# Patient Record
Sex: FEMALE | Race: WHITE | NOT HISPANIC OR LATINO | Employment: UNEMPLOYED | URBAN - NONMETROPOLITAN AREA
[De-identification: names, ages, dates, MRNs, and addresses within clinical notes are randomized per-mention and may not be internally consistent; named-entity substitution may affect disease eponyms.]

---

## 2017-03-01 ENCOUNTER — COMMUNICATION - GICH (OUTPATIENT)
Dept: FAMILY MEDICINE | Facility: OTHER | Age: 72
End: 2017-03-01

## 2017-03-01 DIAGNOSIS — J44.1 CHRONIC OBSTRUCTIVE PULMONARY DISEASE WITH ACUTE EXACERBATION (H): ICD-10-CM

## 2017-03-28 ENCOUNTER — COMMUNICATION - GICH (OUTPATIENT)
Dept: FAMILY MEDICINE | Facility: OTHER | Age: 72
End: 2017-03-28

## 2017-03-28 DIAGNOSIS — J44.1 CHRONIC OBSTRUCTIVE PULMONARY DISEASE WITH ACUTE EXACERBATION (H): ICD-10-CM

## 2017-04-04 ENCOUNTER — COMMUNICATION - GICH (OUTPATIENT)
Dept: FAMILY MEDICINE | Facility: OTHER | Age: 72
End: 2017-04-04

## 2017-04-04 DIAGNOSIS — J43.8 OTHER EMPHYSEMA (H): ICD-10-CM

## 2017-04-24 ENCOUNTER — COMMUNICATION - GICH (OUTPATIENT)
Dept: FAMILY MEDICINE | Facility: OTHER | Age: 72
End: 2017-04-24

## 2017-04-24 DIAGNOSIS — J44.1 CHRONIC OBSTRUCTIVE PULMONARY DISEASE WITH ACUTE EXACERBATION (H): ICD-10-CM

## 2017-05-09 ENCOUNTER — AMBULATORY - GICH (OUTPATIENT)
Dept: SCHEDULING | Facility: OTHER | Age: 72
End: 2017-05-09

## 2017-05-09 ENCOUNTER — HISTORY (OUTPATIENT)
Dept: EMERGENCY MEDICINE | Facility: OTHER | Age: 72
End: 2017-05-09

## 2017-05-12 ENCOUNTER — AMBULATORY - GICH (OUTPATIENT)
Dept: SCHEDULING | Facility: OTHER | Age: 72
End: 2017-05-12

## 2017-05-15 ENCOUNTER — AMBULATORY - GICH (OUTPATIENT)
Dept: SCHEDULING | Facility: OTHER | Age: 72
End: 2017-05-15

## 2018-01-03 NOTE — TELEPHONE ENCOUNTER
Patient Information     Patient Name MRN Sex Jaida Contreras [id number] Female 1945      Telephone Encounter by Macarena Phillips RN at 3/1/2017 10:47 AM     Author:  Macarena Phillips RN Service:  (none) Author Type:  NURS- Registered Nurse     Filed:  3/1/2017 10:48 AM Encounter Date:  3/1/2017 Status:  Signed     :  Macarena Phillips RN (NURS- Registered Nurse)            Asthma/COPD    Office visit in the past 12 months.    Last visit with BISMARK CEDENO was on: 2016 in [facility]  Next visit with BISMARK CEDENO is on: No future appointment listed with this provider  Next visit with Family Practice is on: No future appointment listed in this department    Max refills 12 months from last office visit.    Patient is due for medication management appointment. Limited refill provided at this time and letter recently sent for reminder to patient. Prescription refilled per RN Medication Refill Policy.................... Macarena Phillips RN ....................  3/1/2017   10:48 AM

## 2018-01-03 NOTE — TELEPHONE ENCOUNTER
Patient Information     Patient Name MRN Sex Jaida Contreras [id number] Female 1945      Telephone Encounter by So Huang RN at 3/1/2017 10:21 AM     Author:  So Huang RN Service:  (none) Author Type:  (none)     Filed:  3/1/2017 10:22 AM Encounter Date:  3/1/2017 Status:  Signed     :  So Huang RN (NURS- Registered Nurse)            Asthma/COPD    Office visit in the past 12 months.    Last visit with BISMARK CEDENO was on: 2016 in [facility]  Next visit with BISMARK CEDENO is on: No future appointment listed with this provider  Next visit with Family Practice is on: No future appointment listed in this department    Max refills 12 months from last office visit.    Patient is due for medication management appointment. Limited refill provided at this time and letter sent for reminder to patient. Prescription refilled per RN Medication Refill Policy.................... So Huang ....................  3/1/2017   10:22 AM

## 2018-01-04 NOTE — TELEPHONE ENCOUNTER
Patient Information     Patient Name MRN Sex Jaida Contreras [id number] Female 1945      Telephone Encounter by So Huang RN at 3/28/2017  3:37 PM     Author:  So Huang RN Service:  (none) Author Type:  (none)     Filed:  3/28/2017  3:44 PM Encounter Date:  3/28/2017 Status:  Signed     :  So Huang RN (NURS- Registered Nurse)            Asthma/COPD    Office visit in the past 12 months.    Last visit with BISMARK CEDENO was on: 2016 in [facility]  Next visit with BISMARK CEDENO is on: No future appointment listed with this provider  Next visit with Family Practice is on: No future appointment listed in this department    Max refills 12 months from last office visit.    Patient is due for medication management appointment. Limited refill provided at this time and letter sent for reminder to patient. Prescription refilled per RN Medication Refill Policy.................... So Huang ....................  3/28/2017   3:42 PM

## 2018-01-04 NOTE — TELEPHONE ENCOUNTER
Patient Information     Patient Name MRN Jaida Raymond [id number] Female 1945      Telephone Encounter by So Huang RN at 2017  3:17 PM     Author:  So Huang RN Service:  (none) Author Type:  (none)     Filed:  2017  3:21 PM Encounter Date:  2017 Status:  Signed     :  So Huang RN (NURS- Registered Nurse)            Patient is looking for a refill of her symbicort. Pharmacy reports that patient was prescribed this medication while at [facility] by Dr Scott on 2016. This medication is not currently on patient's medication list.    Oral Steroid Inhalers    Office visit in the past 12 months.    Last visit with ROM ESCOTO was on: 2016 in [facility]  Next visit with ROM ESCOTO is on: No future appointment listed with this provider  Next visit with Family Practice is on: No future appointment listed in this department    Max refills 12 months from last office visit.    Patient is due to be seen by Rom Escoto MD and has been sent letters without a response. Attempted to reach patient by phone without a response.    Unable to complete prescription refill per RN Medication Refill Policy.................... So Huang ....................  2017   3:18 PM

## 2018-01-04 NOTE — TELEPHONE ENCOUNTER
Patient Information     Patient Name MRN Sex Jaida Contreras [id number] Female 1945      Telephone Encounter by So Huang RN at 2017  3:06 PM     Author:  So Huang RN Service:  (none) Author Type:  (none)     Filed:  2017  3:10 PM Encounter Date:  2017 Status:  Signed     :  So Huang RN (NURS- Registered Nurse)            Asthma/COPD    Office visit in the past 12 months.    Last visit with BISMARK CEDENO was on: 2016 in [facility]  Next visit with BISMARK CEDENO is on: No future appointment listed with this provider  Next visit with Family Practice is on: No future appointment listed in this department    Max refills 12 months from last office visit.    Patient has been sent 3 letters to be seen. Attempted to call patient, patient not home. Will call back later. No refills until seen.  So Huang RN............. 2017 3:08 PM

## 2018-01-04 NOTE — TELEPHONE ENCOUNTER
Patient Information     Patient Name MRN Jaida Raymond [id number] Female 1945      Telephone Encounter by So Huang RN at 2017  1:45 PM     Author:  So Huang RN Service:  (none) Author Type:  (none)     Filed:  2017  1:46 PM Encounter Date:  2017 Status:  Signed     :  So Huang RN (NURS- Registered Nurse)            Spoke with patient, states she no longer sees Dr Escoto and is a patient now of Dr Calderon. Pharmacy notified and chart updated.  So Huang RN............. 2017 1:45 PM

## 2018-01-18 RX ORDER — METOPROLOL SUCCINATE 25 MG/1
25 TABLET, EXTENDED RELEASE ORAL DAILY
COMMUNITY
Start: 2013-08-02

## 2018-01-18 RX ORDER — ATORVASTATIN CALCIUM 20 MG/1
20 TABLET, FILM COATED ORAL AT BEDTIME
COMMUNITY
Start: 2017-02-08

## 2018-01-18 RX ORDER — HYDROXYZINE HYDROCHLORIDE 25 MG/1
25 TABLET, FILM COATED ORAL EVERY 6 HOURS PRN
Status: ON HOLD | COMMUNITY
Start: 2016-01-11 | End: 2018-03-09

## 2018-01-18 RX ORDER — NABUMETONE 500 MG/1
500 TABLET, FILM COATED ORAL 2 TIMES DAILY
Status: ON HOLD | COMMUNITY
Start: 2016-01-08 | End: 2018-03-09

## 2018-01-18 RX ORDER — BUDESONIDE AND FORMOTEROL FUMARATE DIHYDRATE 160; 4.5 UG/1; UG/1
2 AEROSOL RESPIRATORY (INHALATION) DAILY
Status: ON HOLD | COMMUNITY
Start: 2017-04-04 | End: 2021-10-27

## 2018-01-18 RX ORDER — VENLAFAXINE HYDROCHLORIDE 150 MG/1
150 CAPSULE, EXTENDED RELEASE ORAL DAILY
COMMUNITY

## 2018-01-18 RX ORDER — ASPIRIN 81 MG/1
81 TABLET ORAL DAILY
COMMUNITY
Start: 2015-09-17

## 2018-01-18 RX ORDER — CLONAZEPAM 1 MG/1
1 TABLET ORAL 2 TIMES DAILY PRN
Status: ON HOLD | COMMUNITY
Start: 2017-04-10 | End: 2021-12-05

## 2018-01-18 RX ORDER — SIMVASTATIN 20 MG
10 TABLET ORAL AT BEDTIME
Status: ON HOLD | COMMUNITY
End: 2018-03-09

## 2018-01-18 RX ORDER — LISINOPRIL 5 MG/1
TABLET ORAL
Status: ON HOLD | COMMUNITY
End: 2021-10-27

## 2018-01-18 RX ORDER — ALBUTEROL SULFATE 0.83 MG/ML
1 SOLUTION RESPIRATORY (INHALATION) EVERY 4 HOURS PRN
COMMUNITY
Start: 2015-09-30

## 2018-01-18 RX ORDER — VENLAFAXINE HYDROCHLORIDE 75 MG/1
75 CAPSULE, EXTENDED RELEASE ORAL DAILY
COMMUNITY

## 2018-01-18 RX ORDER — ALBUTEROL SULFATE 90 UG/1
1-2 AEROSOL, METERED RESPIRATORY (INHALATION) EVERY 4 HOURS PRN
COMMUNITY
Start: 2017-02-23

## 2018-03-08 ENCOUNTER — APPOINTMENT (OUTPATIENT)
Dept: GENERAL RADIOLOGY | Facility: OTHER | Age: 73
DRG: 194 | End: 2018-03-08
Attending: INTERNAL MEDICINE
Payer: COMMERCIAL

## 2018-03-08 ENCOUNTER — HOSPITAL ENCOUNTER (INPATIENT)
Facility: OTHER | Age: 73
LOS: 4 days | Discharge: HOME | DRG: 194 | End: 2018-03-12
Attending: FAMILY MEDICINE | Admitting: INTERNAL MEDICINE
Payer: COMMERCIAL

## 2018-03-08 ENCOUNTER — APPOINTMENT (OUTPATIENT)
Dept: CT IMAGING | Facility: OTHER | Age: 73
DRG: 194 | End: 2018-03-08
Attending: FAMILY MEDICINE
Payer: COMMERCIAL

## 2018-03-08 DIAGNOSIS — N39.0 E. COLI UTI (URINARY TRACT INFECTION): Primary | ICD-10-CM

## 2018-03-08 DIAGNOSIS — F17.210 CIGARETTE SMOKER: ICD-10-CM

## 2018-03-08 DIAGNOSIS — J18.9 PNEUMONIA OF RIGHT LUNG DUE TO INFECTIOUS ORGANISM, UNSPECIFIED PART OF LUNG: ICD-10-CM

## 2018-03-08 DIAGNOSIS — Z79.82 ENCOUNTER FOR LONG-TERM (CURRENT) USE OF ASPIRIN: ICD-10-CM

## 2018-03-08 DIAGNOSIS — B96.20 E. COLI UTI (URINARY TRACT INFECTION): Primary | ICD-10-CM
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
## 2018-03-08 DIAGNOSIS — S32.16XA: ICD-10-CM
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
## 2018-03-08 DIAGNOSIS — S32.130A: ICD-10-CM
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
## 2018-03-08 DIAGNOSIS — I10 HYPERTENSION, UNSPECIFIED TYPE: ICD-10-CM

## 2018-03-08 DIAGNOSIS — N30.90 CYSTITIS: ICD-10-CM

## 2018-03-08 PROBLEM — S32.10XA CLOSED FRACTURE OF SACRUM (H): Status: ACTIVE | Noted: 2018-03-08

## 2018-03-08 LAB
ALBUMIN SERPL-MCNC: 3.6 G/DL (ref 3.5–5.7)
ALBUMIN UR-MCNC: >299 MG/DL
ALP SERPL-CCNC: 86 U/L (ref 34–104)
ALT SERPL W P-5'-P-CCNC: 10 U/L (ref 7–52)
ANION GAP SERPL CALCULATED.3IONS-SCNC: 8 MMOL/L (ref 3–14)
APPEARANCE UR: CLEAR
AST SERPL W P-5'-P-CCNC: 12 U/L (ref 13–39)
BACTERIA #/AREA URNS HPF: ABNORMAL /HPF
BASOPHILS # BLD AUTO: 0.1 10E9/L (ref 0–0.2)
BASOPHILS NFR BLD AUTO: 0.6 %
BILIRUB SERPL-MCNC: 0.5 MG/DL (ref 0.3–1)
BILIRUB UR QL STRIP: ABNORMAL
BUN SERPL-MCNC: 18 MG/DL (ref 7–25)
CALCIUM SERPL-MCNC: 9.7 MG/DL (ref 8.6–10.3)
CHLORIDE SERPL-SCNC: 100 MMOL/L (ref 98–107)
CO2 SERPL-SCNC: 30 MMOL/L (ref 21–31)
COLOR UR AUTO: ABNORMAL
CREAT SERPL-MCNC: 0.74 MG/DL (ref 0.6–1.2)
DIFFERENTIAL METHOD BLD: ABNORMAL
EOSINOPHIL # BLD AUTO: 0 10E9/L (ref 0–0.7)
EOSINOPHIL NFR BLD AUTO: 0.2 %
ERYTHROCYTE [DISTWIDTH] IN BLOOD BY AUTOMATED COUNT: 13.2 % (ref 10–15)
FLUAV+FLUBV RNA SPEC QL NAA+PROBE: NEGATIVE
FLUAV+FLUBV RNA SPEC QL NAA+PROBE: NEGATIVE
GFR SERPL CREATININE-BSD FRML MDRD: 78 ML/MIN/1.7M2
GLUCOSE SERPL-MCNC: 129 MG/DL (ref 70–105)
GLUCOSE UR STRIP-MCNC: NEGATIVE MG/DL
HCT VFR BLD AUTO: 35.2 % (ref 35–47)
HGB BLD-MCNC: 11.4 G/DL (ref 11.7–15.7)
HGB UR QL STRIP: ABNORMAL
IMM GRANULOCYTES # BLD: 0 10E9/L (ref 0–0.4)
IMM GRANULOCYTES NFR BLD: 0.3 %
KETONES UR STRIP-MCNC: NEGATIVE MG/DL
LEUKOCYTE ESTERASE UR QL STRIP: ABNORMAL
LIPASE SERPL-CCNC: 8 U/L (ref 11–82)
LYMPHOCYTES # BLD AUTO: 1.5 10E9/L (ref 0.8–5.3)
LYMPHOCYTES NFR BLD AUTO: 12.8 %
MCH RBC QN AUTO: 29 PG (ref 26.5–33)
MCHC RBC AUTO-ENTMCNC: 32.4 G/DL (ref 31.5–36.5)
MCV RBC AUTO: 90 FL (ref 78–100)
MONOCYTES # BLD AUTO: 1 10E9/L (ref 0–1.3)
MONOCYTES NFR BLD AUTO: 8.5 %
NEUTROPHILS # BLD AUTO: 9 10E9/L (ref 1.6–8.3)
NEUTROPHILS NFR BLD AUTO: 77.6 %
NITRATE UR QL: POSITIVE
NON-SQ EPI CELLS #/AREA URNS LPF: ABNORMAL /LPF
PH UR STRIP: 6.5 PH (ref 5–7)
PLATELET # BLD AUTO: 341 10E9/L (ref 150–450)
POTASSIUM SERPL-SCNC: 4.2 MMOL/L (ref 3.5–5.1)
PROT SERPL-MCNC: 7.2 G/DL (ref 6.4–8.9)
RBC # BLD AUTO: 3.93 10E12/L (ref 3.8–5.2)
RBC #/AREA URNS AUTO: ABNORMAL /HPF
RSV RNA SPEC NAA+PROBE: NEGATIVE
SODIUM SERPL-SCNC: 138 MMOL/L (ref 134–144)
SOURCE: ABNORMAL
SP GR UR STRIP: 1.02 (ref 1–1.03)
SPECIMEN SOURCE: NORMAL
UROBILINOGEN UR STRIP-ACNC: 1 EU/DL (ref 0.2–1)
WBC # BLD AUTO: 11.7 10E9/L (ref 4–11)
WBC #/AREA URNS AUTO: ABNORMAL /HPF

## 2018-03-08 PROCEDURE — 71046 X-RAY EXAM CHEST 2 VIEWS: CPT

## 2018-03-08 PROCEDURE — 25000125 ZZHC RX 250: Performed by: FAMILY MEDICINE

## 2018-03-08 PROCEDURE — 99223 1ST HOSP IP/OBS HIGH 75: CPT | Mod: AI | Performed by: INTERNAL MEDICINE

## 2018-03-08 PROCEDURE — 85025 COMPLETE CBC W/AUTO DIFF WBC: CPT | Performed by: FAMILY MEDICINE

## 2018-03-08 PROCEDURE — 87631 RESP VIRUS 3-5 TARGETS: CPT | Performed by: FAMILY MEDICINE

## 2018-03-08 PROCEDURE — 25000132 ZZH RX MED GY IP 250 OP 250 PS 637: Performed by: INTERNAL MEDICINE

## 2018-03-08 PROCEDURE — 36415 COLL VENOUS BLD VENIPUNCTURE: CPT | Performed by: FAMILY MEDICINE

## 2018-03-08 PROCEDURE — 96365 THER/PROPH/DIAG IV INF INIT: CPT | Performed by: FAMILY MEDICINE

## 2018-03-08 PROCEDURE — 12000000 ZZH R&B MED SURG/OB

## 2018-03-08 PROCEDURE — 87205 SMEAR GRAM STAIN: CPT | Performed by: INTERNAL MEDICINE

## 2018-03-08 PROCEDURE — 25000128 H RX IP 250 OP 636: Performed by: FAMILY MEDICINE

## 2018-03-08 PROCEDURE — 74177 CT ABD & PELVIS W/CONTRAST: CPT

## 2018-03-08 PROCEDURE — 99285 EMERGENCY DEPT VISIT HI MDM: CPT | Mod: 25 | Performed by: FAMILY MEDICINE

## 2018-03-08 PROCEDURE — 94640 AIRWAY INHALATION TREATMENT: CPT

## 2018-03-08 PROCEDURE — 96375 TX/PRO/DX INJ NEW DRUG ADDON: CPT | Performed by: FAMILY MEDICINE

## 2018-03-08 PROCEDURE — 40000275 ZZH STATISTIC RCP TIME EA 10 MIN

## 2018-03-08 PROCEDURE — 80053 COMPREHEN METABOLIC PANEL: CPT | Performed by: FAMILY MEDICINE

## 2018-03-08 PROCEDURE — 81001 URINALYSIS AUTO W/SCOPE: CPT | Performed by: FAMILY MEDICINE

## 2018-03-08 PROCEDURE — 87088 URINE BACTERIA CULTURE: CPT | Performed by: INTERNAL MEDICINE

## 2018-03-08 PROCEDURE — 87070 CULTURE OTHR SPECIMN AEROBIC: CPT | Performed by: INTERNAL MEDICINE

## 2018-03-08 PROCEDURE — 96376 TX/PRO/DX INJ SAME DRUG ADON: CPT | Performed by: FAMILY MEDICINE

## 2018-03-08 PROCEDURE — 25000128 H RX IP 250 OP 636: Performed by: INTERNAL MEDICINE

## 2018-03-08 PROCEDURE — 87086 URINE CULTURE/COLONY COUNT: CPT | Performed by: INTERNAL MEDICINE

## 2018-03-08 PROCEDURE — 83690 ASSAY OF LIPASE: CPT | Performed by: FAMILY MEDICINE

## 2018-03-08 PROCEDURE — 99285 EMERGENCY DEPT VISIT HI MDM: CPT | Mod: Z6 | Performed by: FAMILY MEDICINE

## 2018-03-08 RX ORDER — ALBUTEROL SULFATE 0.83 MG/ML
2.5 SOLUTION RESPIRATORY (INHALATION)
Status: DISCONTINUED | OUTPATIENT
Start: 2018-03-08 | End: 2018-03-08 | Stop reason: ALTCHOICE

## 2018-03-08 RX ORDER — ASPIRIN 81 MG/1
81 TABLET ORAL DAILY
Status: DISCONTINUED | OUTPATIENT
Start: 2018-03-09 | End: 2018-03-12 | Stop reason: HOSPADM

## 2018-03-08 RX ORDER — POTASSIUM CHLORIDE 1500 MG/1
20-40 TABLET, EXTENDED RELEASE ORAL
Status: DISCONTINUED | OUTPATIENT
Start: 2018-03-08 | End: 2018-03-10

## 2018-03-08 RX ORDER — AMOXICILLIN 250 MG
1 CAPSULE ORAL 2 TIMES DAILY PRN
Status: DISCONTINUED | OUTPATIENT
Start: 2018-03-08 | End: 2018-03-11

## 2018-03-08 RX ORDER — AMOXICILLIN 250 MG
2 CAPSULE ORAL 2 TIMES DAILY PRN
Status: DISCONTINUED | OUTPATIENT
Start: 2018-03-08 | End: 2018-03-11

## 2018-03-08 RX ORDER — OXYCODONE HYDROCHLORIDE 5 MG/1
5 TABLET ORAL EVERY 4 HOURS PRN
Status: DISCONTINUED | OUTPATIENT
Start: 2018-03-08 | End: 2018-03-12 | Stop reason: HOSPADM

## 2018-03-08 RX ORDER — MAGNESIUM SULFATE HEPTAHYDRATE 40 MG/ML
4 INJECTION, SOLUTION INTRAVENOUS EVERY 4 HOURS PRN
Status: DISCONTINUED | OUTPATIENT
Start: 2018-03-08 | End: 2018-03-11

## 2018-03-08 RX ORDER — METOPROLOL SUCCINATE 25 MG/1
25 TABLET, EXTENDED RELEASE ORAL DAILY
Status: DISCONTINUED | OUTPATIENT
Start: 2018-03-09 | End: 2018-03-12 | Stop reason: HOSPADM

## 2018-03-08 RX ORDER — BISACODYL 10 MG
10 SUPPOSITORY, RECTAL RECTAL DAILY PRN
Status: DISCONTINUED | OUTPATIENT
Start: 2018-03-08 | End: 2018-03-09

## 2018-03-08 RX ORDER — FENTANYL CITRATE 50 UG/ML
25 INJECTION, SOLUTION INTRAMUSCULAR; INTRAVENOUS
Status: DISCONTINUED | OUTPATIENT
Start: 2018-03-08 | End: 2018-03-08 | Stop reason: ALTCHOICE

## 2018-03-08 RX ORDER — VENLAFAXINE HYDROCHLORIDE 75 MG/1
150 CAPSULE, EXTENDED RELEASE ORAL
Status: DISCONTINUED | OUTPATIENT
Start: 2018-03-09 | End: 2018-03-12 | Stop reason: HOSPADM

## 2018-03-08 RX ORDER — PROCHLORPERAZINE 25 MG
12.5 SUPPOSITORY, RECTAL RECTAL EVERY 12 HOURS PRN
Status: DISCONTINUED | OUTPATIENT
Start: 2018-03-08 | End: 2018-03-09

## 2018-03-08 RX ORDER — PROCHLORPERAZINE MALEATE 5 MG
5 TABLET ORAL EVERY 6 HOURS PRN
Status: DISCONTINUED | OUTPATIENT
Start: 2018-03-08 | End: 2018-03-09

## 2018-03-08 RX ORDER — CEFTRIAXONE SODIUM 1 G/50ML
1 INJECTION, SOLUTION INTRAVENOUS ONCE
Status: COMPLETED | OUTPATIENT
Start: 2018-03-08 | End: 2018-03-08

## 2018-03-08 RX ORDER — NALOXONE HYDROCHLORIDE 0.4 MG/ML
.1-.4 INJECTION, SOLUTION INTRAMUSCULAR; INTRAVENOUS; SUBCUTANEOUS
Status: DISCONTINUED | OUTPATIENT
Start: 2018-03-08 | End: 2018-03-12 | Stop reason: HOSPADM

## 2018-03-08 RX ORDER — ACETAMINOPHEN 650 MG/1
650 SUPPOSITORY RECTAL EVERY 4 HOURS PRN
Status: DISCONTINUED | OUTPATIENT
Start: 2018-03-08 | End: 2018-03-09

## 2018-03-08 RX ORDER — SODIUM CHLORIDE 9 MG/ML
INJECTION, SOLUTION INTRAVENOUS CONTINUOUS
Status: DISCONTINUED | OUTPATIENT
Start: 2018-03-08 | End: 2018-03-09 | Stop reason: CLARIF

## 2018-03-08 RX ORDER — ONDANSETRON 2 MG/ML
4 INJECTION INTRAMUSCULAR; INTRAVENOUS EVERY 6 HOURS PRN
Status: DISCONTINUED | OUTPATIENT
Start: 2018-03-08 | End: 2018-03-09

## 2018-03-08 RX ORDER — PANTOPRAZOLE SODIUM 40 MG/1
40 TABLET, DELAYED RELEASE ORAL
Status: DISCONTINUED | OUTPATIENT
Start: 2018-03-09 | End: 2018-03-12 | Stop reason: HOSPADM

## 2018-03-08 RX ORDER — LEVOFLOXACIN 750 MG/1
750 TABLET, FILM COATED ORAL DAILY
Status: DISCONTINUED | OUTPATIENT
Start: 2018-03-08 | End: 2018-03-12 | Stop reason: HOSPADM

## 2018-03-08 RX ORDER — ALUMINA, MAGNESIA, AND SIMETHICONE 2400; 2400; 240 MG/30ML; MG/30ML; MG/30ML
30 SUSPENSION ORAL EVERY 4 HOURS PRN
Status: DISCONTINUED | OUTPATIENT
Start: 2018-03-08 | End: 2018-03-12 | Stop reason: HOSPADM

## 2018-03-08 RX ORDER — ACETAMINOPHEN 325 MG/1
650 TABLET ORAL EVERY 4 HOURS PRN
Status: DISCONTINUED | OUTPATIENT
Start: 2018-03-08 | End: 2018-03-09

## 2018-03-08 RX ORDER — KETOROLAC TROMETHAMINE 15 MG/ML
15 INJECTION, SOLUTION INTRAMUSCULAR; INTRAVENOUS EVERY 6 HOURS PRN
Status: DISCONTINUED | OUTPATIENT
Start: 2018-03-08 | End: 2018-03-12 | Stop reason: HOSPADM

## 2018-03-08 RX ORDER — NALOXONE HYDROCHLORIDE 0.4 MG/ML
.1-.4 INJECTION, SOLUTION INTRAMUSCULAR; INTRAVENOUS; SUBCUTANEOUS
Status: DISCONTINUED | OUTPATIENT
Start: 2018-03-08 | End: 2018-03-08

## 2018-03-08 RX ORDER — LIDOCAINE 40 MG/G
CREAM TOPICAL
Status: DISCONTINUED | OUTPATIENT
Start: 2018-03-08 | End: 2018-03-12 | Stop reason: HOSPADM

## 2018-03-08 RX ORDER — POTASSIUM CHLORIDE 7.45 MG/ML
10 INJECTION INTRAVENOUS
Status: DISCONTINUED | OUTPATIENT
Start: 2018-03-08 | End: 2018-03-10

## 2018-03-08 RX ORDER — ONDANSETRON 4 MG/1
4 TABLET, ORALLY DISINTEGRATING ORAL EVERY 6 HOURS PRN
Status: DISCONTINUED | OUTPATIENT
Start: 2018-03-08 | End: 2018-03-09

## 2018-03-08 RX ORDER — LISINOPRIL 2.5 MG/1
2.5 TABLET ORAL AT BEDTIME
Status: DISCONTINUED | OUTPATIENT
Start: 2018-03-08 | End: 2018-03-12 | Stop reason: HOSPADM

## 2018-03-08 RX ORDER — ATORVASTATIN CALCIUM 20 MG/1
20 TABLET, FILM COATED ORAL DAILY
Status: DISCONTINUED | OUTPATIENT
Start: 2018-03-09 | End: 2018-03-12 | Stop reason: HOSPADM

## 2018-03-08 RX ADMIN — IOHEXOL 100 ML: 350 INJECTION, SOLUTION INTRAVENOUS at 11:56

## 2018-03-08 RX ADMIN — FLUTICASONE PROPIONATE AND SALMETEROL 1 PUFF: 50; 250 POWDER RESPIRATORY (INHALATION) at 19:09

## 2018-03-08 RX ADMIN — FENTANYL CITRATE 25 MCG: 50 INJECTION, SOLUTION INTRAMUSCULAR; INTRAVENOUS at 11:20

## 2018-03-08 RX ADMIN — SODIUM CHLORIDE: 900 INJECTION, SOLUTION INTRAVENOUS at 16:51

## 2018-03-08 RX ADMIN — ACETAMINOPHEN 650 MG: 325 TABLET, FILM COATED ORAL at 22:09

## 2018-03-08 RX ADMIN — ACETAMINOPHEN 650 MG: 325 TABLET, FILM COATED ORAL at 18:09

## 2018-03-08 RX ADMIN — OXYCODONE HYDROCHLORIDE 5 MG: 5 TABLET ORAL at 19:47

## 2018-03-08 RX ADMIN — FENTANYL CITRATE 25 MCG: 50 INJECTION, SOLUTION INTRAMUSCULAR; INTRAVENOUS at 12:13

## 2018-03-08 RX ADMIN — LEVOFLOXACIN 750 MG: 750 TABLET, FILM COATED ORAL at 15:05

## 2018-03-08 RX ADMIN — ALBUTEROL SULFATE 2.5 MG: 2.5 SOLUTION RESPIRATORY (INHALATION) at 13:47

## 2018-03-08 RX ADMIN — CEFTRIAXONE SODIUM 1 G: 1 INJECTION, SOLUTION INTRAVENOUS at 13:25

## 2018-03-08 RX ADMIN — OXYCODONE HYDROCHLORIDE 5 MG: 5 TABLET ORAL at 15:05

## 2018-03-08 RX ADMIN — KETOROLAC TROMETHAMINE 15 MG: 15 INJECTION, SOLUTION INTRAMUSCULAR; INTRAVENOUS at 22:09

## 2018-03-08 ASSESSMENT — ACTIVITIES OF DAILY LIVING (ADL)
NUMBER_OF_TIMES_PATIENT_HAS_FALLEN_WITHIN_LAST_SIX_MONTHS: 1
CHANGE_IN_FUNCTIONAL_STATUS_SINCE_ONSET_OF_CURRENT_ILLNESS/INJURY: YES
FALL_HISTORY_WITHIN_LAST_SIX_MONTHS: YES
WHICH_OF_THE_ABOVE_FUNCTIONAL_RISKS_HAD_A_RECENT_ONSET_OR_CHANGE?: AMBULATION;TRANSFERRING;FALL HISTORY
BATHING: 1 - ASSISTIVE EQUIPMENT
RETIRED_COMMUNICATION: 0-->UNDERSTANDS/COMMUNICATES WITHOUT DIFFICULTY
TOILETING: 0-->INDEPENDENT
DRESS: 0-->INDEPENDENT
BATHING: 0-->INDEPENDENT
RETIRED_EATING: 0-->INDEPENDENT
COMMUNICATION: 0 - UNDERSTANDS/COMMUNICATES WITHOUT DIFFICULTY
SWALLOWING: 0-->SWALLOWS FOODS/LIQUIDS WITHOUT DIFFICULTY
SWALLOWING: 0 - SWALLOWS FOODS/LIQUIDS WITHOUT DIFFICULTY
TRANSFERRING: 0-->INDEPENDENT
AMBULATION: 0-->INDEPENDENT
COGNITION: 0 - NO COGNITION ISSUES REPORTED
EATING: 0 - INDEPENDENT
DRESS: 0 - INDEPENDENT

## 2018-03-08 ASSESSMENT — ENCOUNTER SYMPTOMS
DYSURIA: 1
ABDOMINAL DISTENTION: 0
FREQUENCY: 1
POLYPHAGIA: 0
AGITATION: 0
NERVOUS/ANXIOUS: 0
POLYDIPSIA: 0
PALPITATIONS: 0
LIGHT-HEADEDNESS: 1
PHOTOPHOBIA: 0
SHORTNESS OF BREATH: 1
FEVER: 0
CONFUSION: 0
ABDOMINAL PAIN: 0
ADENOPATHY: 0
BACK PAIN: 0
CHILLS: 1
COUGH: 1

## 2018-03-08 NOTE — PROGRESS NOTES
1.  Has the patient had a previous reaction to IV contrast? no    2.  Does the patient have kidney disease? no    3.  Is the patient on dialysis? no    If YES to any of these questions, exam will be reviewed with a Radiologist before administering contrast.

## 2018-03-08 NOTE — ED NOTES
"Pt here with PCA with c/o lower abdominal pain that started this AM, pt is nauseated, pt has a decreased intake, pt states that my \"stomach is all messed up from previous surgeries, pt into [facility] via [facility]c, settled onto cart, VSS  "

## 2018-03-08 NOTE — IP AVS SNAPSHOT
MRN:[id number]                      After Visit Summary   3/8/2018    Jaida Nieto    MRN: [id number]           Thank you!     Thank you for choosing [facility] for your care. Our goal is always to provide you with excellent care. Hearing back from our patients is one way we can continue to improve our services. Please take a few minutes to complete the written survey that you may receive in the mail after you visit with us. Thank you!        Patient Information     Date Of Birth          1945        Designated Caregiver       Most Recent Value    Caregiver    Will someone help with your care after discharge? yes    Name of designated caregiver Salome    Phone number of caregiver unknown    Caregiver address home care agency....doesn't remember which one      About your hospital stay     You were admitted on:  March 8, 2018 You last received care in the:  [facility]    You were discharged on:  March 12, 2018        Reason for your hospital stay       Pneumonia, sacral fracture, urinary tract infection                  Who to Call     For medical emergencies, please call 911.  For non-urgent questions about your medical care, please call your primary care provider or clinic, [phone]          Attending Provider     Provider Specialty    Fantasma Corrales MD Family Practice    Valdemar Perez MD Internal Medicine    Timbo Sandhu MD HOSPITALIST       Primary Care Provider Office Phone # Fax #    Osiel Calderon [phone]      After Care Instructions     Activity       Your activity upon discharge: activity as tolerated            Diet       Follow this diet upon discharge: Orders Placed This Encounter      Snacks/Supplements Adult: Ensure Clear; Between Meals      Combination Diet Regular Diet Adult                    Follow-up Appointments     Follow-up and recommended labs and tests       Follow up with Monalisa Smith on 3/14 at 10 AM.                 "  Pending Results     No orders found from 3/6/2018 to 3/9/2018.            Statement of Approval     Ordered          18 0854  I have reviewed and agree with all the recommendations and orders detailed in this document.  EFFECTIVE NOW     Approved and electronically signed by:  Valdemar Perez MD             Admission Information     Date & Time Provider Department Dept. Phone    3/8/2018 Timbo Sandhu MD [facility] [phone]      Your Vitals Were     Blood Pressure Pulse Temperature Respirations Height Weight    117/76 82 96.5  F (35.8  C) (Tympanic) 20 1.715 m (5' 7.5\") 51 kg (112 lb 7 oz)    Pulse Oximetry BMI (Body Mass Index)                98% 17.35 kg/m2          MyChart Information     [facility] lets you send messages to your doctor, view your test results, renew your prescriptions, schedule appointments and more. To sign up, go to [email or web address] . Click on \"Log in\" on the left side of the screen, which will take you to the Welcome page. Then click on \"Sign up Now\" on the right side of the page.     You will be asked to enter the access code listed below, as well as some personal information. Please follow the directions to create your username and password.     Your access code is: [email or web address]  Expires: 2018 10:34 AM     Your access code will  in 90 days. If you need help or a new code, please call your [place] clinic or [phone].        Care EveryWhere ID     This is your Care EveryWhere ID. This could be used by other organizations to access your [place] medical records  [id number]        Equal Access to Services     LEANDRO HUTCHINSON : Hadjerry Miranda, dhara alegria, kinjal pierre. So [facility] [phone].    ATENCIÓN: Si habla español, tiene a chong disposición servicios gratuitos de asistencia lingüística. Llame al [phone].    We comply with applicable federal civil rights laws " and [place] laws. We do not discriminate on the basis of race, color, national origin, age, disability, sex, sexual orientation, or gender identity.               Review of your medicines      START taking        Dose / Directions    acetaminophen 325 MG tablet   Commonly known as:  TYLENOL   Used for:  Closed nondisplaced zone III fracture of sacrum, initial encounter (H)        Dose:  650 mg   Take 2 tablets (650 mg) by mouth 4 times daily   Quantity:  100 tablet   Refills:  0       levofloxacin 750 MG tablet   Commonly known as:  LEVAQUIN   Indication:  Community Acquired Pneumonia   Used for:  Pneumonia of right lung due to infectious organism, unspecified part of lung        Dose:  750 mg   Take 1 tablet (750 mg) by mouth daily for 3 days   Quantity:  3 tablet   Refills:  0       oxyCODONE IR 5 MG tablet   Commonly known as:  ROXICODONE   Used for:  Closed nondisplaced zone III fracture of sacrum, initial encounter (H)        Dose:  5 mg   Take 1 tablet (5 mg) by mouth every 4 hours as needed for other or moderate to severe pain (pain control or improvement in physical function. Hold dose for analgesic side effects.)   Quantity:  30 tablet   Refills:  0         CONTINUE these medicines which may have CHANGED, or have new prescriptions. If we are uncertain of the size of tablets/capsules you have at home, strength may be listed as something that might have changed.        Dose / Directions    * order for DME   This may have changed:  Another medication with the same name was added. Make sure you understand how and when to take each.        Hospital bed for recent abdominal surgery   Refills:  0       * order for DME   This may have changed:  You were already taking a medication with the same name, and this prescription was added. Make sure you understand how and when to take each.   Used for:  Closed nondisplaced zone III fracture of sacrum, initial encounter (H)        Equipment being ordered: Walker 4 wheels  Treatment Diagnosis: sacral fracture PETE: 6 months   Quantity:  1 Units   Refills:  0       * Notice:  This list has 2 medication(s) that are the same as other medications prescribed for you. Read the directions carefully, and ask your doctor or other care provider to review them with you.      CONTINUE these medicines which have NOT CHANGED        Dose / Directions    * albuterol (2.5 MG/3ML) 0.083% neb solution        Dose:  1 vial   Inhale 1 vial into the lungs every 4 hours as needed   Refills:  0       * VENTOLIN  (90 BASE) MCG/ACT Inhaler   Generic drug:  albuterol        Dose:  1-2 puff   Inhale 1-2 puffs into the lungs every 4 hours as needed   Refills:  0       aspirin EC 81 MG EC tablet        Dose:  81 mg   Take 81 mg by mouth daily   Refills:  0       atorvastatin 20 MG tablet   Commonly known as:  LIPITOR        Dose:  20 mg   Take 20 mg by mouth At Bedtime   Refills:  0       clonazePAM 0.5 MG tablet   Commonly known as:  klonoPIN        Dose:  0.5 mg   Take 0.5 mg by mouth daily . May taken an additional tablet at bedtime as needed   Refills:  0       D 2000 2000 UNITS tablet   Generic drug:  cholecalciferol        Dose:  1 tablet   Take 1 tablet by mouth daily   Refills:  0       lisinopril 5 MG tablet   Commonly known as:  PRINIVIL/ZESTRIL        Take 2.5 mg by mouth daily   Refills:  0       metoprolol succinate 25 MG 24 hr tablet   Commonly known as:  TOPROL-XL        Dose:  25 mg   Take 25 mg by mouth daily   Refills:  0       * Nebulizer Compressor Kit        Nebulizer, neb kit, neb cup and mask.  Medication: Albuterol  For home use. Length of need  for Medicare patients: 99   Refills:  0       * Nebulizer Compressor Kit        Nebulizer machine and mask/tubing   Refills:  0       omeprazole 20 MG CR capsule   Commonly known as:  priLOSEC        Dose:  20 mg   Take 20 mg by mouth daily   Refills:  0       SYMBICORT 160-4.5 MCG/ACT Inhaler   Generic drug:  budesonide-formoterol         Dose:  2 puff   Inhale 2 puffs into the lungs daily   Refills:  0       * venlafaxine 150 MG 24 hr capsule   Commonly known as:  EFFEXOR-XR        Dose:  150 mg   Take 150 mg by mouth daily   Refills:  0       * venlafaxine 75 MG 24 hr capsule   Commonly known as:  EFFEXOR-XR        Dose:  75 mg   Take 75 mg by mouth At Bedtime   Refills:  0       * Notice:  This list has 6 medication(s) that are the same as other medications prescribed for you. Read the directions carefully, and ask your doctor or other care provider to review them with you.         Where to get your medicines      These medications were sent to [id number] - [place], [place] - [address], [facility] [address]     Phone:  [phone]     levofloxacin 750 MG tablet         Some of these will need a paper prescription and others can be bought over the counter. Ask your nurse if you have questions.     Bring a paper prescription for each of these medications     order for DME    oxyCODONE IR 5 MG tablet       You don't need a prescription for these medications     acetaminophen 325 MG tablet                Protect others around you: Learn how to safely use, store and throw away your medicines at www.disposemymeds.org.        ANTIBIOTIC INSTRUCTION     You've Been Prescribed an Antibiotic - Now What?  Your healthcare team thinks that you or your loved one might have an infection. Some infections can be treated with antibiotics, which are powerful, life-saving drugs. Like all medications, antibiotics have side effects and should only be used when necessary. There are some important things you should know about your antibiotic treatment.      Your healthcare team may run tests before you start taking an antibiotic.    Your team may take samples (e.g., from your blood, urine or other areas) to run tests to look for bacteria. These test can be important to determine if you need an antibiotic  at all and, if you do, which antibiotic will work best.      Within a few days, your healthcare team might change or even stop your antibiotic.    Your team may start you on an antibiotic while they are working to find out what is making you sick.    Your team might change your antibiotic because test results show that a different antibiotic would be better to treat your infection.    In some cases, once your team has more information, they learn that you do not need an antibiotic at all. They may find out that you don't have an infection, or that the antibiotic you're taking won't work against your infection. For example, an infection caused by a virus can't be treated with antibiotics. Staying on an antibiotic when you don't need it is more likely to be harmful than helpful.      You may experience side effects from your antibiotic.    Like all medications, antibiotics have side effects. Some of these can be serious.    Let you healthcare team know if you have any known allergies when you are admitted to the hospital.    One significant side effect of nearly all antibiotics is the risk of severe and sometimes deadly diarrhea caused by Clostridium difficile (C. Difficile). This occurs when a person takes antibiotics because some good germs are destroyed. Antibiotic use allows C. diificile to take over, putting patients at high risk for this serious infection.    As a patient or caregiver, it is important to understand your or your loved one's antibiotic treatment. It is especially important for caregivers to speak up when patients can't speak for themselves. Here are some important questions to ask your healthcare team.    What infection is this antibiotic treating and how do you know I have that infection?    What side effects might occur from this antibiotic?    How long will I need to take this antibiotic?    Is it safe to take this antibiotic with other medications or supplements (e.g., vitamins) that I am  taking?     Are there any special directions I need to know about taking this antibiotic? For example, should I take it with food?    How will I be monitored to know whether my infection is responding to the antibiotic?    What tests may help to make sure the right antibiotic is prescribed for me?      Information provided by:  www.cdc.gov/getsmart  U.S. Department of Health and Human Services  Centers for disease Control and Prevention  National Center for Emerging and Zoonotic Infectious Diseases  Division of Healthcare Quality Promotion        Information about OPIOIDS     PRESCRIPTION OPIOIDS: WHAT YOU NEED TO KNOW    Prescription opioids can be used to help relieve moderate to severe pain and are often prescribed following a surgery or injury, or for certain health conditions. These medications can be an important part of treatment but also come with serious risks. It is important to work with your health care provider to make sure you are getting the safest, most effective care.    WHAT ARE THE RISKS AND SIDE EFFECTS OF OPIOID USE?  Prescription opioids carry serious risks of addiction and overdose, especially with prolonged use. An opioid overdose, often marked by slowed breathing can cause sudden death. The use of prescription opioids can have a number of side effects as well, even when taken as directed:      Tolerance - meaning you might need to take more of a medication for the same pain relief    Physical dependence - meaning you have symptoms of withdrawal when a medication is stopped    Increased sensitivity to pain    Constipation    Nausea, vomiting, and dry mouth    Sleepiness and dizziness    Confusion    Depression    Low levels of testosterone that can result in lower sex drive, energy, and strength    Itching and sweating    RISKS ARE GREATER WITH:    History of drug misuse, substance use disorder, or overdose    Mental health conditions (such as depression or anxiety)    Sleep apnea    Older  age (65 years or older)    Pregnancy    Avoid alcohol while taking prescription opioids.   Also, unless specifically advised by your health care provider, medications to avoid include:    Benzodiazepines (such as Xanax or Valium)    Muscle relaxants (such as Soma or Flexeril)    Hypnotics (such as Ambien or Lunesta)    Other prescription opioids    KNOW YOUR OPTIONS:  Talk to your health care provider about ways to manage your pain that do not involve prescription opioids. Some of these options may actually work better and have fewer risks and side effects:    Pain relievers such as acetaminophen, ibuprofen, and naproxen    Some medications that are also used for depression or seizures    Physical therapy and exercise    Cognitive behavioral therapy, a psychological, goal-directed approach, in which patients learn how to modify physical, behavioral, and emotional triggers of pain and stress    IF YOU ARE PRESCRIBED OPIOIDS FOR PAIN:    Never take opioids in greater amounts or more often than prescribed    Follow up with your primary health care provider and work together to create a plan on how to manage your pain.    Talk about ways to help manage your pain that do not involve prescription opioids    Talk about all concerns and side effects    Help prevent misuse and abuse    Never sell or share prescription opioids    Never use another person's prescription opioids    Store prescription opioids in a secure place and out of reach of others (this may include visitors, children, friends, and family)    Visit www.cdc.gov/drugoverdose to learn about risks of opioid abuse and overdose    If you believe you may be struggling with addiction, tell your health care provider and ask for guidance or call [facility]'s National Helpline at 2-068-249-HELP    LEARN MORE / www.cdc.gov/drugoverdose/prescribing/guideline.html    Safely dispose of unused prescription opioids: Find your local drug take-back programs and more information  about the importance of safe disposal at www.doseofreality.[place].gov             Medication List: This is a list of all your medications and when to take them. Check marks below indicate your daily home schedule. Keep this list as a reference.      Medications           Morning Afternoon Evening Bedtime As Needed    acetaminophen 325 MG tablet   Commonly known as:  TYLENOL   Take 2 tablets (650 mg) by mouth 4 times daily   Last time this was given:  650 mg on 3/12/2018  7:52 AM                                * albuterol (2.5 MG/3ML) 0.083% neb solution   Inhale 1 vial into the lungs every 4 hours as needed   Last time this was given:  2.5 mg on 3/12/2018  7:21 AM                                * VENTOLIN  (90 BASE) MCG/ACT Inhaler   Inhale 1-2 puffs into the lungs every 4 hours as needed   Generic drug:  albuterol                                aspirin EC 81 MG EC tablet   Take 81 mg by mouth daily   Last time this was given:  81 mg on 3/12/2018  9:31 AM                                atorvastatin 20 MG tablet   Commonly known as:  LIPITOR   Take 20 mg by mouth At Bedtime   Last time this was given:  20 mg on 3/12/2018  9:32 AM                                clonazePAM 0.5 MG tablet   Commonly known as:  klonoPIN   Take 0.5 mg by mouth daily . May taken an additional tablet at bedtime as needed   Last time this was given:  0.5 mg on 3/11/2018  1:53 PM                                D 2000 2000 UNITS tablet   Take 1 tablet by mouth daily   Generic drug:  cholecalciferol                                levofloxacin 750 MG tablet   Commonly known as:  LEVAQUIN   Take 1 tablet (750 mg) by mouth daily for 3 days   Last time this was given:  750 mg on 3/12/2018  9:32 AM                                lisinopril 5 MG tablet   Commonly known as:  PRINIVIL/ZESTRIL   Take 2.5 mg by mouth daily   Last time this was given:  2.5 mg on 3/10/2018  9:40 PM                                metoprolol succinate 25 MG 24 hr tablet    Commonly known as:  TOPROL-XL   Take 25 mg by mouth daily   Last time this was given:  25 mg on 3/12/2018  9:31 AM                                * Nebulizer Compressor Kit   Nebulizer, neb kit, neb cup and mask.  Medication: Albuterol  For home use. Length of need  for Medicare patients: 99                                * Nebulizer Compressor Kit   Nebulizer machine and mask/tubing                                omeprazole 20 MG CR capsule   Commonly known as:  priLOSEC   Take 20 mg by mouth daily                                * order for DME   Hospital bed for recent abdominal surgery                                * order for DME   Equipment being ordered: Walker 4 wheels Treatment Diagnosis: sacral fracture PETE: 6 months                                oxyCODONE IR 5 MG tablet   Commonly known as:  ROXICODONE   Take 1 tablet (5 mg) by mouth every 4 hours as needed for other or moderate to severe pain (pain control or improvement in physical function. Hold dose for analgesic side effects.)   Last time this was given:  5 mg on 3/12/2018  5:03 AM                                SYMBICORT 160-4.5 MCG/ACT Inhaler   Inhale 2 puffs into the lungs daily   Generic drug:  budesonide-formoterol                                * venlafaxine 150 MG 24 hr capsule   Commonly known as:  EFFEXOR-XR   Take 150 mg by mouth daily   Last time this was given:  150 mg on 3/12/2018  7:51 AM                                * venlafaxine 75 MG 24 hr capsule   Commonly known as:  EFFEXOR-XR   Take 75 mg by mouth At Bedtime   Last time this was given:  150 mg on 3/12/2018  7:51 AM                                * Notice:  This list has 8 medication(s) that are the same as other medications prescribed for you. Read the directions carefully, and ask your doctor or other care provider to review them with you.

## 2018-03-08 NOTE — ED NOTES
Patient in room with PCA. Reporting pain 5/10 in abdomen. Patient requesting another warm blanket. Riggins given.

## 2018-03-08 NOTE — H&P
[facility]    History and Physical  Hospitalist       Date of Admission:  3/8/2018    Assessment & Plan   Jaida Nieto is a 72 year old female who presents with dyspnea, pelvic pain and weakness.       Community acquired pneumonia of right lung    Assessment: present on admission. Leukocytosis, dyspnea, and abnormal imaging on CT.     Plan: Admit. IV levofloxacin. Chest xray. Monitor      Urinary tract infection    Assessment: present on admission, symptomatic    Plan: IV levofloxacin, monitor culture       Closed fracture of sacrum (H)    Assessment: status post fall from standing on a bed.     Plan: physical and occupational therapy, pain control    Active Problems:    COPD (chronic obstructive pulmonary disease) (H)    Assessment: chronic, severe. Not in an acute exacerbation at this time    Plan: continue advair, nebs      Coronary atherosclerosis    Assessment: chronic stable       Depression    Assessment: chronic    Plan: venlafaxine      SUSAN (generalized anxiety disorder)    Assessment: chronic    Plan: venlafaxine      Hypertension    Assessment: chronic    Plan: continue antihypertensives      Squamous cell carcinoma of skin of left upper extremity, including shoulder      # Pain Assessment:   Current Pain Score 3/8/2018 3/8/2018 3/8/2018   Patient currently in pain? yes - -   Pain score (0-10) - 10 10   Pain location Groin - -   Pain descriptors Sharp;Aching - -   - Jaida is experiencing pain due to sacral fracture. Pain management was discussed and the plan was created in a collaborative fashion.  Jaida's response to the current recommendations: engaged  - Opioid regimen: oxycodone  - Response to opioid medications: Reduction of symptoms   - Bowel regimen: not needed        DVT Prophylaxis: Pneumatic Compression Devices  Code Status: Full    Valdemar Perez    Primary Care Physician   Physician No Ref-Primary    Chief Complaint   Pelvic pain, dyspnea, dysuria    History is  obtained from the patient and chart review.    History of Present Illness   Jaida Nieto is a 72 year old female who presents with various symptoms that have worsened over the past week.  She was standing on her bed hanging curtains when she fell off the bed landing on her buttocks.  She had pain afterwards but is been ambulatory despite this pain.  This happened 2 weeks ago, but over the past week she has noticed dysuria and polyuria.  She has no urinary incontinence.  She also has been more short of breath a productive cough.  She denies fevers or chills.    In the emergency department a CT of the abdomen and pelvis shows a sacral fracture.  There was also pneumonia seen in the right lower lung.  Urinalysis shows bacturia and 25-50 white blood cells.  She was admitted with community acquired pneumonia, urinary tract infection, and a new sacral fracture.    Past Medical History    I have reviewed this patient's medical history and updated it with pertinent information if needed.   Past Medical History:   Diagnosis Date     Asthma with acute exacerbation     No Comments Provided     Atherosclerotic heart disease of native coronary artery without angina pectoris     1/8/2007,mild CAD 9-06 Stress CM, initial EF 15%.     Cardiomyopathy in disease classified elsewhere (H)     9/15/2006,Stress CM/[place] initial EF15%. 9-06 F/U EF 55%.     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  Past Surgical History:   Procedure Laterality Date     ARTHROPLASTY HIP      left     ARTHROSCOPY KNEE      left knee surgical repair     CHOLECYSTECTOMY      9/3/13,open     COLONOSCOPY      8/26/13,F/U 2018     ELBOW SURGERY      left elbow repair surgical     LAPAROTOMY EXPLORATORY      9/3/13,lysis of adhesions     OTHER SURGICAL HISTORY      ,HERNIA REPAIR,x 2 with mesh     OTHER SURGICAL HISTORY      6/4/13,,COLOSTOMY,Sigmoid Colostomy/Brian's, removal mesh     OTHER SURGICAL HISTORY      8/27/13,,COLOSTOMY,Colostomy closure, appy 29 mm EEA     OTHER SURGICAL HISTORY      3/10/14,,HERNIA REPAIR, with mesh and bilateral component separation       Prior to Admission Medications   Prior to Admission Medications   Prescriptions Last Dose Informant Patient Reported? Taking?   Respiratory Therapy Supplies (NEBULIZER COMPRESSOR) KIT   Yes No   Sig: Nebulizer machine and mask/tubing   Respiratory Therapy Supplies (NEBULIZER COMPRESSOR) KIT   Yes No   Sig: Nebulizer, neb kit, neb cup and mask.  Medication: Albuterol  For home use. Length of need  for Medicare patients: 99   albuterol (2.5 MG/3ML) 0.083% neb solution   Yes No   albuterol (VENTOLIN HFA) 108 (90 BASE) MCG/ACT Inhaler   Yes No   aspirin EC 81 MG EC tablet   Yes No   atorvastatin (LIPITOR) 20 MG tablet   Yes No   Sig: Take 20 mg by mouth daily   budesonide-formoterol (SYMBICORT) 160-4.5 MCG/ACT Inhaler   Yes No   cholecalciferol (D 2000) 2000 UNITS tablet   Yes No   clonazePAM (KLONOPIN) 0.5 MG tablet   Yes No   fluticasone-salmeterol (ADVAIR DISKUS) 250-50 MCG/DOSE diskus inhaler   Yes No   hydrOXYzine (ATARAX) 25 MG tablet   Yes No   lisinopril (PRINIVIL/ZESTRIL) 5 MG tablet   Yes No   Sig: Take 2.5 mg by mouth at bedtime   metoprolol succinate (TOPROL-XL) 25 MG 24 hr tablet   Yes No   Sig: Take 25 mg  by mouth daily   nabumetone (RELAFEN) 500 MG tablet   Yes No   omeprazole (PRILOSEC) 20 MG CR capsule   Yes No   order for DME   Yes No   Sig: Hospital bed for recent abdominal surgery   simvastatin (ZOCOR) 20 MG tablet   Yes No   venlafaxine (EFFEXOR-XR) 150 MG 24 hr capsule   Yes No   venlafaxine (EFFEXOR-XR) 75 MG 24 hr capsule   Yes No      Facility-Administered Medications: None     Allergies   Allergies   Allergen Reactions     Codeine Nausea and Vomiting     Gabapentin Unknown     Felt like being plugged into light socket after taking 1 pill     Nortriptyline      Other reaction(s): Emotional Disturbance  Made her feel funny     Sucralfate Nausea       Social History   I have reviewed this patient's social history and updated it with pertinent information if needed. Jaida Nieto  reports that she has been smoking Cigarettes.  She has a 42.00 pack-year smoking history. She has never used smokeless tobacco. She reports that she does not drink alcohol.    Family History   I have reviewed this patient's family history and updated it with pertinent information if needed.   Family History   Problem Relation Age of Onset     HEART DISEASE Mother      Heart Disease,Valvular Heart Surgery     Colon Cancer Father      Cancer-colon     Colon Cancer Sister      Cancer-colon       Review of Systems   The 10 point Review of Systems is negative other than noted in the HPI or here.     Physical Exam   Temp: 99.5  F (37.5  C) Temp src: Tympanic BP: 129/65 Pulse: 93   Resp: 24 SpO2: 95 % O2 Device: None (Room air)    Vital Signs with Ranges  Temp:  [98.7  F (37.1  C)-99.5  F (37.5  C)] 99.5  F (37.5  C)  Pulse:  [86-93] 93  Resp:  [16-24] 24  BP: (116-132)/(60-77) 129/65  SpO2:  [77 %-96 %] 95 %  107 lbs 2.3 oz    Constitutional: In no apparent distress  Eyes: pupils reactive, extraocular movements intact. Anicteric sclera.   HEENT: TM's normal, oropharynx nonerythematous. Neck supple, no JVD.  Respiratory: right lung  crackles, expiratory wheezes  Cardiovascular: regular, no murmur. No lower extremity edema.  GI: soft, non-tender, bowel sounds present.  Lymph/Hematologic: no cervical or supraclavicular LAD.  Genitourinary: deferred  Skin: no rashes, or sores  Musculoskeletal: no joint erythema or swelling  Neurologic: cranial nerves symmetric. Neuro exam nonfocal  Psychiatric: alert and oriented x3. Interactive.       Data   Data reviewed today:  I personally reviewed the abdominal CT image(s) showing RLL pneumonia and a sacral fx.    Recent Labs  Lab 03/08/18  1055 03/08/18  1053   WBC 11.7*  --    HGB 11.4*  --    MCV 90  --      --      --    POTASSIUM 4.2  --    CHLORIDE 100  --    CO2 30  --    BUN 18  --    CR 0.74  --    ANIONGAP 8  --    DARRIUS 9.7  --    *  --    ALBUMIN 3.6  --    PROTTOTAL 7.2  --    BILITOTAL 0.5  --    ALKPHOS 86  --    ALT 10  --    AST 12*  --    LIPASE  --  8*       Recent Results (from the past 24 hour(s))   CT Abdomen Pelvis w Contrast    Narrative    PROCEDURE:  CT ABDOMEN PELVIS W CONTRAST    HISTORY: Abd pain,  recent fall, groin pain, vomiting no oral  contrast;     TECHNIQUE:  Helical CT of the abdomen and pelvis was performed  following injection of intravenous contrast.  Ingested oral contrast  partially opacifies the bowel.      COMPARISON:  5/9/2017    MEDS/CONTRAST: omnipaque 350 100 ml    FINDINGS:      Limited images through the lung bases demonstrate new nodular and  tree-in-bud consolidation at the right lung base. No pericardial or  pleural effusions are seen.    Postoperative changes of prior cholecystectomy are again seen.  Pneumobilia is present. There is persistent panbiliary dilatation.  Focal low-density lesions within the liver remain stable, likely  cysts.    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 There is chronic left  hydronephrosis. The spleen is unremarkable. Multifocal bowel surgical  changes are redemonstrated. The bowel caliber is unchanged. The  aorta  demonstrates preserved caliber despite advanced atherosclerotic  calcifications. High-grade iliac narrowing is suggested.    No free fluid, free air or adenopathy is present. The bones are  osteoporotic. There is new subchondral sclerosis within the left  sacral isael, suggesting a subacute to chronic sacral insufficiency  fracture.      Impression    IMPRESSION:      Right lower lobe nodular consolidation suggests acute pneumonia or  other pneumonitis. Recommend immediate follow-up radiographs to set a  baseline for further follow-up.    Age-indeterminate, acute to subacute left sacral ala osteoporotic  pathologic fracture.    SOUMYA EDGAR MD

## 2018-03-08 NOTE — ED NOTES
Patient reporting decrease in pain with administration of 25mcg Fentanyl IV. Patient reporting pain comes back after urination. Voided 100ml orange urine. Patient resting comfortably. MD in room.

## 2018-03-08 NOTE — PROGRESS NOTES
" [facility] ADMISSION NOTE    Patient admitted to room 314 at approximately 1400 via cart from emergency room. Patient was accompanied by other:staff.     Verbal SBAR report received from HALLIE Arenas prior to patient arrival.     Patient trasferred to bed via self. Patient alert and oriented X 3. Pain is not well controlled.  Medication(s) being used: narcotic analgesics including fentenyl. 0-10 Pain Scale: 10. Admission vital signs: Blood pressure 129/65, pulse 93, temperature 99.5  F (37.5  C), temperature source Tympanic, resp. rate 24, height 1.715 m (5' 7.5\"), weight 48.6 kg (107 lb 2.3 oz), SpO2 95 %. Patient was oriented to plan of care, telephone, bathroom and visiting hours.     The following safety risks were identified during admission: fall. Yellow risk band applied: YES.     Gabby Pratt    "

## 2018-03-08 NOTE — IP AVS SNAPSHOT
[facility]    [address]    [place] [address]    Phone:  [phone]    Fax:  [phone]                                       After Visit Summary   3/8/2018    Jaida Nieto    MRN: [id number]           After Visit Summary Signature Page     I have received my discharge instructions, and my questions have been answered. I have discussed any challenges I see with this plan with the nurse or doctor.    ..........................................................................................................................................  Patient/Patient Representative Signature      ..........................................................................................................................................  Patient Representative Print Name and Relationship to Patient    ..................................................               ................................................  Date                                            Time    ..........................................................................................................................................  Reviewed by Signature/Title    ...................................................              ..............................................  Date                                                            Time

## 2018-03-08 NOTE — ED PROVIDER NOTES
"  History     Chief Complaint   Patient presents with     Abdominal Pain   ED Notes:   Pt here with PCA with c/o lower abdominal pain that started this AM, pt is nauseated, pt has a decreased intake, pt states that my \"stomach is all messed up from previous surgeries, pt into [facility] via w.c, settled onto cart, VSS    Reviewed RN notes below, same history relayed to me    HPI  Jaida Nieto is a 72 year old female who since the emergency department with abdominal and groin pain as well as shortness of breath cough congestion frequent urination.  She even had an episode of incontinence of urine today.  She fell a few days ago while standing on a bed adjusting a curtain and has had increased pain since then.  She does have chronic back and hip pain.    Problem List:    Patient Active Problem List    Diagnosis Date Noted     Controlled substance agreement terminated 12/03/2015     Priority: Medium     Overview:   Urine drug screen with multiple non prescribed narcotics present       Abdominal pain, chronic, right upper quadrant 11/30/2015     Priority: Medium     Controlled substance agreement signed 11/30/2015     Priority: Medium     Squamous cell carcinoma of skin of left upper extremity, including shoulder 11/12/2015     Priority: Medium     Acute biliary pancreatitis 10/05/2015     Priority: Medium     Acute cholangitis 08/05/2015     Priority: Medium     COPD (chronic obstructive pulmonary disease) (H) 08/05/2015     Priority: Medium     SBO (small bowel obstruction) 07/05/2014     Priority: Medium     S/P repair of ventral hernia 03/21/2014     Priority: Medium     Postoperative state 03/15/2014     Priority: Medium     S/P repair of recurrent ventral hernia 03/11/2014     Priority: Medium     Recurrent ventral hernia 02/14/2014     Priority: Medium     SUSAN (generalized anxiety disorder) 01/08/2014     Priority: Medium     COPD with acute exacerbation (H) 11/18/2013     Priority: Medium     Vitamin D deficiency " 10/14/2013     Priority: Medium     Hypertension 08/30/2013     Priority: Medium     H/O adenomatous polyp of colon 08/26/2013     Priority: Medium     Depression 06/10/2013     Priority: Medium     Diverticulosis of sigmoid colon 06/07/2013     Priority: Medium     Other specified congenital anomaly of kidney 06/28/2011     Priority: Medium     Coronary atherosclerosis 01/08/2007     Priority: Medium     Overview:   mild CAD 9-06  Stress CM, initial EF 15%.       Cardiomyopathy in diseases classified elsewhere (H) 09/15/2006     Priority: Medium     Overview:   Stress CM/T[place]o  initial EF15%. 9-06  F/U EF 55%.       Tobacco use disorder 09/15/2006     Priority: Medium     Overview:   1PPD       Migraine headache 09/13/2006     Priority: Medium     Pure hypercholesterolemia 09/13/2006     Priority: Medium        Past Medical History:    Past Medical History:   Diagnosis Date     Asthma with acute exacerbation      Atherosclerotic heart disease of native coronary artery without angina pectoris      Cardiomyopathy in disease classified elsewhere (H)      Essential (primary) hypertension      Hyperlipidemia      Major depressive disorder, single episode      Migraine without status migrainosus, not intractable      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      Other specified congenital malformations of kidney (CODE)      Squamous cell carcinoma of skin of left upper limb, including shoulder      Type 2 diabetes mellitus without complications (H)        Past Surgical History:    Past Surgical History:   Procedure Laterality Date     ARTHROPLASTY HIP      left     ARTHROSCOPY KNEE      left knee surgical repair     CHOLECYSTECTOMY      9/3/13,open     COLONOSCOPY      8/26/13,F/U 2018     ELBOW SURGERY      left elbow repair surgical     LAPAROTOMY EXPLORATORY      9/3/13,lysis of adhesions     OTHER SURGICAL HISTORY      ,HERNIA REPAIR,x 2 with mesh     OTHER SURGICAL HISTORY       6/4/13,,COLOSTOMY,Sigmoid Colostomy/Brian's, removal mesh     OTHER SURGICAL HISTORY      8/27/13,,COLOSTOMY,Colostomy closure, appy 29 mm EEA     OTHER SURGICAL HISTORY      3/10/14,,HERNIA REPAIR, with mesh and bilateral component separation       Family History:    Family History   Problem Relation Age of Onset     HEART DISEASE Mother      Heart Disease,Valvular Heart Surgery     Colon Cancer Father      Cancer-colon     Colon Cancer Sister      Cancer-colon       Social History:  Marital Status:   [5]  Social History   Substance Use Topics     Smoking status: Current Every Day Smoker     Packs/day: 1.00     Years: 42.00     Types: Cigarettes     Smokeless tobacco: Never Used     Alcohol use No        Medications:      fluticasone-salmeterol (ADVAIR DISKUS) 250-50 MCG/DOSE diskus inhaler   albuterol (2.5 MG/3ML) 0.083% neb solution   aspirin EC 81 MG EC tablet   atorvastatin (LIPITOR) 20 MG tablet   budesonide-formoterol (SYMBICORT) 160-4.5 MCG/ACT Inhaler   cholecalciferol (D 2000) 2000 UNITS tablet   clonazePAM (KLONOPIN) 0.5 MG tablet   Respiratory Therapy Supplies (NEBULIZER COMPRESSOR) KIT   order for DME   hydrOXYzine (ATARAX) 25 MG tablet   lisinopril (PRINIVIL/ZESTRIL) 5 MG tablet   metoprolol succinate (TOPROL-XL) 25 MG 24 hr tablet   nabumetone (RELAFEN) 500 MG tablet   Respiratory Therapy Supplies (NEBULIZER COMPRESSOR) KIT   omeprazole (PRILOSEC) 20 MG CR capsule   simvastatin (ZOCOR) 20 MG tablet   venlafaxine (EFFEXOR-XR) 150 MG 24 hr capsule   venlafaxine (EFFEXOR-XR) 75 MG 24 hr capsule   albuterol (VENTOLIN HFA) 108 (90 BASE) MCG/ACT Inhaler         Review of Systems   Constitutional: Positive for chills. Negative for fever.   HENT: Positive for congestion.    Eyes: Negative for photophobia.   Respiratory: Positive for cough and shortness of breath.    Cardiovascular: Negative for chest pain, palpitations and leg swelling.   Gastrointestinal: Negative for abdominal  "distention and abdominal pain.   Endocrine: Negative for polydipsia, polyphagia and polyuria.   Genitourinary: Positive for dysuria and frequency.   Musculoskeletal: Negative for back pain and gait problem.   Neurological: Positive for light-headedness.   Hematological: Negative for adenopathy.   Psychiatric/Behavioral: Negative for agitation and confusion. The patient is not nervous/anxious.        Physical Exam   BP: 125/76  Pulse: 86  Temp: 98.7  F (37.1  C)  Resp: 16  Height: 170.2 cm (5' 7\")  Weight: 52.2 kg (115 lb)  SpO2: 94 %      Physical Exam   Constitutional: She is oriented to person, place, and time. She appears well-developed and well-nourished.   HENT:   Mouth/Throat: No oropharyngeal exudate.   Eyes: Conjunctivae and EOM are normal. Pupils are equal, round, and reactive to light.   Neck: Normal range of motion. Neck supple.   Cardiovascular: Normal rate.    No murmur heard.  Pulmonary/Chest: Effort normal. She has rales.   Abdominal: Soft. Bowel sounds are normal.   Musculoskeletal: She exhibits no edema.   Neurological: She is alert and oriented to person, place, and time. She has normal reflexes.   Skin: Skin is warm and dry.   Psychiatric: She has a normal mood and affect.   Nursing note and vitals reviewed.      ED Course     ED Course     Procedures       Results for orders placed or performed during the hospital encounter of 03/08/18 (from the past 24 hour(s))   Lipase   Result Value Ref Range    Lipase 8 (L) 11 - 82 U/L   Comprehensive metabolic panel   Result Value Ref Range    Sodium 138 134 - 144 mmol/L    Potassium 4.2 3.5 - 5.1 mmol/L    Chloride 100 98 - 107 mmol/L    Carbon Dioxide 30 21 - 31 mmol/L    Anion Gap 8 3 - 14 mmol/L    Glucose 129 (H) 70 - 105 mg/dL    Urea Nitrogen 18 7 - 25 mg/dL    Creatinine 0.74 0.60 - 1.20 mg/dL    GFR Estimate 78 >60 mL/min/1.7m2    GFR Estimate If Black >90 >60 mL/min/1.7m2    Calcium 9.7 8.6 - 10.3 mg/dL    Bilirubin Total 0.5 0.3 - 1.0 mg/dL    " Albumin 3.6 3.5 - 5.7 g/dL    Protein Total 7.2 6.4 - 8.9 g/dL    Alkaline Phosphatase 86 34 - 104 U/L    ALT 10 7 - 52 U/L    AST 12 (L) 13 - 39 U/L   CBC with platelets differential   Result Value Ref Range    WBC 11.7 (H) 4.0 - 11.0 10e9/L    RBC Count 3.93 3.8 - 5.2 10e12/L    Hemoglobin 11.4 (L) 11.7 - 15.7 g/dL    Hematocrit 35.2 35.0 - 47.0 %    MCV 90 78 - 100 fl    MCH 29.0 26.5 - 33.0 pg    MCHC 32.4 31.5 - 36.5 g/dL    RDW 13.2 10.0 - 15.0 %    Platelet Count 341 150 - 450 10e9/L    Diff Method Automated Method     % Neutrophils 77.6 %    % Lymphocytes 12.8 %    % Monocytes 8.5 %    % Eosinophils 0.2 %    % Basophils 0.6 %    % Immature Granulocytes 0.3 %    Absolute Neutrophil 9.0 (H) 1.6 - 8.3 10e9/L    Absolute Lymphocytes 1.5 0.8 - 5.3 10e9/L    Absolute Monocytes 1.0 0.0 - 1.3 10e9/L    Absolute Eosinophils 0.0 0.0 - 0.7 10e9/L    Absolute Basophils 0.1 0.0 - 0.2 10e9/L    Abs Immature Granulocytes 0.0 0 - 0.4 10e9/L   *UA reflex to Microscopic   Result Value Ref Range    Color Urine Brown     Appearance Urine Clear     Glucose Urine Negative NEG^Negative mg/dL    Bilirubin Urine Small (A) NEG^Negative    Ketones Urine Negative NEG^Negative mg/dL    Specific Gravity Urine 1.020 1.003 - 1.035    Blood Urine Large (A) NEG^Negative    pH Urine 6.5 5.0 - 7.0 pH    Protein Albumin Urine >299 (A) NEG^Negative mg/dL    Urobilinogen Urine 1.0 0.2 - 1.0 EU/dL    Nitrite Urine Positive (A) NEG^Negative    Leukocyte Esterase Urine Moderate (A) NEG^Negative    Source Midstream Urine    Urine Microscopic   Result Value Ref Range    WBC Urine 25-50 (A) OTO5^0 - 5 /HPF    RBC Urine  (A) OTO2^O - 2 /HPF    Squamous Epithelial /LPF Urine Few FEW^Few /LPF    Bacteria Urine Moderate (A) NEG^Negative /HPF   CT Abdomen Pelvis w Contrast    Narrative    PROCEDURE:  CT ABDOMEN PELVIS W CONTRAST    HISTORY: Abd pain,  recent fall, groin pain, vomiting no oral  contrast;     TECHNIQUE:  Helical CT of the abdomen and  pelvis was performed  following injection of intravenous contrast.  Ingested oral contrast  partially opacifies the bowel.      COMPARISON:  5/9/2017    MEDS/CONTRAST: omnipaque 350 100 ml    FINDINGS:      Limited images through the lung bases demonstrate new nodular and  tree-in-bud consolidation at the right lung base. No pericardial or  pleural effusions are seen.    Postoperative changes of prior cholecystectomy are again seen.  Pneumobilia is present. There is persistent panbiliary dilatation.  Focal low-density lesions within the liver remain stable, likely  cysts.    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 There is chronic left  hydronephrosis. The spleen is unremarkable. Multifocal bowel surgical  changes are redemonstrated. The bowel caliber is unchanged. The aorta  demonstrates preserved caliber despite advanced atherosclerotic  calcifications. High-grade iliac narrowing is suggested.    No free fluid, free air or adenopathy is present. The bones are  osteoporotic. There is new subchondral sclerosis within the left  sacral isael, suggesting a subacute to chronic sacral insufficiency  fracture.      Impression    IMPRESSION:      Right lower lobe nodular consolidation suggests acute pneumonia or  other pneumonitis. Recommend immediate follow-up radiographs to set a  baseline for further follow-up.    Age-indeterminate, acute to subacute left sacral ala osteoporotic  pathologic fracture.    SOUMYA EDGAR MD       Assessments & Plan (with Medical Decision Making)       Final diagnoses:   Pneumonia of right lung due to infectious organism, unspecified part of lung   Cystitis   Closed nondisplaced zone III fracture of sacrum, initial encounter (H)     Discussed with Dr. Perez regarding admission for the above diagnoses, patient was accepted for admission.  She received pain control and antibiotics as well as serial nebulizers here in the emergency department.  3/8/2018   [facility]      Fantasma Corrales MD  03/08/18 0743

## 2018-03-09 ENCOUNTER — APPOINTMENT (OUTPATIENT)
Dept: PHYSICAL THERAPY | Facility: OTHER | Age: 73
DRG: 194 | End: 2018-03-09
Attending: INTERNAL MEDICINE
Payer: COMMERCIAL

## 2018-03-09 ENCOUNTER — APPOINTMENT (OUTPATIENT)
Dept: OCCUPATIONAL THERAPY | Facility: OTHER | Age: 73
DRG: 194 | End: 2018-03-09
Attending: INTERNAL MEDICINE
Payer: COMMERCIAL
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
## 2018-03-09 PROBLEM — E83.42 HYPOMAGNESEMIA: Status: ACTIVE | Noted: 2018-03-09

## 2018-03-09 PROBLEM — J18.9 CAP (COMMUNITY ACQUIRED PNEUMONIA): Status: RESOLVED | Noted: 2018-03-08 | Resolved: 2018-03-09

## 2018-03-09 PROBLEM — D64.9 ANEMIA: Status: ACTIVE | Noted: 2018-03-09

## 2018-03-09 PROBLEM — R82.81 PYURIA: Status: ACTIVE | Noted: 2018-03-08
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
## 2018-03-09 LAB
ANION GAP SERPL CALCULATED.3IONS-SCNC: 8 MMOL/L (ref 3–14)
BUN SERPL-MCNC: 16 MG/DL (ref 7–25)
CALCIUM SERPL-MCNC: 9.2 MG/DL (ref 8.6–10.3)
CHLORIDE SERPL-SCNC: 102 MMOL/L (ref 98–107)
CO2 SERPL-SCNC: 29 MMOL/L (ref 21–31)
CREAT SERPL-MCNC: 0.78 MG/DL (ref 0.6–1.2)
ERYTHROCYTE [DISTWIDTH] IN BLOOD BY AUTOMATED COUNT: 13.3 % (ref 10–15)
GFR SERPL CREATININE-BSD FRML MDRD: 73 ML/MIN/1.7M2
GLUCOSE SERPL-MCNC: 107 MG/DL (ref 70–105)
HCT VFR BLD AUTO: 31.7 % (ref 35–47)
HGB BLD-MCNC: 9.9 G/DL (ref 11.7–15.7)
MAGNESIUM SERPL-MCNC: 1.6 MG/DL (ref 1.9–2.7)
MCH RBC QN AUTO: 28.5 PG (ref 26.5–33)
MCHC RBC AUTO-ENTMCNC: 31.2 G/DL (ref 31.5–36.5)
MCV RBC AUTO: 91 FL (ref 78–100)
PLATELET # BLD AUTO: 293 10E9/L (ref 150–450)
POTASSIUM SERPL-SCNC: 3.9 MMOL/L (ref 3.5–5.1)
RBC # BLD AUTO: 3.47 10E12/L (ref 3.8–5.2)
SODIUM SERPL-SCNC: 139 MMOL/L (ref 134–144)
WBC # BLD AUTO: 10.1 10E9/L (ref 4–11)

## 2018-03-09 PROCEDURE — 94640 AIRWAY INHALATION TREATMENT: CPT | Mod: 76

## 2018-03-09 PROCEDURE — 97165 OT EVAL LOW COMPLEX 30 MIN: CPT | Mod: GO | Performed by: OCCUPATIONAL THERAPIST

## 2018-03-09 PROCEDURE — 40000133 ZZH STATISTIC OT WARD VISIT: Performed by: OCCUPATIONAL THERAPIST

## 2018-03-09 PROCEDURE — 97530 THERAPEUTIC ACTIVITIES: CPT | Mod: GP

## 2018-03-09 PROCEDURE — 99232 SBSQ HOSP IP/OBS MODERATE 35: CPT | Performed by: INTERNAL MEDICINE

## 2018-03-09 PROCEDURE — 40000193 ZZH STATISTIC PT WARD VISIT

## 2018-03-09 PROCEDURE — 12000000 ZZH R&B MED SURG/OB

## 2018-03-09 PROCEDURE — 25000125 ZZHC RX 250: Performed by: INTERNAL MEDICINE

## 2018-03-09 PROCEDURE — 85027 COMPLETE CBC AUTOMATED: CPT | Performed by: INTERNAL MEDICINE

## 2018-03-09 PROCEDURE — 97116 GAIT TRAINING THERAPY: CPT | Mod: GP

## 2018-03-09 PROCEDURE — 36415 COLL VENOUS BLD VENIPUNCTURE: CPT | Performed by: INTERNAL MEDICINE

## 2018-03-09 PROCEDURE — 40000275 ZZH STATISTIC RCP TIME EA 10 MIN

## 2018-03-09 PROCEDURE — 94640 AIRWAY INHALATION TREATMENT: CPT

## 2018-03-09 PROCEDURE — 25000132 ZZH RX MED GY IP 250 OP 250 PS 637: Performed by: INTERNAL MEDICINE

## 2018-03-09 PROCEDURE — 97161 PT EVAL LOW COMPLEX 20 MIN: CPT | Mod: GP

## 2018-03-09 PROCEDURE — 25000128 H RX IP 250 OP 636: Performed by: INTERNAL MEDICINE

## 2018-03-09 PROCEDURE — 83735 ASSAY OF MAGNESIUM: CPT | Performed by: INTERNAL MEDICINE

## 2018-03-09 PROCEDURE — 97535 SELF CARE MNGMENT TRAINING: CPT | Mod: GO | Performed by: OCCUPATIONAL THERAPIST

## 2018-03-09 PROCEDURE — 80048 BASIC METABOLIC PNL TOTAL CA: CPT | Performed by: INTERNAL MEDICINE

## 2018-03-09 RX ORDER — VENLAFAXINE HYDROCHLORIDE 75 MG/1
75 CAPSULE, EXTENDED RELEASE ORAL AT BEDTIME
Status: DISCONTINUED | OUTPATIENT
Start: 2018-03-09 | End: 2018-03-12 | Stop reason: HOSPADM

## 2018-03-09 RX ORDER — ACETAMINOPHEN 325 MG/1
650 TABLET ORAL 4 TIMES DAILY
Status: DISCONTINUED | OUTPATIENT
Start: 2018-03-09 | End: 2018-03-12 | Stop reason: HOSPADM

## 2018-03-09 RX ORDER — ALBUTEROL SULFATE 0.83 MG/ML
2.5 SOLUTION RESPIRATORY (INHALATION) 4 TIMES DAILY
Status: DISCONTINUED | OUTPATIENT
Start: 2018-03-09 | End: 2018-03-12 | Stop reason: HOSPADM

## 2018-03-09 RX ORDER — ALBUTEROL SULFATE 0.83 MG/ML
1 SOLUTION RESPIRATORY (INHALATION)
Status: DISCONTINUED | OUTPATIENT
Start: 2018-03-09 | End: 2018-03-12 | Stop reason: HOSPADM

## 2018-03-09 RX ADMIN — KETOROLAC TROMETHAMINE 15 MG: 15 INJECTION, SOLUTION INTRAMUSCULAR; INTRAVENOUS at 05:50

## 2018-03-09 RX ADMIN — FLUTICASONE PROPIONATE AND SALMETEROL 1 PUFF: 50; 250 POWDER RESPIRATORY (INHALATION) at 07:15

## 2018-03-09 RX ADMIN — ACETAMINOPHEN 650 MG: 325 TABLET, FILM COATED ORAL at 11:59

## 2018-03-09 RX ADMIN — METOPROLOL SUCCINATE 25 MG: 25 TABLET, EXTENDED RELEASE ORAL at 09:33

## 2018-03-09 RX ADMIN — OXYCODONE HYDROCHLORIDE 5 MG: 5 TABLET ORAL at 23:00

## 2018-03-09 RX ADMIN — LISINOPRIL 2.5 MG: 2.5 TABLET ORAL at 22:23

## 2018-03-09 RX ADMIN — ACETAMINOPHEN 650 MG: 325 TABLET, FILM COATED ORAL at 22:23

## 2018-03-09 RX ADMIN — OXYCODONE HYDROCHLORIDE 5 MG: 5 TABLET ORAL at 01:16

## 2018-03-09 RX ADMIN — ACETAMINOPHEN 650 MG: 325 TABLET, FILM COATED ORAL at 16:52

## 2018-03-09 RX ADMIN — ACETAMINOPHEN 650 MG: 325 TABLET, FILM COATED ORAL at 05:49

## 2018-03-09 RX ADMIN — SODIUM CHLORIDE: 900 INJECTION, SOLUTION INTRAVENOUS at 05:51

## 2018-03-09 RX ADMIN — ASPIRIN 81 MG: 81 TABLET, COATED ORAL at 09:33

## 2018-03-09 RX ADMIN — PANTOPRAZOLE SODIUM 40 MG: 40 TABLET, DELAYED RELEASE ORAL at 07:51

## 2018-03-09 RX ADMIN — LEVOFLOXACIN 750 MG: 750 TABLET, FILM COATED ORAL at 09:33

## 2018-03-09 RX ADMIN — OXYCODONE HYDROCHLORIDE 5 MG: 5 TABLET ORAL at 16:53

## 2018-03-09 RX ADMIN — FLUTICASONE PROPIONATE AND SALMETEROL 1 PUFF: 50; 250 POWDER RESPIRATORY (INHALATION) at 18:49

## 2018-03-09 RX ADMIN — ALBUTEROL SULFATE 2.5 MG: 2.5 SOLUTION RESPIRATORY (INHALATION) at 14:55

## 2018-03-09 RX ADMIN — VENLAFAXINE HYDROCHLORIDE 150 MG: 75 CAPSULE, EXTENDED RELEASE ORAL at 07:51

## 2018-03-09 RX ADMIN — ATORVASTATIN CALCIUM 20 MG: 20 TABLET, FILM COATED ORAL at 09:33

## 2018-03-09 RX ADMIN — KETOROLAC TROMETHAMINE 15 MG: 15 INJECTION, SOLUTION INTRAMUSCULAR; INTRAVENOUS at 23:00

## 2018-03-09 RX ADMIN — VENLAFAXINE HYDROCHLORIDE 75 MG: 75 CAPSULE, EXTENDED RELEASE ORAL at 22:59

## 2018-03-09 RX ADMIN — ALBUTEROL SULFATE 2.5 MG: 2.5 SOLUTION RESPIRATORY (INHALATION) at 18:49

## 2018-03-09 RX ADMIN — OXYCODONE HYDROCHLORIDE 5 MG: 5 TABLET ORAL at 07:51

## 2018-03-09 NOTE — PROGRESS NOTES
[facility]    Hospitalist Progress Note    ED notes, H&P, CXR report, CT Abd/pelv report and lab results rev'd    Assessment & Plan   Jaida Nieto is a 72 year old female who was admitted on 3/8/2018.    Principal Problem:  Community acquired pneumonia of right lung, acute, POA    Assessment: no hypoxia, T max 24 hour 99.5.  Initial WBC count 11.7, now normal.  3/8/2018 influenza PCR negative.  3/8/2018 sputum cx negative so far.  CXR showed: nodular opacities at the right lung base are faintly appreciated but much less well seen when compared to the recent CT. No effusion or pneumothorax is seen.  CT Abd/pelv showed: Limited images through the lung bases demonstrate new nodular and tree-in-bud consolidation at the right lung base. No pericardial or pleural effusions are seen.    Plan: cont levaquin 750 mg po daily; cont advair bid; will start alb neb qid and q 2 hours prn; RCAT today    Active Problems:  COPD exacerbation, acute, POA    Assessment: possible; appears mild      Pelvic pain, acute, POA    Assessment: not controlled; pt has used oxycodone 2 times in the past 24 hours    Plan: start tylenol 650 mg po qid; cont toradol 15 mg q 6 hours prn and oxycodone 5 mg po q 4 hours prn pain; cont PT and OT assessments    Closed fracture of sacrum, acute, POA    Assessment: 3/8/2018 CT Abd/pelv showed: new subchondral sclerosis within the left sacral isael, suggesting a subacute to chronic sacral insufficiency fracture.    Plan: see above    Fall at home, acute, POA    Assessment: per pt occurred at home a week ago    Plan: cont PT and OT    Anemia, acute, POA    Assessment: hgb was 12.3 on 5/9/2017; no known blood loss per pt.  3/8/2018 CT ABDOMEN PELVIS W CONTRAST: COMPARISON:  5/9/2017  FINDINGS:  Limited images through the lung bases demonstrate new nodular and tree-in-bud consolidation at the right lung base. No pericardial or pleural effusions are seen.  Postoperative changes of prior  cholecystectomy are again seen.  Pneumobilia is present. There is persistent panbiliary dilatation.  Focal low-density lesions within the liver remain stable, likely cysts.  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 There is chronic left hydronephrosis. The spleen is unremarkable. Multifocal bowel surgical changes are redemonstrated. The bowel caliber is unchanged. The aorta demonstrates preserved caliber despite advanced atherosclerotic calcifications. High-grade iliac narrowing is suggested.  No free fluid, free air or adenopathy is present. The bones are osteoporotic. There is new subchondral sclerosis within the left sacral isael, suggesting a subacute to chronic sacral insufficiency fracture.  IMPRESSION: Right lower lobe nodular consolidation suggests acute pneumonia or other pneumonitis. Recommend immediate follow-up radiographs to set a baseline for further follow-up.  Age-indeterminate, acute to subacute left sacral ala osteoporotic pathologic fracture.     Plan: repeat cbc in the morning    Pyuria, acute, POA    Assessments: asymptomatic per d/w pt; 3/8/2018 UA: 25-50 WBC's,  RBC's, moderate bacteria; 3/8/2018 Urine cx: negative to date    Plan: monitor urine cx    Hypomagnesemia, acute, POA    Assessment: mild    Plan: mag [facility] protocol    COPD (chronic obstructive pulmonary disease), chronic    Assessment: not oxygen or steroid dependent    Hypertension, chronic    Assessment: acutely controlled;     Plan: cont usual lisinopril 2.5 mg po daily and toprol 25 mg po daily; routine vitals            # Pain Assessment:   Current Pain Score 3/9/2018 3/9/2018 3/9/2018   Patient currently in pain? - yes yes   Pain score (0-10) 8 8 8   Pain location Groin Groin Groin   Pain descriptors Sharp Sharp Sharp   - Jaida is experiencing pain due to pelvic fx. Pain management was discussed and the plan was created in a collaborative fashion.  Jaida's response to the current recommendations: engaged  - see above for  plan        DVT Prophylaxis: Pneumatic Compression Devices    Code Status: Full Code    Timbo Sandhu MD    Interval History   Feels a little better than yesterday.  Pain on the left side is her biggest problem.  She fell at home about a week ago.  The pain continued to get worse.  Currently 8/10.  Open to any idea.  Has been able to get up to the bathroom with the nurse and a 2 wheeled walker.  She has a 2 wheeled walker at home.  Breathing is about the same.  Coughing a lot but nothing is coming up.  No sob but she has not been doing much yet.  Denies sob with being in bed    ROS:  Gen: no chills or sweats or shakes  HEENT: no sore throat  Pulm: no wheezing  Card: no palp; no chest pain or pressure or tightness  GI: no heartburn or nausea or stomach pain   M/S: only pain is the pain at the left side of her pelvic bone; denies leg pain and low back pain    -Data reviewed today: I reviewed all new labs and imaging results over the last 24 hours.       Physical Exam   Temp: 96.7  F (35.9  C) Temp src: Tympanic BP: 122/70 Pulse: 85   Resp: 20 SpO2: 95 % O2 Device: None (Room air)    Vitals:    03/08/18 1014 03/08/18 1404 03/09/18 0645   Weight: 52.2 kg (115 lb) 48.6 kg (107 lb 2.3 oz) 49 kg (108 lb 0.4 oz)     Vital Signs with Ranges  Temp:  [95.8  F (35.4  C)-99.5  F (37.5  C)] 96.7  F (35.9  C)  Pulse:  [79-94] 85  Resp:  [16-24] 20  BP: ()/(52-77) 122/70  SpO2:  [77 %-96 %] 95 %  I/O last 3 completed shifts:  In: 1535 [P.O.:625; I.V.:910]  Out: 675 [Urine:675]    Constitutional: uncomfortable  Respiratory: mild exp wheezing lower 1/2 of left lung; o/w CTA; no rhonchi or crackles  Cardiovascular: reg rate and rhythm; normal S1 and S2, no S3 or S4, no murmur or rub; no peripheral edema in L/E's or U/E's  GI: flat; normal bs, no bruits; soft and no tenderness with palp in all quadrants  Skin/Integumen: no bruising over the buttocks and lower abd and thighs    Medications     sodium chloride 75 mL/hr at 03/09/18  0551       aspirin EC  81 mg Oral Daily     atorvastatin  20 mg Oral Daily     fluticasone-salmeterol  1 puff Inhalation Q12H     lisinopril  2.5 mg Oral At Bedtime     metoprolol succinate  25 mg Oral Daily     venlafaxine  150 mg Oral Daily with breakfast     levofloxacin  750 mg Oral Daily     pantoprazole  40 mg Oral QAM AC     sodium chloride (PF)  5 mL Intravenous Q12H       Data     Recent Labs  Lab 03/09/18  0428 03/08/18  1055 03/08/18  1053   WBC 10.1 11.7*  --    HGB 9.9* 11.4*  --    MCV 91 90  --     341  --     138  --    POTASSIUM 3.9 4.2  --    CHLORIDE 102 100  --    CO2 29 30  --    BUN 16 18  --    CR 0.78 0.74  --    ANIONGAP 8 8  --    DARRIUS 9.2 9.7  --    * 129*  --    ALBUMIN  --  3.6  --    PROTTOTAL  --  7.2  --    BILITOTAL  --  0.5  --    ALKPHOS  --  86  --    ALT  --  10  --    AST  --  12*  --    LIPASE  --   --  8*       Recent Results (from the past 24 hour(s))   CT Abdomen Pelvis w Contrast    Narrative    PROCEDURE:  CT ABDOMEN PELVIS W CONTRAST    HISTORY: Abd pain,  recent fall, groin pain, vomiting no oral  contrast;     TECHNIQUE:  Helical CT of the abdomen and pelvis was performed  following injection of intravenous contrast.  Ingested oral contrast  partially opacifies the bowel.      COMPARISON:  5/9/2017    MEDS/CONTRAST: omnipaque 350 100 ml    FINDINGS:      Limited images through the lung bases demonstrate new nodular and  tree-in-bud consolidation at the right lung base. No pericardial or  pleural effusions are seen.    Postoperative changes of prior cholecystectomy are again seen.  Pneumobilia is present. There is persistent panbiliary dilatation.  Focal low-density lesions within the liver remain stable, likely  cysts.    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 There is chronic left  hydronephrosis. The spleen is unremarkable. Multifocal bowel surgical  changes are redemonstrated. The bowel caliber is unchanged. The aorta  demonstrates preserved caliber  despite advanced atherosclerotic  calcifications. High-grade iliac narrowing is suggested.    No free fluid, free air or adenopathy is present. The bones are  osteoporotic. There is new subchondral sclerosis within the left  sacral isael, suggesting a subacute to chronic sacral insufficiency  fracture.      Impression    IMPRESSION:      Right lower lobe nodular consolidation suggests acute pneumonia or  other pneumonitis. Recommend immediate follow-up radiographs to set a  baseline for further follow-up.    Age-indeterminate, acute to subacute left sacral ala osteoporotic  pathologic fracture.    SOUMYA EDGAR MD   X-ray Chest 2 vws*    Narrative    PROCEDURE:  XR CHEST 2 VW    HISTORY: pneumonia seen on CT abd; , .    COMPARISON:  CT 3/8/18    FINDINGS:  The cardiomediastinal contours are stable.  The trachea is midline.   There is calcific aortic atherosclerosis.  The known nodular opacities at the right lung base are faintly  appreciated but much less well seen when compared to the recent CT. No  effusion or pneumothorax is seen.  No suspicious osseous lesion or subdiaphragmatic free air.      Impression    IMPRESSION:      Fairly poor visualization of the known micronodular infiltrate at the  right lung base. Given the presence of a dominant nodule within that  area, a follow-up CT chest in 6 weeks after appropriate clinical  therapy should be considered.      SOUMYA EDGAR MD

## 2018-03-09 NOTE — PLAN OF CARE
Problem: PT General Care Plan  Goal: Gait (PT)  PT Gait  Discharge Planner PT   Patient plan for discharge: return home   Current status: requiring CGA for safety with mobility; pelvic pain and decreased activity tolerance  Barriers to return to prior living situation: pain and fatigue  Recommendations for discharge: possible continued PT   Rationale for recommendations: to promote strength, stability and mobility       Entered by: Justin Rolle 03/09/2018 2:15 PM

## 2018-03-09 NOTE — PHARMACY-ADMISSION MEDICATION HISTORY
Pharmacy -- Admission Medication Reconciliation    Prior to admission (PTA) medications were reviewed and the patient's PTA medication list was updated.    Sources Consulted: Kevin STONE, Patient interview    The reliability of this Medication Reconciliation is: Reliability: Reliable    The following significant changes were made:  -Albuterol directions added  -Lipitor updated to QHS  -Symbicort updated to 2 puffs once daily   -Vitamin D directions added  -Clonazepam directions added  -Advair removed  -Hydroxyzine removed  -Lisinopril updated to QAM  -Relafen removed  -Omeprazole directions added  -Simvastatin removed  -Effexor  mg directions added to QAM  -Effexor XR 75 mg directions added to QHS    In addition, the patient's allergies were reviewed with the patient and updated as follows:   Allergies: Codeine; Gabapentin; Nortriptyline; and Sucralfate    The pharmacist has reviewed with the patient that all personal medications should be removed from the building or locked in the belongings safe.  Patient shall only take medications ordered by the physician and administered by the nursing staff.       Medication barriers identified: Patient wishes to obtain certification for the medical cannabis program, but has been unable to find a provider to do this.   Medication adherence concerns: Not taking Symbicort as prescribed; patient was unaware it is to be taken twice daily   Understanding of emergency medications: Has albuterol inhaler and nebs at home    Donta Shi [facility], 3/9/2018,  9:16 AM

## 2018-03-09 NOTE — PROGRESS NOTES
03/09/18 1000   Living Environment   Lives With alone   Living Arrangements house   Home Accessibility no concerns;bed and bath on same level   Number of Stairs to Enter Home 0   Number of Stairs Within Home 0   Stair Railings at Home none   Transportation Available (pt does not drive )   Functional Level Prior   Ambulation 1-->assistive equipment   Transferring 1-->assistive equipment   Toileting 1-->assistive equipment   Bathing 1-->assistive equipment   Dressing 0-->independent   Eating 0-->independent   Communication 0-->understands/communicates without difficulty   Swallowing 0-->swallows foods/liquids without difficulty   Cognition 0 - no cognition issues reported   Cognitive Status Examination   Orientation orientation to person, place and time   Level of Consciousness alert   Visual Perception   Visual Perception No deficits were identified   Pain Assessment   Patient Currently in Pain Yes, see Vital Sign flowsheet   Range of Motion (ROM)   ROM Quick Adds No deficits were identified   Strength   Manual Muscle Testing Quick Adds (overall decreased stength )   Mobility   Bed Mobility Bed mobility skill: Supine to sit   Bed Mobility Skill: Supine to Sit   Level of [place]: Supine/Sit stand-by assist   Physical Assist/Nonphysical Assist: Supine/Sit supervision   Transfer Skills   Transfer Transfer Safety Analysis Sit/Stand   Transfer Skill: Bed to Chair/Chair to Bed   Level of [place]: Bed to Chair contact guard   Transfer Skill: Sit to Stand   Level of [place]: Sit/Stand contact guard   Tub/Shower Transfer   Tub/Shower Transfer Transfer Skill: Tub/Shower Transfers   Transfer Skill: Tub/Shower Transfer   Level of [place]: Tub/Shower contact guard   Physical Assist/Nonphysical Assist: Tub/Shower 1 person assist   Bathing   Level of [place] contact guard   Physical Assist/Nonphysical Assist 1 person assist   Upper Body Dressing   Level of [place]: Dress Upper Body stand-by assist    Physical Assist/Nonphysical Assist: Dress Upper Body supervision   Lower Body Dressing   Level of [place]: Dress Lower Body minimum assist (75% patients effort)   Physical Assist/Nonphysical Assist: Dress Lower Body 1 person assist   Eating/Self Feeding   Level of [place]: Eating stand-by assist   Physical Assist/Nonphysical Assist: Eating set-up required   Clinical Impression   Criteria for Skilled Therapeutic Interventions Met yes, treatment indicated   OT Diagnosis pneumonia, UTI, saccral fx    Influenced by the following impairments weakness and pain    Assessment of Occupational Performance 1-3 Performance Deficits   Identified Performance Deficits mobility and self care    Clinical Decision Making (Complexity) Low complexity   Therapy Frequency daily   Predicted Duration of Therapy Intervention (days/wks) 2-3 days    Anticipated Equipment Needs at Discharge (none )   Anticipated Discharge Disposition Home with Assist   Risks and Benefits of Treatment have been explained. Yes   Patient, Family & other staff in agreement with plan of care Yes   Total Evaluation Time   Total Evaluation Time (Minutes) 15

## 2018-03-09 NOTE — PLAN OF CARE
Problem: Patient Care Overview  Goal: Plan of Care/Patient Progress Review  Outcome: No Change  No change since initial assessment at  0754. Rose Braun RN 3/9/2018 4:36 PM

## 2018-03-09 NOTE — PLAN OF CARE
Problem: Patient Care Overview  Goal: Plan of Care/Patient Progress Review  Discharge Planner OT   Patient plan for discharge: to return home as previous   Current status: pt ambulated in room with CGA using FWW, showered with supervision and CGA when on feet. Fatigued easily.   Barriers to return to prior living situation: weakness, pt has PCA assist 8-10 hours per week   Recommendations for discharge: home with assist as previous, possible home care as needed, will continue to assess   Rationale for recommendations: weakness        Entered by: Mishel Aguirre 03/09/2018 1:22 PM

## 2018-03-09 NOTE — PROGRESS NOTES
RCAT completed with pt acuity of 5. No further respiratory recommendations at this time. Ellen Davidson RT

## 2018-03-09 NOTE — PROGRESS NOTES
03/09/18 1400   Quick Adds   Type of Visit Initial PT Evaluation   Living Environment   Lives With alone   Living Arrangements house   Home Accessibility no concerns;bed and bath on same level   Number of Stairs to Enter Home 0   Number of Stairs Within Home 0   Stair Railings at Home none   Functional Level Prior   Ambulation 1-->assistive equipment   Transferring 1-->assistive equipment   Toileting 1-->assistive equipment   Cognition 0 - no cognition issues reported   Fall history within last six months yes   Number of times patient has fallen within last six months 1   Which of the above functional risks had a recent onset or change? ambulation   General Information   Referring Physician Edson   Patient/Family Goals Statement return home   Precautions/Limitations fall precautions   Weight-Bearing Status - LLE full weight-bearing   Weight-Bearing Status - RLE full weight-bearing   Cognitive Status Examination   Orientation orientation to person, place and time   Level of Consciousness alert   Follows Commands and Answers Questions 100% of the time   Personal Safety and Judgment intact   Memory intact   Pain Assessment   Patient Currently in Pain Yes, see Vital Sign flowsheet   Range of Motion (ROM)   ROM Quick Adds No deficits were identified   Strength   Manual Muscle Testing Quick Adds No deficits were identified   Gait   Gait Gait Skill   Gait Skills   Level of [place]: Gait contact guard   Physical Assist/Nonphysical Assist: Gait 1 person assist   Weight-Bearing Restrictions: Gait full weight-bearing   Assistive Device for Transfer: Gait rolling walker   Gait Distance 100 feet   Balance   Balance (fair with Fww for stability)   Sensory Examination   Sensory Perception no deficits were identified   Coordination   Coordination no deficits were identified   General Therapy Interventions   Planned Therapy Interventions gait training;bed mobility training;transfer training   Clinical Impression   Criteria  for Skilled Therapeutic Intervention yes, treatment indicated   PT Diagnosis impaired mobility   Functional limitations due to impairments gait   Clinical Presentation Stable/Uncomplicated   Clinical Decision Making (Complexity) Low complexity   Therapy Frequency` daily   Predicted Duration of Therapy Intervention (days/wks) 2-3 days   Anticipated Equipment Needs at Discharge front wheeled walker   Anticipated Discharge Disposition Home with Home Therapy;Home with Assist   Risk & Benefits of therapy have been explained Yes   Patient, Family & other staff in agreement with plan of care Yes   Total Evaluation Time   Total Evaluation Time (Minutes) 15

## 2018-03-09 NOTE — PLAN OF CARE
Problem: Patient Care Overview  Goal: Plan of Care/Patient Progress Review  Outcome: No Change  Pt is here with pneumonia, UTI and sacral fracture. Pt has a weak cough, sputum culture sent. LS coarse on intial assessment then clear. Oxygen sats mid 90's on RA. Pt has burning on urination. Afebrile. BP low at HS: 93/56. Held lisinopril, per MD. C/o moderate/severe groin pain. Alternating tyl, oxycodone and toradol for pain control. Able to sleep in between pain meds without complaints. Will continue to assess.

## 2018-03-09 NOTE — PROGRESS NOTES
"Clinical Nutrition / Initial Assessment     Reason for Assessment:  low BMI, reported wt loss    Assessment:   Client History:  Pt stated her wt fluctuates but has been fairly stable around current. Wt was 110# on 5/9/17 and is 108# today. Primarily drinks Boost as she has had many stomach surgeries and cannot tolerate large amounts of food. She typically drinks 6-7 per day. If takes Originial Boost, this will provide ~9099-0121 kcal, 60-70 g protein. Not willing to try the Ensure here but will take a peach boost breeze. Was tolerating her soup and prefers liquids. She stated she is doing well and has no questions or concerns at this time  Diet Order:  Regular  Oral Intake:  Eating well of what she orders  Weight:  Vitals:    03/08/18 1014 03/08/18 1404 03/09/18 0645   Weight: 52.2 kg (115 lb) 48.6 kg (107 lb 2.3 oz) 49 kg (108 lb 0.4 oz)     Wt Readings from Last 4 Encounters:   03/09/18 49 kg (108 lb 0.4 oz)   01/06/16 54.3 kg (119 lb 9.6 oz)   12/22/15 54 kg (119 lb)   11/30/15 54.1 kg (119 lb 3.2 oz)     Height: 5' 7.5\"  BMI: Body mass index is 16.67 kg/(m^2).  Estimated nutrition needs using current body wt of 49 kg (108#)  1715 kcal (35 kcal/kg)  49-59 g protein (1-1.2 g/kg)    Malnutrition Criteria:  (Need to have 2 indicators to qualify recommendation)  Energy Intake:  Adequate   Interpretation of Weight Loss:  No significant weight loss  Physical Findings:  Chronic Body Fat Loss:  mild and Chronic Muscle Mass Loss:  mild  Reduced  Strength:  Not Measured  Recommended Diagnosis:  Recommended Nutrition Diagnosis:   Not enough Malnutrition indicators found for medical diagnosis      Nutrition Education: Nutrition education will be provided as appropriate.    Nutrition Diagnosis: Weight:   Underweight related to hx of inadequate oral intakes, inability to consume solid foods d/t stomach surgeries as evidenced by BMI under 20, however wt has been fairly stable.    Intervention:  Nutrition Prescription: " regular diet, supplements as desired    Nutrition Intervention(s):Recommended general, healthful diet  1. Meals and Snacks: regular  2. Medical Food Supplement: boost breeze daily    Nutrition Goal(s):  1. Pt will not have unplanned wt loss during hospitalization  2. Pt will consume 50% or more of what she orders at meals  3. Pt will tolerate diet    Monitoring and Evaluation:   Food Intake   Weight  Diet tolerance  Labs    Discharge Recommendation:   Nutrition Discharge Planning  cont with Boost as previous    RD will reassess in within 1-5 days or sooner.    Monse Sun RD on 3/9/2018 at 12:29 PM

## 2018-03-10 ENCOUNTER — APPOINTMENT (OUTPATIENT)
Dept: PHYSICAL THERAPY | Facility: OTHER | Age: 73
DRG: 194 | End: 2018-03-10
Payer: COMMERCIAL

## 2018-03-10 ENCOUNTER — APPOINTMENT (OUTPATIENT)
Dept: OCCUPATIONAL THERAPY | Facility: OTHER | Age: 73
DRG: 194 | End: 2018-03-10
Payer: COMMERCIAL

## 2018-03-10 LAB
BACTERIA SPEC CULT: ABNORMAL
BACTERIA SPEC CULT: NORMAL
GRAM STN SPEC: ABNORMAL
MAGNESIUM SERPL-MCNC: 1.5 MG/DL (ref 1.9–2.7)
MAGNESIUM SERPL-MCNC: 2.5 MG/DL (ref 1.9–2.7)
POTASSIUM SERPL-SCNC: 4.1 MMOL/L (ref 3.5–5.1)
SPECIMEN SOURCE: ABNORMAL
SPECIMEN SOURCE: ABNORMAL
SPECIMEN SOURCE: NORMAL

## 2018-03-10 PROCEDURE — 97116 GAIT TRAINING THERAPY: CPT | Mod: GP

## 2018-03-10 PROCEDURE — 83735 ASSAY OF MAGNESIUM: CPT | Performed by: INTERNAL MEDICINE

## 2018-03-10 PROCEDURE — 97530 THERAPEUTIC ACTIVITIES: CPT | Mod: GP

## 2018-03-10 PROCEDURE — 25000132 ZZH RX MED GY IP 250 OP 250 PS 637: Performed by: INTERNAL MEDICINE

## 2018-03-10 PROCEDURE — 25000128 H RX IP 250 OP 636: Performed by: INTERNAL MEDICINE

## 2018-03-10 PROCEDURE — 84132 ASSAY OF SERUM POTASSIUM: CPT | Performed by: INTERNAL MEDICINE

## 2018-03-10 PROCEDURE — 99232 SBSQ HOSP IP/OBS MODERATE 35: CPT | Performed by: INTERNAL MEDICINE

## 2018-03-10 PROCEDURE — 36415 COLL VENOUS BLD VENIPUNCTURE: CPT | Performed by: INTERNAL MEDICINE

## 2018-03-10 PROCEDURE — 97535 SELF CARE MNGMENT TRAINING: CPT | Mod: GO | Performed by: OCCUPATIONAL THERAPIST

## 2018-03-10 PROCEDURE — 40000275 ZZH STATISTIC RCP TIME EA 10 MIN

## 2018-03-10 PROCEDURE — 12000000 ZZH R&B MED SURG/OB

## 2018-03-10 PROCEDURE — 94640 AIRWAY INHALATION TREATMENT: CPT

## 2018-03-10 PROCEDURE — 25000125 ZZHC RX 250: Performed by: INTERNAL MEDICINE

## 2018-03-10 PROCEDURE — 40000133 ZZH STATISTIC OT WARD VISIT: Performed by: OCCUPATIONAL THERAPIST

## 2018-03-10 PROCEDURE — 40000193 ZZH STATISTIC PT WARD VISIT

## 2018-03-10 PROCEDURE — 94640 AIRWAY INHALATION TREATMENT: CPT | Mod: 76

## 2018-03-10 RX ORDER — BISACODYL 10 MG
10 SUPPOSITORY, RECTAL RECTAL DAILY PRN
Status: DISCONTINUED | OUTPATIENT
Start: 2018-03-10 | End: 2018-03-11

## 2018-03-10 RX ORDER — CLONAZEPAM 0.5 MG/1
0.5 TABLET ORAL 2 TIMES DAILY PRN
Status: DISCONTINUED | OUTPATIENT
Start: 2018-03-10 | End: 2018-03-12 | Stop reason: HOSPADM

## 2018-03-10 RX ADMIN — DOCUSATE SODIUM AND SENNOSIDES 1 TABLET: 8.6; 5 TABLET, FILM COATED ORAL at 09:48

## 2018-03-10 RX ADMIN — ASPIRIN 81 MG: 81 TABLET, COATED ORAL at 09:44

## 2018-03-10 RX ADMIN — ACETAMINOPHEN 650 MG: 325 TABLET, FILM COATED ORAL at 08:48

## 2018-03-10 RX ADMIN — LISINOPRIL 2.5 MG: 2.5 TABLET ORAL at 21:40

## 2018-03-10 RX ADMIN — PANTOPRAZOLE SODIUM 40 MG: 40 TABLET, DELAYED RELEASE ORAL at 08:48

## 2018-03-10 RX ADMIN — OXYCODONE HYDROCHLORIDE 5 MG: 5 TABLET ORAL at 06:53

## 2018-03-10 RX ADMIN — LEVOFLOXACIN 750 MG: 750 TABLET, FILM COATED ORAL at 09:44

## 2018-03-10 RX ADMIN — OXYCODONE HYDROCHLORIDE 5 MG: 5 TABLET ORAL at 21:41

## 2018-03-10 RX ADMIN — CLONAZEPAM 0.5 MG: 0.5 TABLET ORAL at 12:18

## 2018-03-10 RX ADMIN — ALBUTEROL SULFATE 2.5 MG: 2.5 SOLUTION RESPIRATORY (INHALATION) at 11:03

## 2018-03-10 RX ADMIN — METOPROLOL SUCCINATE 25 MG: 25 TABLET, EXTENDED RELEASE ORAL at 09:44

## 2018-03-10 RX ADMIN — DOCUSATE SODIUM AND SENNOSIDES 2 TABLET: 8.6; 5 TABLET, FILM COATED ORAL at 21:40

## 2018-03-10 RX ADMIN — FLUTICASONE PROPIONATE AND SALMETEROL 1 PUFF: 50; 250 POWDER RESPIRATORY (INHALATION) at 07:18

## 2018-03-10 RX ADMIN — VENLAFAXINE HYDROCHLORIDE 75 MG: 75 CAPSULE, EXTENDED RELEASE ORAL at 21:40

## 2018-03-10 RX ADMIN — ALBUTEROL SULFATE 2.5 MG: 2.5 SOLUTION RESPIRATORY (INHALATION) at 18:37

## 2018-03-10 RX ADMIN — OXYCODONE HYDROCHLORIDE 5 MG: 5 TABLET ORAL at 16:44

## 2018-03-10 RX ADMIN — MAGNESIUM SULFATE IN WATER 4 G: 40 INJECTION, SOLUTION INTRAVENOUS at 08:48

## 2018-03-10 RX ADMIN — KETOROLAC TROMETHAMINE 15 MG: 15 INJECTION, SOLUTION INTRAMUSCULAR; INTRAVENOUS at 12:18

## 2018-03-10 RX ADMIN — ACETAMINOPHEN 650 MG: 325 TABLET, FILM COATED ORAL at 21:40

## 2018-03-10 RX ADMIN — ALBUTEROL SULFATE 2.5 MG: 2.5 SOLUTION RESPIRATORY (INHALATION) at 14:46

## 2018-03-10 RX ADMIN — FLUTICASONE PROPIONATE AND SALMETEROL 1 PUFF: 50; 250 POWDER RESPIRATORY (INHALATION) at 18:37

## 2018-03-10 RX ADMIN — ATORVASTATIN CALCIUM 20 MG: 20 TABLET, FILM COATED ORAL at 09:44

## 2018-03-10 RX ADMIN — ACETAMINOPHEN 650 MG: 325 TABLET, FILM COATED ORAL at 16:44

## 2018-03-10 RX ADMIN — ACETAMINOPHEN 650 MG: 325 TABLET, FILM COATED ORAL at 12:17

## 2018-03-10 RX ADMIN — VENLAFAXINE HYDROCHLORIDE 150 MG: 75 CAPSULE, EXTENDED RELEASE ORAL at 08:48

## 2018-03-10 RX ADMIN — ALBUTEROL SULFATE 2.5 MG: 2.5 SOLUTION RESPIRATORY (INHALATION) at 07:12

## 2018-03-10 RX ADMIN — ALUMINUM HYDROXIDE, MAGNESIUM HYDROXIDE, AND DIMETHICONE 30 ML: 400; 400; 40 SUSPENSION ORAL at 21:41

## 2018-03-10 NOTE — PLAN OF CARE
Problem: Patient Care Overview  Goal: Plan of Care/Patient Progress Review  Outcome: No Change  No change since initial assessment at 0946. Rose Braun RN 3/10/2018 3:43 PM

## 2018-03-10 NOTE — PLAN OF CARE
"Problem: Patient Care Overview  Goal: Plan of Care/Patient Progress Review  Outcome: Improving    Last vitals: /70  Pulse 78  Temp 97.7  F (36.5  C) (Tympanic)  Resp 19  Ht 1.715 m (5' 7.5\")  Wt 50.3 kg (110 lb 14.3 oz)  SpO2 94% room air     Pain controlled with prn pain meds. Patient slept. RRL coarse with occasional wheeze otherwise lungs are clear.    Leisa Valverde RN .................................. March 10, 2018 6:40 AM          "

## 2018-03-10 NOTE — PLAN OF CARE
Problem: Patient Care Overview  Goal: Plan of Care/Patient Progress Review  Patient ambulating halls with SBA using walker. VSS, klonopin 0.5 mg given at 1218 for anxiety. Patient missing her dog and eager to go home. Rose Braun RN 3/10/2018 5:32 PM

## 2018-03-10 NOTE — PLAN OF CARE
Problem: Patient Care Overview  Goal: Plan of Care/Patient Progress Review  Patient continues to have burning with urination, VSS, Pain controlled with roxicodone and scheduled tylenol. Patient up in chair for meals, Up with 1 assist. Rose Braun RN 3/9/2018 7:41 PM

## 2018-03-10 NOTE — PROGRESS NOTES
[facility]    Hospitalist Progress Note    Interim notes rev'd    Assessment & Plan   Jaida Nieto is a 72 year old female who was admitted on 3/8/2018.    Principal Problem:  Community acquired pneumonia of right lung, acute, POA    Assessment: no hypoxia, T max 48 hour 99.5.  Initial WBC count 11.7, then 10.1.  3/8/2018 influenza PCR negative.  3/8/2018 CXR showed: nodular opacities at the right lung base are faintly appreciated but much less well seen when compared to the recent CT. No effusion or pneumothorax is seen.  CT Abd/pelv showed: Limited images through the lung bases demonstrate new nodular and tree-in-bud consolidation at the right lung base. No pericardial or pleural effusions are seen. 3/8/2018 sputum cx negative so far.     Plan: cont levaquin 750 mg po daily, day 3; cont advair bid; cont alb neb qid and q 2 hours prn; RCAT today    Active Problems:  COPD exacerbation, acute, POA    Assessment: possible; appears mild      Pelvic pain, acute, POA    Assessment: not controlled; pt has used oxycodone 2 times in the past 24 hours; has used toradol twice in the past 24 hours and oxycodone 3 times in the past 24 hours    Plan: cont tylenol 650 mg po qid; cont toradol 15 mg q 6 hours prn and oxycodone 5 mg po q 4 hours prn pain; cont PT and OT assessments; ambulate in gil more today    Closed fracture of sacrum, acute, POA    Assessment: 3/8/2018 CT Abd/pelv showed: new subchondral sclerosis within the left sacral isael, suggesting a subacute to chronic sacral insufficiency fracture.    Plan: see above    Fall at home, acute, POA    Assessment: per pt occurred at home a week ago    Plan: cont PT and OT    Anemia, acute, POA    Assessment: hgb was 12.3 on 5/9/2017; no known blood loss per pt.  3/8/2018 CT ABDOMEN PELVIS W CONTRAST: COMPARISON:  5/9/2017  FINDINGS:  Limited images through the lung bases demonstrate new nodular and tree-in-bud consolidation at the right lung base. No  pericardial or pleural effusions are seen.  Postoperative changes of prior cholecystectomy are again seen.  Pneumobilia is present. There is persistent panbiliary dilatation.  Focal low-density lesions within the liver remain stable, likely cysts.  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 There is chronic left hydronephrosis. The spleen is unremarkable. Multifocal bowel surgical changes are redemonstrated. The bowel caliber is unchanged. The aorta demonstrates preserved caliber despite advanced atherosclerotic calcifications. High-grade iliac narrowing is suggested.  No free fluid, free air or adenopathy is present. The bones are osteoporotic. There is new subchondral sclerosis within the left sacral isael, suggesting a subacute to chronic sacral insufficiency fracture.  IMPRESSION: Right lower lobe nodular consolidation suggests acute pneumonia or other pneumonitis. Recommend immediate follow-up radiographs to set a baseline for further follow-up.  Age-indeterminate, acute to subacute left sacral ala osteoporotic pathologic fracture.     Plan: repeat cbc in the morning    Pyuria, acute, POA    Assessments: asymptomatic per d/w pt; 3/8/2018 UA: 25-50 WBC's,  RBC's, moderate bacteria; 3/8/2018 Urine cx: negative to date    Plan: monitor urine cx    Hypomagnesemia, acute, POA    Assessment: mild    Plan: cont mag repl protocol    COPD (chronic obstructive pulmonary disease), chronic    Assessment: not oxygen or steroid dependent    Hypertension, chronic    Assessment: acutely controlled;     Plan: cont usual lisinopril 2.5 mg po daily and toprol 25 mg po daily; routine vitals            # Pain Assessment:   Current Pain Score 3/10/2018 3/10/2018 3/10/2018   Patient currently in pain? - - -   Pain score (0-10) 6 6 7   Pain location - - -   Pain descriptors - - -   - Jaida is experiencing pain due to pelvic fx. Pain management was discussed and the plan was created in a collaborative fashion.  Jaida's response to the  current recommendations: engaged  - see above for plan        DVT Prophylaxis: Pneumatic Compression Devices    Code Status: Full Code    Timbo Sandhu MD    Interval History    Feeling better today.  Thinks the neb treatments are helping and helps her cough up the phlegm.  [place] colored.  Went for a walk with therapy today which increase her pain on the left of her pelvis.  Would like to go home tomorrow so she would like to walk more with the nurses today.  Pain meds get the pain down to 4 to 5 /10 for a few hours.  Has not had a bm yet but took something for it a little while ago.  Does not feel constipated.    ROS:  Gen: no chills or sweats or shakes  Pulm: no sob with walking; no wheezing  Card: no palp or pounding; no chest pain or pressure or tightness  GI: no heartburn or nausea or stomach pain; passing flatus  M/S: no new pain anywhere    -Data reviewed today: I reviewed all new lab results over the last 24 hours.       Physical Exam   Temp: 96.2  F (35.7  C) Temp src: Tympanic BP: 118/53 Pulse: 85   Resp: 18 SpO2: 94 % O2 Device: None (Room air)    Vitals:    03/08/18 1404 03/09/18 0645 03/10/18 0440   Weight: 48.6 kg (107 lb 2.3 oz) 49 kg (108 lb 0.4 oz) 50.3 kg (110 lb 14.3 oz)     Vital Signs with Ranges  Temp:  [96.2  F (35.7  C)-97.7  F (36.5  C)] 96.2  F (35.7  C)  Pulse:  [78-85] 85  Resp:  [18-20] 18  BP: (118-124)/(53-76) 118/53  SpO2:  [94 %-95 %] 94 %  I/O last 3 completed shifts:  In: 1530 [P.O.:885; I.V.:645]  Out: 425 [Urine:425]    Constitutional: Comfortable, pleasant  Respiratory: CTA bilaterally; no rhonchi or crackles or wheezes  Cardiovascular: reg rate and rhythm; normal S1 and S2, no S3 or S4, no murmur; no peripheral edema in L/E's or U/E's  GI: flat; normal bs, no bruits; soft and no tenderness with palp in all quadrants  Skin/Integumen: no bruising over the buttocks and lower abd and thighs    Medications       acetaminophen  650 mg Oral 4x Daily     albuterol  2.5 mg Nebulization 4x  Daily     venlafaxine  75 mg Oral At Bedtime     aspirin EC  81 mg Oral Daily     atorvastatin  20 mg Oral Daily     fluticasone-salmeterol  1 puff Inhalation Q12H     lisinopril  2.5 mg Oral At Bedtime     metoprolol succinate  25 mg Oral Daily     venlafaxine  150 mg Oral Daily with breakfast     levofloxacin  750 mg Oral Daily     pantoprazole  40 mg Oral QAM AC     sodium chloride (PF)  5 mL Intravenous Q12H       Data   Results for orders placed or performed during the hospital encounter of 03/08/18   CT Abdomen Pelvis w Contrast    Narrative    PROCEDURE:  CT ABDOMEN PELVIS W CONTRAST    HISTORY: Abd pain,  recent fall, groin pain, vomiting no oral  contrast;     TECHNIQUE:  Helical CT of the abdomen and pelvis was performed  following injection of intravenous contrast.  Ingested oral contrast  partially opacifies the bowel.      COMPARISON:  5/9/2017    MEDS/CONTRAST: omnipaque 350 100 ml    FINDINGS:      Limited images through the lung bases demonstrate new nodular and  tree-in-bud consolidation at the right lung base. No pericardial or  pleural effusions are seen.    Postoperative changes of prior cholecystectomy are again seen.  Pneumobilia is present. There is persistent panbiliary dilatation.  Focal low-density lesions within the liver remain stable, likely  cysts.    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 There is chronic left  hydronephrosis. The spleen is unremarkable. Multifocal bowel surgical  changes are redemonstrated. The bowel caliber is unchanged. The aorta  demonstrates preserved caliber despite advanced atherosclerotic  calcifications. High-grade iliac narrowing is suggested.    No free fluid, free air or adenopathy is present. The bones are  osteoporotic. There is new subchondral sclerosis within the left  sacral isael, suggesting a subacute to chronic sacral insufficiency  fracture.      Impression    IMPRESSION:      Right lower lobe nodular consolidation suggests acute pneumonia or  other  pneumonitis. Recommend immediate follow-up radiographs to set a  baseline for further follow-up.    Age-indeterminate, acute to subacute left sacral ala osteoporotic  pathologic fracture.    SOUMYA EDGAR MD   X-ray Chest 2 vws*    Narrative    PROCEDURE:  XR CHEST 2 VW    HISTORY: pneumonia seen on CT abd; , .    COMPARISON:  CT 3/8/18    FINDINGS:  The cardiomediastinal contours are stable.  The trachea is midline.   There is calcific aortic atherosclerosis.  The known nodular opacities at the right lung base are faintly  appreciated but much less well seen when compared to the recent CT. No  effusion or pneumothorax is seen.  No suspicious osseous lesion or subdiaphragmatic free air.      Impression    IMPRESSION:      Fairly poor visualization of the known micronodular infiltrate at the  right lung base. Given the presence of a dominant nodule within that  area, a follow-up CT chest in 6 weeks after appropriate clinical  therapy should be considered.      SOUMYA EDGAR MD   Lipase   Result Value Ref Range    Lipase 8 (L) 11 - 82 U/L   *UA reflex to Microscopic   Result Value Ref Range    Color Urine Brown     Appearance Urine Clear     Glucose Urine Negative NEG^Negative mg/dL    Bilirubin Urine Small (A) NEG^Negative    Ketones Urine Negative NEG^Negative mg/dL    Specific Gravity Urine 1.020 1.003 - 1.035    Blood Urine Large (A) NEG^Negative    pH Urine 6.5 5.0 - 7.0 pH    Protein Albumin Urine >299 (A) NEG^Negative mg/dL    Urobilinogen Urine 1.0 0.2 - 1.0 EU/dL    Nitrite Urine Positive (A) NEG^Negative    Leukocyte Esterase Urine Moderate (A) NEG^Negative    Source Midstream Urine    Comprehensive metabolic panel   Result Value Ref Range    Sodium 138 134 - 144 mmol/L    Potassium 4.2 3.5 - 5.1 mmol/L    Chloride 100 98 - 107 mmol/L    Carbon Dioxide 30 21 - 31 mmol/L    Anion Gap 8 3 - 14 mmol/L    Glucose 129 (H) 70 - 105 mg/dL    Urea Nitrogen 18 7 - 25 mg/dL    Creatinine 0.74 0.60 - 1.20  mg/dL    GFR Estimate 78 >60 mL/min/1.7m2    GFR Estimate If Black >90 >60 mL/min/1.7m2    Calcium 9.7 8.6 - 10.3 mg/dL    Bilirubin Total 0.5 0.3 - 1.0 mg/dL    Albumin 3.6 3.5 - 5.7 g/dL    Protein Total 7.2 6.4 - 8.9 g/dL    Alkaline Phosphatase 86 34 - 104 U/L    ALT 10 7 - 52 U/L    AST 12 (L) 13 - 39 U/L   CBC with platelets differential   Result Value Ref Range    WBC 11.7 (H) 4.0 - 11.0 10e9/L    RBC Count 3.93 3.8 - 5.2 10e12/L    Hemoglobin 11.4 (L) 11.7 - 15.7 g/dL    Hematocrit 35.2 35.0 - 47.0 %    MCV 90 78 - 100 fl    MCH 29.0 26.5 - 33.0 pg    MCHC 32.4 31.5 - 36.5 g/dL    RDW 13.2 10.0 - 15.0 %    Platelet Count 341 150 - 450 10e9/L    Diff Method Automated Method     % Neutrophils 77.6 %    % Lymphocytes 12.8 %    % Monocytes 8.5 %    % Eosinophils 0.2 %    % Basophils 0.6 %    % Immature Granulocytes 0.3 %    Absolute Neutrophil 9.0 (H) 1.6 - 8.3 10e9/L    Absolute Lymphocytes 1.5 0.8 - 5.3 10e9/L    Absolute Monocytes 1.0 0.0 - 1.3 10e9/L    Absolute Eosinophils 0.0 0.0 - 0.7 10e9/L    Absolute Basophils 0.1 0.0 - 0.2 10e9/L    Abs Immature Granulocytes 0.0 0 - 0.4 10e9/L   Urine Microscopic   Result Value Ref Range    WBC Urine 25-50 (A) OTO5^0 - 5 /HPF    RBC Urine  (A) OTO2^O - 2 /HPF    Squamous Epithelial /LPF Urine Few FEW^Few /LPF    Bacteria Urine Moderate (A) NEG^Negative /HPF   Basic metabolic panel   Result Value Ref Range    Sodium 139 134 - 144 mmol/L    Potassium 3.9 3.5 - 5.1 mmol/L    Chloride 102 98 - 107 mmol/L    Carbon Dioxide 29 21 - 31 mmol/L    Anion Gap 8 3 - 14 mmol/L    Glucose 107 (H) 70 - 105 mg/dL    Urea Nitrogen 16 7 - 25 mg/dL    Creatinine 0.78 0.60 - 1.20 mg/dL    GFR Estimate 73 >60 mL/min/1.7m2    GFR Estimate If Black 88 >60 mL/min/1.7m2    Calcium 9.2 8.6 - 10.3 mg/dL   CBC with platelets   Result Value Ref Range    WBC 10.1 4.0 - 11.0 10e9/L    RBC Count 3.47 (L) 3.8 - 5.2 10e12/L    Hemoglobin 9.9 (L) 11.7 - 15.7 g/dL    Hematocrit 31.7 (L) 35.0 -  47.0 %    MCV 91 78 - 100 fl    MCH 28.5 26.5 - 33.0 pg    MCHC 31.2 (L) 31.5 - 36.5 g/dL    RDW 13.3 10.0 - 15.0 %    Platelet Count 293 150 - 450 10e9/L   Magnesium   Result Value Ref Range    Magnesium 1.6 (L) 1.9 - 2.7 mg/dL   Potassium   Result Value Ref Range    Potassium 4.1 3.5 - 5.1 mmol/L   Magnesium   Result Value Ref Range    Magnesium 1.5 (L) 1.9 - 2.7 mg/dL   Magnesium   Result Value Ref Range    Magnesium 2.5 1.9 - 2.7 mg/dL   Influenza A and B and RSV PCR   Result Value Ref Range    Specimen Description Nasopharyngeal     Influenza A PCR Negative NEG^Negative    Influenza B PCR Negative NEG^Negative    Resp Syncytial Virus Negative NEG^Negative   Gram stain   Result Value Ref Range    Specimen Description Sputum     Gram Stain <10 Squamous epithelial cells/low power field     Gram Stain Many  WBC'S seen       Gram Stain (A)      Few  Gram positive cocci  Rare  Gram positive rods

## 2018-03-10 NOTE — PLAN OF CARE
Problem: Patient Care Overview  Goal: Plan of Care/Patient Progress Review  Discharge Planner OT   Patient plan for discharge: return home with PCA services  Current status: requires set up and SBA  Barriers to return to prior living situation:  none  Recommendations for discharge: home with PCA services  Rationale for recommendations: pt nearing baseline level of ability       Entered by: Kristen Gross 03/10/2018 11:26 AM

## 2018-03-10 NOTE — PLAN OF CARE
Problem: Patient Care Overview  Goal: Plan of Care/Patient Progress Review  Discharge Planner PT   Patient plan for discharge: Pt to return home with assist from PCA  Current status: Pt SBA for bed mobility and CGA for gait x125 feet with FWW  Barriers to return to prior living situation: safe mobility with appropriate AD  Recommendations for discharge: possible home care  Rationale for recommendations: decreased mobility and impaired gait       Entered by: Ramya Boyce 03/10/2018 11:07 AM

## 2018-03-11 ENCOUNTER — APPOINTMENT (OUTPATIENT)
Dept: PHYSICAL THERAPY | Facility: OTHER | Age: 73
DRG: 194 | End: 2018-03-11
Payer: COMMERCIAL

## 2018-03-11 ENCOUNTER — APPOINTMENT (OUTPATIENT)
Dept: OCCUPATIONAL THERAPY | Facility: OTHER | Age: 73
DRG: 194 | End: 2018-03-11
Payer: COMMERCIAL

## 2018-03-11 PROBLEM — B96.20 E. COLI UTI (URINARY TRACT INFECTION): Status: ACTIVE | Noted: 2018-03-08

## 2018-03-11 LAB
ERYTHROCYTE [DISTWIDTH] IN BLOOD BY AUTOMATED COUNT: 13.2 % (ref 10–15)
HCT VFR BLD AUTO: 31.1 % (ref 35–47)
HGB BLD-MCNC: 9.9 G/DL (ref 11.7–15.7)
MAGNESIUM SERPL-MCNC: 2.1 MG/DL (ref 1.9–2.7)
MCH RBC QN AUTO: 28.5 PG (ref 26.5–33)
MCHC RBC AUTO-ENTMCNC: 31.8 G/DL (ref 31.5–36.5)
MCV RBC AUTO: 90 FL (ref 78–100)
PLATELET # BLD AUTO: 276 10E9/L (ref 150–450)
RBC # BLD AUTO: 3.47 10E12/L (ref 3.8–5.2)
WBC # BLD AUTO: 8.2 10E9/L (ref 4–11)

## 2018-03-11 PROCEDURE — 94640 AIRWAY INHALATION TREATMENT: CPT | Mod: 76

## 2018-03-11 PROCEDURE — 25000132 ZZH RX MED GY IP 250 OP 250 PS 637: Performed by: INTERNAL MEDICINE

## 2018-03-11 PROCEDURE — 40000275 ZZH STATISTIC RCP TIME EA 10 MIN

## 2018-03-11 PROCEDURE — 36415 COLL VENOUS BLD VENIPUNCTURE: CPT | Performed by: INTERNAL MEDICINE

## 2018-03-11 PROCEDURE — 25000125 ZZHC RX 250: Performed by: INTERNAL MEDICINE

## 2018-03-11 PROCEDURE — 99232 SBSQ HOSP IP/OBS MODERATE 35: CPT | Performed by: INTERNAL MEDICINE

## 2018-03-11 PROCEDURE — 40000133 ZZH STATISTIC OT WARD VISIT: Performed by: OCCUPATIONAL THERAPIST

## 2018-03-11 PROCEDURE — 85027 COMPLETE CBC AUTOMATED: CPT | Performed by: INTERNAL MEDICINE

## 2018-03-11 PROCEDURE — 97116 GAIT TRAINING THERAPY: CPT | Mod: GP

## 2018-03-11 PROCEDURE — 12000000 ZZH R&B MED SURG/OB

## 2018-03-11 PROCEDURE — 97535 SELF CARE MNGMENT TRAINING: CPT | Mod: GO | Performed by: OCCUPATIONAL THERAPIST

## 2018-03-11 PROCEDURE — 83735 ASSAY OF MAGNESIUM: CPT | Performed by: INTERNAL MEDICINE

## 2018-03-11 PROCEDURE — 97530 THERAPEUTIC ACTIVITIES: CPT | Mod: GP

## 2018-03-11 PROCEDURE — 40000193 ZZH STATISTIC PT WARD VISIT

## 2018-03-11 RX ORDER — POLYETHYLENE GLYCOL 3350 17 G/17G
17 POWDER, FOR SOLUTION ORAL DAILY
Status: DISCONTINUED | OUTPATIENT
Start: 2018-03-11 | End: 2018-03-12 | Stop reason: HOSPADM

## 2018-03-11 RX ORDER — BISACODYL 10 MG
10 SUPPOSITORY, RECTAL RECTAL DAILY
Status: DISCONTINUED | OUTPATIENT
Start: 2018-03-11 | End: 2018-03-12 | Stop reason: HOSPADM

## 2018-03-11 RX ORDER — AMOXICILLIN 250 MG
2 CAPSULE ORAL 2 TIMES DAILY
Status: DISCONTINUED | OUTPATIENT
Start: 2018-03-11 | End: 2018-03-12 | Stop reason: HOSPADM

## 2018-03-11 RX ADMIN — VENLAFAXINE HYDROCHLORIDE 150 MG: 75 CAPSULE, EXTENDED RELEASE ORAL at 08:05

## 2018-03-11 RX ADMIN — ACETAMINOPHEN 650 MG: 325 TABLET, FILM COATED ORAL at 17:51

## 2018-03-11 RX ADMIN — ACETAMINOPHEN 650 MG: 325 TABLET, FILM COATED ORAL at 12:46

## 2018-03-11 RX ADMIN — ALBUTEROL SULFATE 2.5 MG: 2.5 SOLUTION RESPIRATORY (INHALATION) at 07:10

## 2018-03-11 RX ADMIN — FLUTICASONE PROPIONATE AND SALMETEROL 1 PUFF: 50; 250 POWDER RESPIRATORY (INHALATION) at 07:10

## 2018-03-11 RX ADMIN — OXYCODONE HYDROCHLORIDE 5 MG: 5 TABLET ORAL at 06:26

## 2018-03-11 RX ADMIN — DOCUSATE SODIUM AND SENNOSIDES 2 TABLET: 8.6; 5 TABLET, FILM COATED ORAL at 06:26

## 2018-03-11 RX ADMIN — ACETAMINOPHEN 650 MG: 325 TABLET, FILM COATED ORAL at 21:11

## 2018-03-11 RX ADMIN — ALBUTEROL SULFATE 2.5 MG: 2.5 SOLUTION RESPIRATORY (INHALATION) at 18:27

## 2018-03-11 RX ADMIN — ALBUTEROL SULFATE 2.5 MG: 2.5 SOLUTION RESPIRATORY (INHALATION) at 11:01

## 2018-03-11 RX ADMIN — CLONAZEPAM 0.5 MG: 0.5 TABLET ORAL at 13:53

## 2018-03-11 RX ADMIN — METOPROLOL SUCCINATE 25 MG: 25 TABLET, EXTENDED RELEASE ORAL at 09:44

## 2018-03-11 RX ADMIN — ACETAMINOPHEN 650 MG: 325 TABLET, FILM COATED ORAL at 08:05

## 2018-03-11 RX ADMIN — OXYCODONE HYDROCHLORIDE 5 MG: 5 TABLET ORAL at 12:47

## 2018-03-11 RX ADMIN — LEVOFLOXACIN 750 MG: 750 TABLET, FILM COATED ORAL at 09:43

## 2018-03-11 RX ADMIN — ATORVASTATIN CALCIUM 20 MG: 20 TABLET, FILM COATED ORAL at 09:43

## 2018-03-11 RX ADMIN — DOCUSATE SODIUM AND SENNOSIDES 2 TABLET: 8.6; 5 TABLET, FILM COATED ORAL at 21:11

## 2018-03-11 RX ADMIN — ALBUTEROL SULFATE 2.5 MG: 2.5 SOLUTION RESPIRATORY (INHALATION) at 14:35

## 2018-03-11 RX ADMIN — BISACODYL 10 MG: 10 SUPPOSITORY RECTAL at 09:44

## 2018-03-11 RX ADMIN — PANTOPRAZOLE SODIUM 40 MG: 40 TABLET, DELAYED RELEASE ORAL at 08:05

## 2018-03-11 RX ADMIN — POLYETHYLENE GLYCOL 3350 17 G: 17 POWDER, FOR SOLUTION ORAL at 09:43

## 2018-03-11 RX ADMIN — FLUTICASONE PROPIONATE AND SALMETEROL 1 PUFF: 50; 250 POWDER RESPIRATORY (INHALATION) at 18:35

## 2018-03-11 RX ADMIN — ASPIRIN 81 MG: 81 TABLET, COATED ORAL at 09:44

## 2018-03-11 RX ADMIN — OXYCODONE HYDROCHLORIDE 5 MG: 5 TABLET ORAL at 21:11

## 2018-03-11 RX ADMIN — MAGNESIUM HYDROXIDE 30 ML: 400 SUSPENSION ORAL at 16:00

## 2018-03-11 RX ADMIN — VENLAFAXINE HYDROCHLORIDE 75 MG: 75 CAPSULE, EXTENDED RELEASE ORAL at 21:11

## 2018-03-11 NOTE — PROGRESS NOTES
[facility]    Hospitalist Progress Note    Interim notes rev'd    Assessment & Plan   Jaida Nieto is a 72 year old female who was admitted on 3/8/2018.    Principal Problem:  Community acquired pneumonia of right lung, acute, POA    Assessment: no hypoxia, T max 48 hour 97.7.  Initial WBC count 11.7, then 10.1.  3/8/2018 influenza PCR negative.  3/8/2018 CXR showed: nodular opacities at the right lung base are faintly appreciated but much less well seen when compared to the recent CT. No effusion or pneumothorax is seen.  CT Abd/pelv showed: Limited images through the lung bases demonstrate new nodular and tree-in-bud consolidation at the right lung base. No pericardial or pleural effusions are seen. 3/8/2018 sputum cx negative so far.  Improving    Plan: cont levaquin 750 mg po daily, day 4; cont advair bid; cont alb neb qid and q 2 hours prn; RCAT today    COPD exacerbation, acute, POA    Assessment: possible; appears mild; stable    E. coli UTI (urinary tract infection), acute, POA    Assessments: asymptomatic per d/w pt; 3/8/2018 UA: 25-50 WBC's,  RBC's, moderate bacteria; 3/8/2018 Urine cx: negative to date    Plan: monitor urine cx    Constipation, acute, not POA    Assessment: no recent bm; pt has a hx of constipation and signif bowel issues (H&P)    Plan: start senna s 2 tabs po bid; start miralax 17 gm po daily; dulcolax supp; MOM prn; will consider an AXR in the morning if no results or sx's worsen    Pelvic pain, acute, POA    Assessment: not controlled; pt has used oxycodone 2 times in the past 24 hours; has used toradol twice in the past 24 hours and oxycodone 3 times in the past 24 hours    Plan: cont tylenol 650 mg po qid; cont toradol 15 mg q 6 hours prn and oxycodone 5 mg po q 4 hours prn pain; cont PT and OT assessments; ambulate in gil today    Closed fracture of sacrum, acute, POA    Assessment: 3/8/2018 CT Abd/pelv showed: new subchondral sclerosis within the left  sacral isael, suggesting a subacute to chronic sacral insufficiency fracture.    Plan: see above    Fall at home, acute, POA    Assessment: per pt occurred at home a week ago    Plan: cont PT and OT    Anemia, acute, POA    Assessment: hgb was 12.3 on 5/9/2017; no known blood loss per pt.  3/8/2018 CT ABDOMEN PELVIS W CONTRAST: COMPARISON:  5/9/2017  FINDINGS:  Limited images through the lung bases demonstrate new nodular and tree-in-bud consolidation at the right lung base. No pericardial or pleural effusions are seen.  Postoperative changes of prior cholecystectomy are again seen.  Pneumobilia is present. There is persistent panbiliary dilatation.  Focal low-density lesions within the liver remain stable, likely cysts.  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 There is chronic left hydronephrosis. The spleen is unremarkable. Multifocal bowel surgical changes are redemonstrated. The bowel caliber is unchanged. The aorta demonstrates preserved caliber despite advanced atherosclerotic calcifications. High-grade iliac narrowing is suggested.  No free fluid, free air or adenopathy is present. The bones are osteoporotic. There is new subchondral sclerosis within the left sacral isael, suggesting a subacute to chronic sacral insufficiency fracture.  IMPRESSION: Right lower lobe nodular consolidation suggests acute pneumonia or other pneumonitis. Recommend immediate follow-up radiographs to set a baseline for further follow-up.  Age-indeterminate, acute to subacute left sacral ala osteoporotic pathologic fracture.     Plan: repeat cbc in the morning    Hypomagnesemia, acute, POA    Assessment: mild; resolved    COPD (chronic obstructive pulmonary disease), chronic    Assessment: not oxygen or steroid dependent    Hypertension, chronic    Assessment: acutely controlled;     Plan: cont usual lisinopril 2.5 mg po daily and toprol 25 mg po daily; routine vitals            # Pain Assessment:   Current Pain Score 3/11/2018 3/11/2018  3/10/2018   Patient currently in pain? - - yes   Pain score (0-10) 5 8 10   Pain location - - Abdomen   Pain descriptors - - Sharp   - Jaida is experiencing pain due to pelvic fx. Pain management was discussed and the plan was created in a collaborative fashion.  Jaida's response to the current recommendations: engaged  - see above for pain plan and bowel plan        DVT Prophylaxis: Pneumatic Compression Devices    Code Status: Full Code    Timbo Sandhu MD    Interval History    Has still not had a bm but has been passing flatus.  No her stomach feels hard when she touches it.  New pain on the left side of her stomach.  She does not want to go through constipation so she is will to try anything.  She has had bowel obstruction in the past.  Has been able to drink coffee which typically helps.  Still is able to drink fluids and eat.      ROS:  Gen: no chills or sweats or shakes  Pulm: no sob with walking; no wheezing or coughing  GI: no heartburn or nausea or stomach pain; passing flatus  : no difficulty urinating; no pain or burning or blood with urination  M/S: no new pain anywhere in her legs or butt    -Data reviewed today: I reviewed all new lab results over the last 24 hours.       Physical Exam   Temp: 96.6  F (35.9  C) Temp src: Oral BP: 104/57 Pulse: 73   Resp: 18 SpO2: 96 % O2 Device: None (Room air)    Vitals:    03/09/18 0645 03/10/18 0440 03/11/18 0419   Weight: 49 kg (108 lb 0.4 oz) 50.3 kg (110 lb 14.3 oz) 51 kg (112 lb 7 oz)     Vital Signs with Ranges  Temp:  [96.2  F (35.7  C)-96.6  F (35.9  C)] 96.6  F (35.9  C)  Pulse:  [73-81] 73  Resp:  [18] 18  BP: ()/(52-61) 104/57  SpO2:  [94 %-96 %] 96 %  I/O last 3 completed shifts:  In: 675 [P.O.:675]  Out: 400 [Urine:400]    Constitutional: Comfortable, sitting in chair  Respiratory: CTA bilaterally; no rhonchi or crackles or wheezes  Cardiovascular: reg rate and rhythm; normal S1 and S2, no S3 or S4, no murmur; no peripheral edema in L/E's or  U/E's  GI: flat; normal bs, no bruits; firm and mildly tender with palp in all quadrants but L>R    Medications       bisacodyl  10 mg Rectal Daily     senna-docusate  2 tablet Oral BID     polyethylene glycol  17 g Oral Daily     acetaminophen  650 mg Oral 4x Daily     albuterol  2.5 mg Nebulization 4x Daily     venlafaxine  75 mg Oral At Bedtime     aspirin EC  81 mg Oral Daily     atorvastatin  20 mg Oral Daily     fluticasone-salmeterol  1 puff Inhalation Q12H     lisinopril  2.5 mg Oral At Bedtime     metoprolol succinate  25 mg Oral Daily     venlafaxine  150 mg Oral Daily with breakfast     levofloxacin  750 mg Oral Daily     pantoprazole  40 mg Oral QAM AC     sodium chloride (PF)  5 mL Intravenous Q12H       Data   Results for orders placed or performed during the hospital encounter of 03/08/18   CT Abdomen Pelvis w Contrast    Narrative    PROCEDURE:  CT ABDOMEN PELVIS W CONTRAST    HISTORY: Abd pain,  recent fall, groin pain, vomiting no oral  contrast;     TECHNIQUE:  Helical CT of the abdomen and pelvis was performed  following injection of intravenous contrast.  Ingested oral contrast  partially opacifies the bowel.      COMPARISON:  5/9/2017    MEDS/CONTRAST: omnipaque 350 100 ml    FINDINGS:      Limited images through the lung bases demonstrate new nodular and  tree-in-bud consolidation at the right lung base. No pericardial or  pleural effusions are seen.    Postoperative changes of prior cholecystectomy are again seen.  Pneumobilia is present. There is persistent panbiliary dilatation.  Focal low-density lesions within the liver remain stable, likely  cysts.    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 There is chronic left  hydronephrosis. The spleen is unremarkable. Multifocal bowel surgical  changes are redemonstrated. The bowel caliber is unchanged. The aorta  demonstrates preserved caliber despite advanced atherosclerotic  calcifications. High-grade iliac narrowing is suggested.    No free fluid,  free air or adenopathy is present. The bones are  osteoporotic. There is new subchondral sclerosis within the left  sacral isael, suggesting a subacute to chronic sacral insufficiency  fracture.      Impression    IMPRESSION:      Right lower lobe nodular consolidation suggests acute pneumonia or  other pneumonitis. Recommend immediate follow-up radiographs to set a  baseline for further follow-up.    Age-indeterminate, acute to subacute left sacral ala osteoporotic  pathologic fracture.    SOUMYA EDGAR MD   X-ray Chest 2 vws*    Narrative    PROCEDURE:  XR CHEST 2 VW    HISTORY: pneumonia seen on CT abd; , .    COMPARISON:  CT 3/8/18    FINDINGS:  The cardiomediastinal contours are stable.  The trachea is midline.   There is calcific aortic atherosclerosis.  The known nodular opacities at the right lung base are faintly  appreciated but much less well seen when compared to the recent CT. No  effusion or pneumothorax is seen.  No suspicious osseous lesion or subdiaphragmatic free air.      Impression    IMPRESSION:      Fairly poor visualization of the known micronodular infiltrate at the  right lung base. Given the presence of a dominant nodule within that  area, a follow-up CT chest in 6 weeks after appropriate clinical  therapy should be considered.      SOUMYA EDGAR MD   Lipase   Result Value Ref Range    Lipase 8 (L) 11 - 82 U/L   *UA reflex to Microscopic   Result Value Ref Range    Color Urine Brown     Appearance Urine Clear     Glucose Urine Negative NEG^Negative mg/dL    Bilirubin Urine Small (A) NEG^Negative    Ketones Urine Negative NEG^Negative mg/dL    Specific Gravity Urine 1.020 1.003 - 1.035    Blood Urine Large (A) NEG^Negative    pH Urine 6.5 5.0 - 7.0 pH    Protein Albumin Urine >299 (A) NEG^Negative mg/dL    Urobilinogen Urine 1.0 0.2 - 1.0 EU/dL    Nitrite Urine Positive (A) NEG^Negative    Leukocyte Esterase Urine Moderate (A) NEG^Negative    Source Midstream Urine     Comprehensive metabolic panel   Result Value Ref Range    Sodium 138 134 - 144 mmol/L    Potassium 4.2 3.5 - 5.1 mmol/L    Chloride 100 98 - 107 mmol/L    Carbon Dioxide 30 21 - 31 mmol/L    Anion Gap 8 3 - 14 mmol/L    Glucose 129 (H) 70 - 105 mg/dL    Urea Nitrogen 18 7 - 25 mg/dL    Creatinine 0.74 0.60 - 1.20 mg/dL    GFR Estimate 78 >60 mL/min/1.7m2    GFR Estimate If Black >90 >60 mL/min/1.7m2    Calcium 9.7 8.6 - 10.3 mg/dL    Bilirubin Total 0.5 0.3 - 1.0 mg/dL    Albumin 3.6 3.5 - 5.7 g/dL    Protein Total 7.2 6.4 - 8.9 g/dL    Alkaline Phosphatase 86 34 - 104 U/L    ALT 10 7 - 52 U/L    AST 12 (L) 13 - 39 U/L   CBC with platelets differential   Result Value Ref Range    WBC 11.7 (H) 4.0 - 11.0 10e9/L    RBC Count 3.93 3.8 - 5.2 10e12/L    Hemoglobin 11.4 (L) 11.7 - 15.7 g/dL    Hematocrit 35.2 35.0 - 47.0 %    MCV 90 78 - 100 fl    MCH 29.0 26.5 - 33.0 pg    MCHC 32.4 31.5 - 36.5 g/dL    RDW 13.2 10.0 - 15.0 %    Platelet Count 341 150 - 450 10e9/L    Diff Method Automated Method     % Neutrophils 77.6 %    % Lymphocytes 12.8 %    % Monocytes 8.5 %    % Eosinophils 0.2 %    % Basophils 0.6 %    % Immature Granulocytes 0.3 %    Absolute Neutrophil 9.0 (H) 1.6 - 8.3 10e9/L    Absolute Lymphocytes 1.5 0.8 - 5.3 10e9/L    Absolute Monocytes 1.0 0.0 - 1.3 10e9/L    Absolute Eosinophils 0.0 0.0 - 0.7 10e9/L    Absolute Basophils 0.1 0.0 - 0.2 10e9/L    Abs Immature Granulocytes 0.0 0 - 0.4 10e9/L   Urine Microscopic   Result Value Ref Range    WBC Urine 25-50 (A) OTO5^0 - 5 /HPF    RBC Urine  (A) OTO2^O - 2 /HPF    Squamous Epithelial /LPF Urine Few FEW^Few /LPF    Bacteria Urine Moderate (A) NEG^Negative /HPF   Basic metabolic panel   Result Value Ref Range    Sodium 139 134 - 144 mmol/L    Potassium 3.9 3.5 - 5.1 mmol/L    Chloride 102 98 - 107 mmol/L    Carbon Dioxide 29 21 - 31 mmol/L    Anion Gap 8 3 - 14 mmol/L    Glucose 107 (H) 70 - 105 mg/dL    Urea Nitrogen 16 7 - 25 mg/dL    Creatinine 0.78  0.60 - 1.20 mg/dL    GFR Estimate 73 >60 mL/min/1.7m2    GFR Estimate If Black 88 >60 mL/min/1.7m2    Calcium 9.2 8.6 - 10.3 mg/dL   CBC with platelets   Result Value Ref Range    WBC 10.1 4.0 - 11.0 10e9/L    RBC Count 3.47 (L) 3.8 - 5.2 10e12/L    Hemoglobin 9.9 (L) 11.7 - 15.7 g/dL    Hematocrit 31.7 (L) 35.0 - 47.0 %    MCV 91 78 - 100 fl    MCH 28.5 26.5 - 33.0 pg    MCHC 31.2 (L) 31.5 - 36.5 g/dL    RDW 13.3 10.0 - 15.0 %    Platelet Count 293 150 - 450 10e9/L   Magnesium   Result Value Ref Range    Magnesium 1.6 (L) 1.9 - 2.7 mg/dL   Potassium   Result Value Ref Range    Potassium 4.1 3.5 - 5.1 mmol/L   Magnesium   Result Value Ref Range    Magnesium 1.5 (L) 1.9 - 2.7 mg/dL   Magnesium   Result Value Ref Range    Magnesium 2.5 1.9 - 2.7 mg/dL   CBC with platelets   Result Value Ref Range    WBC 8.2 4.0 - 11.0 10e9/L    RBC Count 3.47 (L) 3.8 - 5.2 10e12/L    Hemoglobin 9.9 (L) 11.7 - 15.7 g/dL    Hematocrit 31.1 (L) 35.0 - 47.0 %    MCV 90 78 - 100 fl    MCH 28.5 26.5 - 33.0 pg    MCHC 31.8 31.5 - 36.5 g/dL    RDW 13.2 10.0 - 15.0 %    Platelet Count 276 150 - 450 10e9/L   Magnesium   Result Value Ref Range    Magnesium 2.1 1.9 - 2.7 mg/dL   Influenza A and B and RSV PCR   Result Value Ref Range    Specimen Description Nasopharyngeal     Influenza A PCR Negative NEG^Negative    Influenza B PCR Negative NEG^Negative    Resp Syncytial Virus Negative NEG^Negative   Sputum Culture Aerobic Bacterial   Result Value Ref Range    Specimen Description Sputum     Culture Micro       Normal respiratory nanci  No further identification or sensitivity done     Gram stain   Result Value Ref Range    Specimen Description Sputum     Gram Stain <10 Squamous epithelial cells/low power field     Gram Stain Many  WBC'S seen       Gram Stain (A)      Few  Gram positive cocci  Rare  Gram positive rods     Urine Culture Aerobic Bacterial   Result Value Ref Range    Specimen Description Unspecified Urine     Culture Micro 50,000 to  100,000 colonies/mL  Escherichia coli   (A)        Susceptibility    Escherichia coli - PUSHPA     AMPICILLIN <=8 Sensitive ug/mL     AZTREONAM <=8 Sensitive ug/mL     CEFEPIME <=8 Sensitive ug/mL     CEFTRIAXONE <=8 Sensitive ug/mL     CEFTAZIDIME <=1 Sensitive ug/mL     CEFOTAXIME <=2 Sensitive ug/mL     CIPROFLOXACIN <=1 Sensitive ug/mL     CEFUROXIME <=4 Sensitive ug/mL     NITROFURANTOIN <=32 Sensitive ug/mL     GENTAMICIN <=4 Sensitive ug/mL     IMIPENEM <=1 Sensitive ug/mL     LEVOFLOXACIN <=2 Sensitive ug/mL     Piperacillin/Tazo <=16 Sensitive ug/mL     Trimethoprim/Sulfa <=2/38 Sensitive ug/mL     TETRACYCLINE <=4 Sensitive ug/mL

## 2018-03-11 NOTE — PLAN OF CARE
Problem: Patient Care Overview  Goal: Plan of Care/Patient Progress Review  Patient C/O constipation and abdomen distend, firm, bowel sound active in all quadrants. Scheduled Murelax, dulcolax suppository, and senna given this AM. Patient had very tiny BM and is passing gas. At 1600 M.O.M given with prune juice. Abdomen is not as distended and softer. No BM yet but patient is more comfortable. Patient is ambulating halls with SBA and uses a walker. Will continue to monitor. Rose Braun RN 3/11/2018 5:18 PM

## 2018-03-11 NOTE — PROGRESS NOTES
Patient awoke with sudden LLQ abdominal pain that is sharp and rates 10/10. She reports she hasn't had this pain any time recently. She does have an extensive history of abdominal issues including colostomy x2, cholecystectomy, appendectomy, laparotomy exploratory with lysis of adhesions, and other abdominal surgeries with mesh placement. Patient reports last BM was Wednesday and that she hasn't been going very much since she mainly drinks boost for meals. She is not passing flatus but bowel sounds are active in all four quadrants but a little hypoactive in LLQ and LUQ. Abdomen is slightly distended and tender in LLQ. Abdomen is tympanic to percussion in all quadrants. Patient denies any other changes such as nausea. Administered scheduled tylenol, prn oxycodone, prn senna x2, and prn Maalox. Warm pack to abdomen. Call placed to on call MD, Dr. Lomax, and update on patient status given, new order for dulcolax supp. Patient refusing suppository as she states the pain is better and would like to wait until AM and see if it is needed. Asked the patient again later on and she again said she does not want the suppository. Patient is sleeping at the moment.    Leisa Valverde RN .................................. March 11, 2018 12:36 AM

## 2018-03-11 NOTE — PROGRESS NOTES
"Patient reports LLQ abdominal pain is a little better this AM but still present. Bowel sounds active, abdomen distended. Denies flatus. Administered prn oxycodone and warm pack. Administered 2 prn senna as patient is still refusing to try suppository. Agrees to this AM if no results. Offered prune juice. She stated \"I'll have coffee maybe that will help and I will try the juice later.\"    Leisa Valverde RN .................................. March 11, 2018 6:56 AM      "

## 2018-03-11 NOTE — PLAN OF CARE
Problem: Patient Care Overview  Goal: Plan of Care/Patient Progress Review  Outcome: No Change  No change since initial assessment at  0940. Rose Braun RN 3/11/2018 2:03 PM

## 2018-03-11 NOTE — PLAN OF CARE
Problem: Patient Care Overview  Goal: Plan of Care/Patient Progress Review  Discharge Planner OT   Patient plan for discharge: Return home with previous PCA services for shopping, housekeeping, laundry  Current status: supervision only for showering and mobility  Barriers to return to prior living situation: none  Recommendations for discharge: home with PCA services  Rationale for recommendations: pt near or at baseline level of function       Entered by: Kirsten Gross 03/11/2018 9:05 AM

## 2018-03-11 NOTE — PLAN OF CARE
Problem: Patient Care Overview  Goal: Plan of Care/Patient Progress Review  Discharge Planner PT   Patient plan for discharge: return home with assist from PCA  Current status: Pt SBA for transfers and CGA for gait with cues for walking pattern.  Barriers to return to prior living situation: impaired gait and transfers  Recommendations for discharge: continue with FWW for all mobility  Rationale for recommendations: weakness, pain, impaired gait       Entered by: Ramya Boyce 03/11/2018 8:50 AM

## 2018-03-12 ENCOUNTER — APPOINTMENT (OUTPATIENT)
Dept: OCCUPATIONAL THERAPY | Facility: OTHER | Age: 73
DRG: 194 | End: 2018-03-12
Payer: COMMERCIAL

## 2018-03-12 ENCOUNTER — APPOINTMENT (OUTPATIENT)
Dept: PHYSICAL THERAPY | Facility: OTHER | Age: 73
DRG: 194 | End: 2018-03-12
Payer: COMMERCIAL

## 2018-03-12 VITALS
RESPIRATION RATE: 20 BRPM | WEIGHT: 112.43 LBS | SYSTOLIC BLOOD PRESSURE: 117 MMHG | TEMPERATURE: 96.5 F | OXYGEN SATURATION: 98 % | BODY MASS INDEX: 17.04 KG/M2 | HEIGHT: 68 IN | DIASTOLIC BLOOD PRESSURE: 76 MMHG | HEART RATE: 82 BPM

## 2018-03-12 PROCEDURE — 25000132 ZZH RX MED GY IP 250 OP 250 PS 637: Performed by: INTERNAL MEDICINE

## 2018-03-12 PROCEDURE — 94640 AIRWAY INHALATION TREATMENT: CPT

## 2018-03-12 PROCEDURE — 25000125 ZZHC RX 250: Performed by: INTERNAL MEDICINE

## 2018-03-12 PROCEDURE — 40000193 ZZH STATISTIC PT WARD VISIT: Performed by: PHYSICAL THERAPIST

## 2018-03-12 PROCEDURE — 99239 HOSP IP/OBS DSCHRG MGMT >30: CPT | Performed by: INTERNAL MEDICINE

## 2018-03-12 PROCEDURE — 40000275 ZZH STATISTIC RCP TIME EA 10 MIN

## 2018-03-12 PROCEDURE — 97116 GAIT TRAINING THERAPY: CPT | Mod: GP | Performed by: PHYSICAL THERAPIST

## 2018-03-12 RX ORDER — OXYCODONE HYDROCHLORIDE 5 MG/1
5 TABLET ORAL EVERY 4 HOURS PRN
Qty: 30 TABLET | Refills: 0 | Status: SHIPPED | OUTPATIENT
Start: 2018-03-12 | End: 2018-10-11

## 2018-03-12 RX ORDER — ACETAMINOPHEN 325 MG/1
650 TABLET ORAL 4 TIMES DAILY
Qty: 100 TABLET | Status: ON HOLD | COMMUNITY
Start: 2018-03-12 | End: 2021-10-27

## 2018-03-12 RX ORDER — LEVOFLOXACIN 750 MG/1
750 TABLET, FILM COATED ORAL DAILY
Qty: 3 TABLET | Refills: 0 | Status: SHIPPED | OUTPATIENT
Start: 2018-03-12 | End: 2018-03-15

## 2018-03-12 RX ADMIN — PANTOPRAZOLE SODIUM 40 MG: 40 TABLET, DELAYED RELEASE ORAL at 07:51

## 2018-03-12 RX ADMIN — POLYETHYLENE GLYCOL 3350 17 G: 17 POWDER, FOR SOLUTION ORAL at 09:32

## 2018-03-12 RX ADMIN — ACETAMINOPHEN 650 MG: 325 TABLET, FILM COATED ORAL at 07:52

## 2018-03-12 RX ADMIN — METOPROLOL SUCCINATE 25 MG: 25 TABLET, EXTENDED RELEASE ORAL at 09:31

## 2018-03-12 RX ADMIN — VENLAFAXINE HYDROCHLORIDE 150 MG: 75 CAPSULE, EXTENDED RELEASE ORAL at 07:51

## 2018-03-12 RX ADMIN — ATORVASTATIN CALCIUM 20 MG: 20 TABLET, FILM COATED ORAL at 09:32

## 2018-03-12 RX ADMIN — DOCUSATE SODIUM AND SENNOSIDES 2 TABLET: 8.6; 5 TABLET, FILM COATED ORAL at 09:32

## 2018-03-12 RX ADMIN — ALBUTEROL SULFATE 2.5 MG: 2.5 SOLUTION RESPIRATORY (INHALATION) at 07:21

## 2018-03-12 RX ADMIN — ASPIRIN 81 MG: 81 TABLET, COATED ORAL at 09:31

## 2018-03-12 RX ADMIN — FLUTICASONE PROPIONATE AND SALMETEROL 1 PUFF: 50; 250 POWDER RESPIRATORY (INHALATION) at 07:21

## 2018-03-12 RX ADMIN — LEVOFLOXACIN 750 MG: 750 TABLET, FILM COATED ORAL at 09:32

## 2018-03-12 RX ADMIN — OXYCODONE HYDROCHLORIDE 5 MG: 5 TABLET ORAL at 05:03

## 2018-03-12 NOTE — DISCHARGE SUMMARY
"[facility]    Discharge Summary  Hospitalist    Date of Admission:  3/8/2018  Date of Discharge:  3/12/2018  Discharging Provider: Valdemar Perez  Date of Service (when I saw the patient): 03/12/18    Discharge Diagnoses   Principal Problem:    Community acquired pneumonia of right lung (3/8/2018)  Active Problems:    COPD (chronic obstructive pulmonary disease) (H) (8/5/2015)    Hypertension (8/30/2013)    E. coli UTI (urinary tract infection) (3/8/2018)    Closed fracture of sacrum (H) (3/8/2018)    Fall at home (3/8/2018)    Anemia (3/9/2018)    Hypomagnesemia (3/9/2018)      History of Present Illness   Jaida Nieto is an 72 year old female who presented with shortness of breath, weakness, and pelvic pain.  Per the H&P:  \"Jaida Nieto is a 72 year old female who presents with various symptoms that have worsened over the past week.  She was standing on her bed hanging curtains when she fell off the bed landing on her buttocks.  She had pain afterwards but is been ambulatory despite this pain.  This happened 2 weeks ago, but over the past week she has noticed dysuria and polyuria.  She has no urinary incontinence.  She also has been more short of breath a productive cough.  She denies fevers or chills.     In the emergency department a CT of the abdomen and pelvis shows a sacral fracture.  There was also pneumonia seen in the right lower lung.  Urinalysis shows bacturia and 25-50 white blood cells.  She was admitted with community acquired pneumonia, urinary tract infection, and a new sacral fracture.\"    Hospital Course   Jaida Nieto was admitted on 3/8/2018.  The following problems were addressed during her hospitalization:    Committee acquired pneumonia  She was started on levofloxacin empirically on admission.  She tolerated this and improved during her hospital stay.  She will finish 3 additional days of levofloxacin on discharge.    E. coli UTI  Her urine culture grew out a " pansensitive E. coli.  She will finish out a course of levofloxacin which the E. coli is susceptible to.    Sacral fracture.  Nondisplaced.  Her pain control is good.  She is ambulatory.  She was prescribed a 4 wheel walker for discharge.    COPD  Patient has a history of COPD.  She had mild symptoms during her stay.    # Discharge Pain Plan:   - During her hospitalization, Jaida experienced pain due to sacral fracture.  The pain plan for discharge was discussed with Jaida and the plan was created in a collaborative fashion.    - Opioids prescribed on discharge: oxycodone  - Duration of opioids after discharge: 30 tablets  - Bowel regimen: senna, miralax and millk of magnesia    Valdemar Perez MD      Code Status   Full Code       Primary Care Physician   Osiel Calderon    Physical Exam   Temp: 96.5  F (35.8  C) Temp src: Tympanic BP: 117/76 Pulse: 82   Resp: 20 SpO2: 98 % O2 Device: None (Room air)    Vitals:    03/10/18 0440 03/11/18 0419 03/12/18 0457   Weight: 50.3 kg (110 lb 14.3 oz) 51 kg (112 lb 7 oz) 51 kg (112 lb 7 oz)     Vital Signs with Ranges  Temp:  [96.5  F (35.8  C)-97.7  F (36.5  C)] 96.5  F (35.8  C)  Pulse:  [73-82] 82  Resp:  [18-24] 20  BP: ()/(51-76) 117/76  SpO2:  [93 %-98 %] 98 %  I/O last 3 completed shifts:  In: 340 [P.O.:340]  Out: 1050 [Urine:1050]    GENERAL: Comfortable, no apparent distress.  CARDIOVASCULAR: regular rate and rhythm, no murmur. No lower extremity edema   RESPIRATORY: Clear to auscultation bilaterally, no wheezes or crackles.  GI: non-tender, non-distended, normal bowel sounds.   SKIN: warm periphery, no rashes      Discharge Disposition   Discharged to home  Condition at discharge: Stable    Consultations This Hospital Stay   PHYSICAL THERAPY ADULT IP CONSULT  OCCUPATIONAL THERAPY ADULT IP CONSULT  NUTRITION SERVICES ADULT IP CONSULT    Time Spent on this Encounter   Valdemar MATHEWS, personally saw the patient today and spent greater than 30 minutes discharging  this patient.    Discharge Orders     Reason for your hospital stay   Pneumonia, sacral fracture, urinary tract infection     Follow-up and recommended labs and tests   Follow up with Monalisa Smith on 3/14 at 10 AM.     Activity   Your activity upon discharge: activity as tolerated     Full Code     Diet   Follow this diet upon discharge: Orders Placed This Encounter     Snacks/Supplements Adult: Ensure Clear; Between Meals     Combination Diet Regular Diet Adult         Discharge Medications   Current Discharge Medication List      START taking these medications    Details   levofloxacin (LEVAQUIN) 750 MG tablet Take 1 tablet (750 mg) by mouth daily for 3 days  Qty: 3 tablet, Refills: 0    Associated Diagnoses: Pneumonia of right lung due to infectious organism, unspecified part of lung; E. coli UTI (urinary tract infection)      oxyCODONE IR (ROXICODONE) 5 MG tablet Take 1 tablet (5 mg) by mouth every 4 hours as needed for other or moderate to severe pain (pain control or improvement in physical function. Hold dose for analgesic side effects.)  Qty: 30 tablet, Refills: 0    Associated Diagnoses: Closed nondisplaced zone III fracture of sacrum, initial encounter (H)      acetaminophen (TYLENOL) 325 MG tablet Take 2 tablets (650 mg) by mouth 4 times daily  Qty: 100 tablet    Associated Diagnoses: Closed nondisplaced zone III fracture of sacrum, initial encounter (H)      !! order for DME Equipment being ordered: Walker 4 wheels  Treatment Diagnosis: sacral fracture  PETE: 6 months  Qty: 1 Units, Refills: 0    Associated Diagnoses: Closed nondisplaced zone III fracture of sacrum, initial encounter (H)       !! - Potential duplicate medications found. Please discuss with provider.      CONTINUE these medications which have NOT CHANGED    Details   albuterol (2.5 MG/3ML) 0.083% neb solution Inhale 1 vial into the lungs every 4 hours as needed       budesonide-formoterol (SYMBICORT) 160-4.5 MCG/ACT Inhaler Inhale 2 puffs  into the lungs daily       albuterol (VENTOLIN HFA) 108 (90 BASE) MCG/ACT Inhaler Inhale 1-2 puffs into the lungs every 4 hours as needed       aspirin EC 81 MG EC tablet Take 81 mg by mouth daily       atorvastatin (LIPITOR) 20 MG tablet Take 20 mg by mouth At Bedtime       cholecalciferol (D 2000) 2000 UNITS tablet Take 1 tablet by mouth daily       clonazePAM (KLONOPIN) 0.5 MG tablet Take 0.5 mg by mouth daily . May taken an additional tablet at bedtime as needed      !! Respiratory Therapy Supplies (NEBULIZER COMPRESSOR) KIT Nebulizer machine and mask/tubing      !! order for [facility] bed for recent abdominal surgery      lisinopril (PRINIVIL/ZESTRIL) 5 MG tablet Take 2.5 mg by mouth daily      metoprolol succinate (TOPROL-XL) 25 MG 24 hr tablet Take 25 mg by mouth daily      !! Respiratory Therapy Supplies (NEBULIZER COMPRESSOR) KIT Nebulizer, neb kit, neb cup and mask.  Medication: Albuterol  For home use. Length of need  for Medicare patients: 99      omeprazole (PRILOSEC) 20 MG CR capsule Take 20 mg by mouth daily       !! venlafaxine (EFFEXOR-XR) 150 MG 24 hr capsule Take 150 mg by mouth daily       !! venlafaxine (EFFEXOR-XR) 75 MG 24 hr capsule Take 75 mg by mouth At Bedtime        !! - Potential duplicate medications found. Please discuss with provider.      STOP taking these medications       fluticasone-salmeterol (ADVAIR DISKUS) 250-50 MCG/DOSE diskus inhaler Comments:   Reason for Stopping:             Allergies   Allergies   Allergen Reactions     Codeine Other (See Comments) and Nausea and Vomiting     dizzy     Gabapentin Other (See Comments) and Unknown     Felt like being plugged into light socket after taking 1 pill  Lethargy and sweaty     Nortriptyline      Other reaction(s): Emotional Disturbance  Made her feel funny     Sucralfate Nausea     Data   Most Recent 3 CBC's:  Recent Labs   Lab Test  03/11/18   0530  03/09/18   0428  03/08/18   1055   WBC  8.2  10.1  11.7*   HGB  9.9*  9.9*   11.4*   MCV  90  91  90   PLT  276  293  341      Most Recent 3 BMP's:  Recent Labs   Lab Test  03/10/18   0541  03/09/18   0428  03/08/18   1055  05/09/17   1022   NA   --   139  138  133   POTASSIUM  4.1  3.9  4.2  3.7   CHLORIDE   --   102  100  101   CO2   --   29  30  25   BUN   --   16  18  31*   CR   --   0.78  0.74  0.83   ANIONGAP   --   8  8   --    DARRIUS   --   9.2  9.7  9.4   GLC   --   107*  129*  171*     Most Recent 2 LFT's:  Recent Labs   Lab Test  03/08/18   1055  05/09/17   1022   AST  12*   --    ALT  10   --    ALKPHOS  86  65   BILITOTAL  0.5  0.6     Most Recent INR's and Anticoagulation Dosing History:  Anticoagulation Dose History     There is no flowsheet data to display.        Most Recent 3 Troponin's:No lab results found.  Most Recent Cholesterol Panel:  Recent Labs   Lab Test  10/22/14   0835   LDL  82   HDL  58   TRIG  112     Most Recent 6 Bacteria Isolates From Any Culture (See EPIC Reports for Culture Details):  Recent Labs   Lab Test  03/08/18   1956  03/08/18   1142   CULT  Normal respiratory nanci  No further identification or sensitivity done    50,000 to 100,000 colonies/mL  Escherichia coli  *     Most Recent TSH, T4 and A1c Labs:No lab results found.  Results for orders placed or performed during the hospital encounter of 03/08/18   CT Abdomen Pelvis w Contrast    Narrative    PROCEDURE:  CT ABDOMEN PELVIS W CONTRAST    HISTORY: Abd pain,  recent fall, groin pain, vomiting no oral  contrast;     TECHNIQUE:  Helical CT of the abdomen and pelvis was performed  following injection of intravenous contrast.  Ingested oral contrast  partially opacifies the bowel.      COMPARISON:  5/9/2017    MEDS/CONTRAST: omnipaque 350 100 ml    FINDINGS:      Limited images through the lung bases demonstrate new nodular and  tree-in-bud consolidation at the right lung base. No pericardial or  pleural effusions are seen.    Postoperative changes of prior cholecystectomy are again seen.  Pneumobilia  is present. There is persistent panbiliary dilatation.  Focal low-density lesions within the liver remain stable, likely  cysts.    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 There is chronic left  hydronephrosis. The spleen is unremarkable. Multifocal bowel surgical  changes are redemonstrated. The bowel caliber is unchanged. The aorta  demonstrates preserved caliber despite advanced atherosclerotic  calcifications. High-grade iliac narrowing is suggested.    No free fluid, free air or adenopathy is present. The bones are  osteoporotic. There is new subchondral sclerosis within the left  sacral isael, suggesting a subacute to chronic sacral insufficiency  fracture.      Impression    IMPRESSION:      Right lower lobe nodular consolidation suggests acute pneumonia or  other pneumonitis. Recommend immediate follow-up radiographs to set a  baseline for further follow-up.    Age-indeterminate, acute to subacute left sacral ala osteoporotic  pathologic fracture.    SOUMYA EDGAR MD   X-ray Chest 2 vws*    Narrative    PROCEDURE:  XR CHEST 2 VW    HISTORY: pneumonia seen on CT abd; , .    COMPARISON:  CT 3/8/18    FINDINGS:  The cardiomediastinal contours are stable.  The trachea is midline.   There is calcific aortic atherosclerosis.  The known nodular opacities at the right lung base are faintly  appreciated but much less well seen when compared to the recent CT. No  effusion or pneumothorax is seen.  No suspicious osseous lesion or subdiaphragmatic free air.      Impression    IMPRESSION:      Fairly poor visualization of the known micronodular infiltrate at the  right lung base. Given the presence of a dominant nodule within that  area, a follow-up CT chest in 6 weeks after appropriate clinical  therapy should be considered.      SOUMYA EDGAR MD

## 2018-03-12 NOTE — PROGRESS NOTES
[facility] BERNAL DISCHARGE NOTE    Patient discharged to home at 10:45 AM via wheel chair. Accompanied by other:pca and staff. Discharge instructions reviewed with patient, opportunity offered to ask questions. Prescriptions sent to patients preferred pharmacy, also written script for narcotics and walker. All belongings sent with patient.    Maribell Ruiz

## 2018-03-12 NOTE — PLAN OF CARE
"Problem: Patient Care Overview  Goal: Plan of Care/Patient Progress Review  Outcome: No Change    Last vitals: /67  Pulse 73  Temp 97.1  F  (Tympanic)  Resp 18  Wt 51 kg (112 lb 7 oz)  SpO2 94% room air     Held PM lisinopril because BP was 90s/50s. Patient is on scheduled metoprolol as well. Patient reports sharp abdominal pain in LLQ rating 8/10. Her abdomen is still slightly distended but improved and much softer than yesterday. Bowel sounds are hypoactive in all quadrants. She reports tenderness to palpation in LLQ only. She reports a lot of flatus and had one small liquid brown stool during night. She denies nausea. She reports the prn oxycodone and warm packs help. She did not want any more bowel meds this shift, other than the scheduled senna. She did wake up at 0610 yelling \"help\" and crying. She realized he was having a bad dream and calmed down once awake. LOCx4.     Leisa Valverde RN .................................. March 12, 2018 6:24 AM              "

## 2018-03-12 NOTE — PHARMACY - DISCHARGE MEDICATION RECONCILIATION AND EDUCATION
Pharmacy:  Discharge Counseling and Medication Reconciliation    Jaida Nieto  [address]  [facility] [address]  [phone] (home)   72 year old female  PCP: Osiel Calderon    Allergies: Codeine; Gabapentin; Nortriptyline; and Sucralfate    Discharge Counseling:    Pharmacist met with patient (and/or family) today to review the medication portion of the After Visit Summary (with an emphasis on NEW medications) and to address patient's questions/concerns.    Summary of Education: Provided education on Levaquin, Tylenol, and Oxycodone. Also discussed using OTC Miralax and Senna for constipation with oxycodone use.    Materials Provided:  MedCounselor sheets printed from Clinical Pharmacology on:     Discharge Medication Reconciliation:    Donta Shi RP has reviewed the patient's discharge medication orders and has compared them to the inpatient medication administration record and to what the patient was taking prior to admission - any discrepancies have been resolved.    It has been determined that the patient has an adequate supply of medications available or which can be obtained from the patient's preferred pharmacy, which HE/SHE has confirmed as:  [facility]    Thank you for the consult.    Donta Shi RPH........March 12, 2018 10:03 AM

## 2018-03-12 NOTE — PLAN OF CARE
Problem: Patient Care Overview  Goal: Plan of Care/Patient Progress Review  Discharge Planner PT   Patient plan for discharge: D/C home today, with PCA  Current status: walking fair with FWW, understands safety issues.  Barriers to return to prior living situation: needs to get a 4WW for gravel road.  Recommendations for discharge: 4WW.  Rationale for recommendations: [place] road [place] gravel, and stated she will be taking her dog for a walk. She will not be safe with only a FWW.       Entered by: Kory Quijano 03/12/2018 10:37 AM

## 2018-06-08 RX ORDER — BUDESONIDE AND FORMOTEROL FUMARATE DIHYDRATE 160; 4.5 UG/1; UG/1
AEROSOL RESPIRATORY (INHALATION)
Qty: 10.2 G | Refills: 0 | OUTPATIENT
Start: 2018-06-08

## 2018-06-08 NOTE — TELEPHONE ENCOUNTER
Patient's PCP is Osiel Calderon at [facility] [place]. Refill request will be refused at this time with note to pharmacy to forward this request to him.    Unable to complete prescription refill per RN Medication Refill Policy. Mishel Garber RN .............. 6/8/2018  4:19 PM

## 2018-07-23 NOTE — PROGRESS NOTES
Patient Information     Patient Name  Jaida Nieto MRN  [id number] Sex  Female   1945      Letter by Rom Escoto MD at      Author:  Rom Escoto MD Service:  (none) Author Type:  (none)    Filed:   Encounter Date:  3/1/2017 Status:  (Other)           Jaida Nieto  [address] [facility] [address]          2017    Dear Ms. Nieto:    A LIMITED refill of DULERA 200-5 mcg/actuation inhaler has been called into your pharmacy.    Additional refills require a medication management and annual lab appointment with Rom Escoto MD. Please call the clinic at [phone] to schedule your appointment.    Thank you,    The Refill Nurse  [facility]

## 2018-07-24 NOTE — PROGRESS NOTES
Patient Information     Patient Name  Jaida Nieto MRN  [id number] Sex  Female   1945      Letter by Rom Escoto MD at      Author:  Rom Escoto MD Service:  (none) Author Type:  (none)    Filed:   Encounter Date:  3/28/2017 Status:  (Other)           Jaida Nieto  [address] [facility] [address]          2017    Dear Ms. Nieto:    This letter is to remind you that you are due for your annual exam with Rom Escoto MD . Your last comprehensive medication visit was more than 12 months ago.     A LIMITED refill of ADVAIR DISKUS 250-50 mcg/dose diskus inhaler has been called into your pharmacy.    Additional refills require an annual medication management appointment with Rom Escoto MD. Please call the clinic at [phone] to schedule your appointment.    Thank you,    The Refill Nurse  [facility]

## 2018-10-11 ENCOUNTER — OFFICE VISIT (OUTPATIENT)
Dept: FAMILY MEDICINE | Facility: OTHER | Age: 73
End: 2018-10-11
Payer: COMMERCIAL

## 2018-10-11 VITALS
DIASTOLIC BLOOD PRESSURE: 68 MMHG | HEART RATE: 97 BPM | BODY MASS INDEX: 18.27 KG/M2 | TEMPERATURE: 97.2 F | OXYGEN SATURATION: 96 % | WEIGHT: 116.4 LBS | SYSTOLIC BLOOD PRESSURE: 112 MMHG | HEIGHT: 67 IN

## 2018-10-11 DIAGNOSIS — Z87.891 PERSONAL HISTORY OF TOBACCO USE, PRESENTING HAZARDS TO HEALTH: ICD-10-CM

## 2018-10-11 DIAGNOSIS — J20.9 ACUTE BRONCHITIS, UNSPECIFIED ORGANISM: Primary | ICD-10-CM

## 2018-10-11 PROCEDURE — G0463 HOSPITAL OUTPT CLINIC VISIT: HCPCS

## 2018-10-11 PROCEDURE — 99213 OFFICE O/P EST LOW 20 MIN: CPT | Performed by: NURSE PRACTITIONER

## 2018-10-11 RX ORDER — PREDNISONE 20 MG/1
40 TABLET ORAL DAILY
Qty: 10 TABLET | Refills: 0 | Status: SHIPPED | OUTPATIENT
Start: 2018-10-11 | End: 2018-10-16

## 2018-10-11 RX ORDER — GUAIFENESIN 600 MG/1
600 TABLET, EXTENDED RELEASE ORAL 2 TIMES DAILY PRN
Qty: 20 TABLET | Refills: 0 | Status: SHIPPED | OUTPATIENT
Start: 2018-10-11 | End: 2019-12-27

## 2018-10-11 RX ORDER — AZITHROMYCIN 250 MG/1
TABLET, FILM COATED ORAL
Qty: 6 TABLET | Refills: 0 | Status: SHIPPED | OUTPATIENT
Start: 2018-10-11 | End: 2019-12-27

## 2018-10-11 ASSESSMENT — PAIN SCALES - GENERAL: PAINLEVEL: WORST PAIN (10)

## 2018-10-11 NOTE — PROGRESS NOTES
HPI:    Jaida Nieto is a 73 year old female  who presents to clinic today for cough.    Productive cough, wheezing, chest tightness, shortness of breath for the past 2 days.  Coughing up dark colored phlegm.  No documented fevers.  Alternating hot and cold spells.  Sore throat x 2 days.  No runny or stuffy nose.  Body aches.  No headaches.  Appetite decreased, minimal.  Energy decreased.  States history of bronchitis and pneumonia in the past.  Uses Symbicort inhaler BID.  Has an albuterol nebulizer and inhaler but hasn't used.   Current daily smoker 0.5 ppd.  Tried OTC cold medication.      Past Medical History:   Diagnosis Date     Abdominal pain, chronic, right upper quadrant 11/30/2015     Acute biliary pancreatitis 10/5/2015     CAD (coronary artery disease) 01/2007    mild CAD 9-06; Stress CM, initial EF 15%.     Cardiomyopathy in disease classified elsewhere (H) 09/2006    Stress CM/T[place]o initial EF15%. 9-06 F/U EF 55%.     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     Other specified congenital malformations of kidney (CODE) 06/28/2011     S/P repair of recurrent ventral hernia 3/11/2014     Squamous cell carcinoma of skin of left upper limb, including shoulder 11/2015     Type 2 diabetes mellitus without complications (H)     Pt states that she was borderline diabetic     Vitamin D deficiency 10/14/2013     Past Surgical History:   Procedure Laterality Date     ARTHROPLASTY HIP      left     ARTHROSCOPY KNEE      left knee surgical repair     CHOLECYSTECTOMY  09/03/2013    open     COLONOSCOPY  08/26/2013 8/26/13,F/U 2018      COLOSTOMY  06/04/2013    Sigmoid Colostomy/Brian's, removal mesh     COLOSTOMY  08/27/2013    Colostomy closure, appy 29 mm EEA     ELBOW SURGERY      left elbow repair surgical     HERNIA REPAIR      x 2 with mesh     LAPAROTOMY EXPLORATORY  09/03/2013    9/3/13,lysis of adhesions     OTHER SURGICAL HISTORY  03/10/2014    with mesh and bilateral component separation     Social History   Substance Use Topics     Smoking status: Current Every Day Smoker     Packs/day: 1.00     Years: 42.00     Types: Cigarettes     Smokeless tobacco: Never Used     Alcohol use No     Current Outpatient Prescriptions   Medication Sig Dispense Refill     acetaminophen (TYLENOL) 325 MG tablet Take 2 tablets (650 mg) by mouth 4 times daily 100 tablet      albuterol (2.5 MG/3ML) 0.083% neb solution Inhale 1 vial into the lungs every 4 hours as needed        albuterol (VENTOLIN HFA) 108 (90 BASE) MCG/ACT Inhaler Inhale 1-2 puffs into the lungs every 4 hours as needed        aspirin EC 81 MG EC tablet Take 81 mg by mouth daily        atorvastatin (LIPITOR) 20 MG tablet Take 20 mg by mouth At Bedtime        budesonide-formoterol (SYMBICORT) 160-4.5 MCG/ACT Inhaler Inhale 2 puffs into the lungs daily        cholecalciferol (D 2000) 2000 UNITS tablet Take 1 tablet by mouth daily        clonazePAM (KLONOPIN) 0.5 MG tablet Take 0.5 mg by mouth daily . May taken an additional tablet at bedtime as needed       lisinopril (PRINIVIL/ZESTRIL) 5 MG tablet Take 2.5 mg by mouth daily       metoprolol succinate (TOPROL-XL) 25 MG 24 hr tablet Take 25 mg by mouth daily       omeprazole (PRILOSEC) 20 MG CR capsule Take 20 mg by mouth daily        order for DME Equipment being ordered: Walker 4 wheels  Treatment Diagnosis: sacral fracture  PETE: 6 months 1 Units 0     order for DME Hospital bed for recent abdominal surgery       Respiratory Therapy Supplies (NEBULIZER COMPRESSOR) KIT Nebulizer machine and mask/tubing       Respiratory Therapy Supplies  "(NEBULIZER COMPRESSOR) KIT Nebulizer, neb kit, neb cup and mask.  Medication: Albuterol  For home use. Length of need  for Medicare patients: 99       venlafaxine (EFFEXOR-XR) 150 MG 24 hr capsule Take 150 mg by mouth daily        venlafaxine (EFFEXOR-XR) 75 MG 24 hr capsule Take 75 mg by mouth At Bedtime        Allergies   Allergen Reactions     Codeine Other (See Comments) and Nausea and Vomiting     dizzy     Gabapentin Other (See Comments) and Unknown     Felt like being plugged into light socket after taking 1 pill  Lethargy and sweaty     Nortriptyline      Other reaction(s): Emotional Disturbance  Made her feel funny     Sucralfate Nausea         Past medical history, past surgical history, current medications and allergies reviewed and accurate to the best of my knowledge.        ROS:  Refer to HPI    /68 (BP Location: Right arm, Patient Position: Sitting, Cuff Size: Adult Regular)  Pulse 97  Temp 97.2  F (36.2  C) (Tympanic)  Ht 5' 6.53\" (1.69 m)  Wt 116 lb 6.4 oz (52.8 kg)  SpO2 96%  Breastfeeding? No  BMI 18.49 kg/m2    EXAM:  General Appearance:  Miserable, mildly ill and fatigued appearing elderly female, non toxic appearance, appropriate appearance for age. No acute distress  Head: normocephalic, atraumatic  Ears: Left TM grey, translucent with bony landmarks appreciated, no erythema, no effusion, no bulging, no purulence.  Right TM grey, translucent with bony landmarks appreciated, no erythema, no effusion, no bulging, no purulence.  Left auditory canal clear.  Right auditory canal clear.  Normal external ears, non tender.  Eyes: conjunctivae normal without erythema or irritation, no drainage or crusting, no eyelid swelling, pupils equal   Orophayrnx: moist mucous membranes, posterior pharynx with erythema, tonsils without hypertrophy, no erythema, no exudates or petechiae, no post nasal drip seen.    Neck: supple without adenopathy  Respiratory: normal chest wall and respirations.  " Normal effort.  Mild congestion to auscultation bilaterally, no wheezing, crackles or rhonchi.  No increased work of breathing.  Deep course congested bronchial cough appreciated, oxygen saturation 96%  Cardiac: RRR with no murmurs  Musculoskeletal:  Normal gait.  Equal movement of bilateral upper extremities.  Equal movement of bilateral lower extremities.    Psychological: normal affect, alert and pleasant          ASSESSMENT/PLAN:  1. Acute bronchitis, unspecified organism    - azithromycin (ZITHROMAX) 250 MG tablet; Two tablets first day, then one tablet daily for four days.  Dispense: 6 tablet; Refill: 0    - predniSONE (DELTASONE) 20 MG tablet; Take 2 tablets (40 mg) by mouth daily for 5 days  Dispense: 10 tablet; Refill: 0  - guaiFENesin (MUCINEX) 600 MG 12 hr tablet; Take 1 tablet (600 mg) by mouth 2 times daily as needed for congestion  Dispense: 20 tablet; Refill: 0    Encouraged fluids  Symptomatic treatment - salt water gargles, honey, elevation, humidifier, lozenges, etc     Tylenol or ibuprofen PRN    Discussed warning signs/symptoms indicative of need to f/u    Follow up if symptoms persist or worsen or concerns      2. Personal history of tobacco use, presenting hazards to health    Encouraged smoking cessation.  Patient states she is actively trying to reduce her use.

## 2018-10-11 NOTE — PATIENT INSTRUCTIONS
Prednisone daily x 5 days - take with food    Azithromycin daily x 5 days - take with prednisone    Mucinex twice daily as needed - take with full glass of water    Follow up if worsening or concerns

## 2018-10-11 NOTE — MR AVS SNAPSHOT
"              After Visit Summary   10/11/2018    Jaida Nieto    MRN: [id number]           Patient Information     Date Of Birth          1945        Visit Information        Provider Department      10/11/2018 12:30 PM [facility] NURSE [facility]        Today's Diagnoses     Acute bronchitis, unspecified organism    -  1    Personal history of tobacco use, presenting hazards to health          Care Instructions    Prednisone daily x 5 days - take with food    Azithromycin daily x 5 days - take with prednisone    Mucinex twice daily as needed - take with full glass of water    Follow up if worsening or concerns          Follow-ups after your visit        Who to contact     If you have questions or need follow up information about today's clinic visit or your schedule please contact [facility] [place] directly at [phone].  Normal or non-critical lab and imaging results will be communicated to you by MyChart, letter or phone within 4 business days after the clinic has received the results. If you do not hear from us within 7 days, please contact the clinic through [employer] or phone. If you have a critical or abnormal lab result, we will notify you by phone as soon as possible.  Submit refill requests through [employer] or call your pharmacy and they will forward the refill request to us. Please allow 3 business days for your refill to be completed.          Additional Information About Your Visit        Care EveryWhere ID     This is your Care EveryWhere ID. This could be used by other organizations to access your [facility] medical records  [id number]        Your Vitals Were     Pulse Temperature Height Pulse Oximetry Breastfeeding? BMI (Body Mass Index)    97 97.2  F (36.2  C) (Tympanic) 5' 6.53\" (1.69 m) 96% No 18.49 kg/m2       Blood Pressure from Last 3 Encounters:   10/11/18 112/68   03/12/18 117/76   01/06/16 126/66    Weight from Last 3 Encounters:   10/11/18 116 lb 6.4 " oz (52.8 kg)   03/12/18 112 lb 7 oz (51 kg)   01/06/16 119 lb 9.6 oz (54.3 kg)              Today, you had the following     No orders found for display         Today's Medication Changes          These changes are accurate as of 10/11/18 12:55 PM.  If you have any questions, ask your nurse or doctor.               Start taking these medicines.        Dose/Directions    azithromycin 250 MG tablet   Commonly known as:  ZITHROMAX   Used for:  Acute bronchitis, unspecified organism        Two tablets first day, then one tablet daily for four days.   Quantity:  6 tablet   Refills:  0       guaiFENesin 600 MG 12 hr tablet   Commonly known as:  MUCINEX   Used for:  Acute bronchitis, unspecified organism        Dose:  600 mg   Take 1 tablet (600 mg) by mouth 2 times daily as needed for congestion   Quantity:  20 tablet   Refills:  0       predniSONE 20 MG tablet   Commonly known as:  DELTASONE   Used for:  Acute bronchitis, unspecified organism        Dose:  40 mg   Take 2 tablets (40 mg) by mouth daily for 5 days   Quantity:  10 tablet   Refills:  0         Stop taking these medicines if you haven't already. Please contact your care team if you have questions.     oxyCODONE IR 5 MG tablet   Commonly known as:  ROXICODONE                Where to get your medicines      These medications were sent to [id number] - [place], [place] - [address] [facility] [address]     Phone:  [phone]     azithromycin 250 MG tablet    guaiFENesin 600 MG 12 hr tablet    predniSONE 20 MG tablet                Primary Care Provider Office Phone # Fax #    Osiel Calderon [phone]       [facility] [address] [facility] [address]        Equal Access to Services     [facility] AH: Matt Miranda, dhara alegria, kinjal pierre. So [facility] [phone].    ATENCIÓN: Cameron puentes  español, tiene a chong disposición servicios gratuitos de asistencia lingüística. Brooks lopes [phone].    We comply with applicable federal civil rights laws and [place] laws. We do not discriminate on the basis of race, color, national origin, age, disability, sex, sexual orientation, or gender identity.            Thank you!     Thank you for choosing [facility]  for your care. Our goal is always to provide you with excellent care. Hearing back from our patients is one way we can continue to improve our services. Please take a few minutes to complete the written survey that you may receive in the mail after your visit with us. Thank you!             Your Updated Medication List - Protect others around you: Learn how to safely use, store and throw away your medicines at www.disposemymeds.org.          This list is accurate as of 10/11/18 12:55 PM.  Always use your most recent med list.                   Brand Name Dispense Instructions for use Diagnosis    acetaminophen 325 MG tablet    TYLENOL    100 tablet    Take 2 tablets (650 mg) by mouth 4 times daily    Closed nondisplaced zone III fracture of sacrum, initial encounter (H)       * albuterol (2.5 MG/3ML) 0.083% neb solution      Inhale 1 vial into the lungs every 4 hours as needed        * VENTOLIN  (90 Base) MCG/ACT inhaler   Generic drug:  albuterol      Inhale 1-2 puffs into the lungs every 4 hours as needed        aspirin 81 MG EC tablet      Take 81 mg by mouth daily        atorvastatin 20 MG tablet    LIPITOR     Take 20 mg by mouth At Bedtime        azithromycin 250 MG tablet    ZITHROMAX    6 tablet    Two tablets first day, then one tablet daily for four days.    Acute bronchitis, unspecified organism       clonazePAM 0.5 MG tablet    klonoPIN     Take 0.5 mg by mouth daily . May taken an additional tablet at bedtime as needed        D 2000 2000 units tablet   Generic drug:  cholecalciferol      Take 1 tablet by mouth  daily        guaiFENesin 600 MG 12 hr tablet    MUCINEX    20 tablet    Take 1 tablet (600 mg) by mouth 2 times daily as needed for congestion    Acute bronchitis, unspecified organism       lisinopril 5 MG tablet    PRINIVIL/ZESTRIL     Take 2.5 mg by mouth daily        metoprolol succinate 25 MG 24 hr tablet    TOPROL-XL     Take 25 mg by mouth daily        * Nebulizer Compressor Kit      Nebulizer, neb kit, neb cup and mask.  Medication: Albuterol  For home use. Length of need  for Medicare patients: 99        * Nebulizer Compressor Kit      Nebulizer machine and mask/tubing        omeprazole 20 MG CR capsule    priLOSEC     Take 20 mg by mouth daily        * order for DME      Hospital bed for recent abdominal surgery        * order for DME     1 Units    Equipment being ordered: Walker 4 wheels Treatment Diagnosis: sacral fracture PETE: 6 months    Closed nondisplaced zone III fracture of sacrum, initial encounter (H)       predniSONE 20 MG tablet    DELTASONE    10 tablet    Take 2 tablets (40 mg) by mouth daily for 5 days    Acute bronchitis, unspecified organism       SYMBICORT 160-4.5 MCG/ACT Inhaler   Generic drug:  budesonide-formoterol      Inhale 2 puffs into the lungs daily        * venlafaxine 150 MG 24 hr capsule    EFFEXOR-XR     Take 150 mg by mouth daily        * venlafaxine 75 MG 24 hr capsule    EFFEXOR-XR     Take 75 mg by mouth At Bedtime        * Notice:  This list has 8 medication(s) that are the same as other medications prescribed for you. Read the directions carefully, and ask your doctor or other care provider to review them with you.

## 2018-10-11 NOTE — NURSING NOTE
Patient presents with cough productive dark, wheezing for 2 days. Gypsy Salazar LPN .............10/11/2018  12:28 PM

## 2019-12-27 ENCOUNTER — HOSPITAL ENCOUNTER (EMERGENCY)
Facility: OTHER | Age: 74
Discharge: HOME | End: 2019-12-27
Attending: PHYSICIAN ASSISTANT | Admitting: PHYSICIAN ASSISTANT
Payer: COMMERCIAL

## 2019-12-27 ENCOUNTER — APPOINTMENT (OUTPATIENT)
Dept: CT IMAGING | Facility: OTHER | Age: 74
End: 2019-12-27
Attending: PHYSICIAN ASSISTANT
Payer: COMMERCIAL

## 2019-12-27 VITALS
HEIGHT: 66 IN | OXYGEN SATURATION: 92 % | DIASTOLIC BLOOD PRESSURE: 76 MMHG | RESPIRATION RATE: 18 BRPM | BODY MASS INDEX: 18.96 KG/M2 | TEMPERATURE: 97.7 F | HEART RATE: 101 BPM | WEIGHT: 118 LBS | SYSTOLIC BLOOD PRESSURE: 115 MMHG

## 2019-12-27 DIAGNOSIS — R05.9 COUGH: ICD-10-CM

## 2019-12-27 DIAGNOSIS — J18.9 PNEUMONIA: ICD-10-CM

## 2019-12-27 LAB
ALBUMIN SERPL-MCNC: 3.7 G/DL (ref 3.5–5.7)
ALBUMIN UR-MCNC: NEGATIVE MG/DL
ALP SERPL-CCNC: 79 U/L (ref 34–104)
ALT SERPL W P-5'-P-CCNC: 12 U/L (ref 7–52)
ANION GAP SERPL CALCULATED.3IONS-SCNC: 5 MMOL/L (ref 3–14)
APPEARANCE UR: CLEAR
AST SERPL W P-5'-P-CCNC: 15 U/L (ref 13–39)
BASOPHILS # BLD AUTO: 0.1 10E9/L (ref 0–0.2)
BASOPHILS NFR BLD AUTO: 0.5 %
BILIRUB SERPL-MCNC: 0.3 MG/DL (ref 0.3–1)
BILIRUB UR QL STRIP: NEGATIVE
BUN SERPL-MCNC: 18 MG/DL (ref 7–25)
CALCIUM SERPL-MCNC: 9.7 MG/DL (ref 8.6–10.3)
CHLORIDE SERPL-SCNC: 102 MMOL/L (ref 98–107)
CO2 SERPL-SCNC: 30 MMOL/L (ref 21–31)
COLOR UR AUTO: YELLOW
CREAT SERPL-MCNC: 0.72 MG/DL (ref 0.6–1.2)
DIFFERENTIAL METHOD BLD: ABNORMAL
EOSINOPHIL # BLD AUTO: 0.1 10E9/L (ref 0–0.7)
EOSINOPHIL NFR BLD AUTO: 1.1 %
ERYTHROCYTE [DISTWIDTH] IN BLOOD BY AUTOMATED COUNT: 14.4 % (ref 10–15)
GFR SERPL CREATININE-BSD FRML MDRD: 80 ML/MIN/{1.73_M2}
GLUCOSE SERPL-MCNC: 110 MG/DL (ref 70–105)
GLUCOSE UR STRIP-MCNC: NEGATIVE MG/DL
HCT VFR BLD AUTO: 36.7 % (ref 35–47)
HGB BLD-MCNC: 11 G/DL (ref 11.7–15.7)
HGB UR QL STRIP: ABNORMAL
IMM GRANULOCYTES # BLD: 0.2 10E9/L (ref 0–0.4)
IMM GRANULOCYTES NFR BLD: 1.4 %
KETONES UR STRIP-MCNC: NEGATIVE MG/DL
LACTATE SERPL-SCNC: 1.2 MMOL/L (ref 0.5–2.2)
LEUKOCYTE ESTERASE UR QL STRIP: NEGATIVE
LIPASE SERPL-CCNC: 23 U/L (ref 11–82)
LYMPHOCYTES # BLD AUTO: 2.4 10E9/L (ref 0.8–5.3)
LYMPHOCYTES NFR BLD AUTO: 19.6 %
MCH RBC QN AUTO: 27 PG (ref 26.5–33)
MCHC RBC AUTO-ENTMCNC: 30 G/DL (ref 31.5–36.5)
MCV RBC AUTO: 90 FL (ref 78–100)
MONOCYTES # BLD AUTO: 0.9 10E9/L (ref 0–1.3)
MONOCYTES NFR BLD AUTO: 7.3 %
NEUTROPHILS # BLD AUTO: 8.5 10E9/L (ref 1.6–8.3)
NEUTROPHILS NFR BLD AUTO: 70.1 %
NITRATE UR QL: NEGATIVE
PH UR STRIP: 5.5 PH (ref 5–9)
PLATELET # BLD AUTO: 364 10E9/L (ref 150–450)
POTASSIUM SERPL-SCNC: 5.2 MMOL/L (ref 3.5–5.1)
PROT SERPL-MCNC: 7.5 G/DL (ref 6.4–8.9)
RBC # BLD AUTO: 4.08 10E12/L (ref 3.8–5.2)
RBC #/AREA URNS AUTO: NORMAL /HPF
SODIUM SERPL-SCNC: 137 MMOL/L (ref 134–144)
SOURCE: ABNORMAL
SP GR UR STRIP: 1.02 (ref 1–1.03)
TRANS CELLS #/AREA URNS HPF: NORMAL /HPF
TROPONIN I SERPL-MCNC: 5.3 PG/ML
UROBILINOGEN UR STRIP-ACNC: 0.2 EU/DL (ref 0.2–1)
WBC # BLD AUTO: 12.1 10E9/L (ref 4–11)
WBC #/AREA URNS AUTO: NORMAL /HPF

## 2019-12-27 PROCEDURE — 93010 ELECTROCARDIOGRAM REPORT: CPT | Performed by: INTERNAL MEDICINE

## 2019-12-27 PROCEDURE — 83605 ASSAY OF LACTIC ACID: CPT | Performed by: PHYSICIAN ASSISTANT

## 2019-12-27 PROCEDURE — 85025 COMPLETE CBC W/AUTO DIFF WBC: CPT | Performed by: PHYSICIAN ASSISTANT

## 2019-12-27 PROCEDURE — 25500064 ZZH RX 255 OP 636: Performed by: PHYSICIAN ASSISTANT

## 2019-12-27 PROCEDURE — 36415 COLL VENOUS BLD VENIPUNCTURE: CPT | Performed by: PHYSICIAN ASSISTANT

## 2019-12-27 PROCEDURE — 84484 ASSAY OF TROPONIN QUANT: CPT | Performed by: PHYSICIAN ASSISTANT

## 2019-12-27 PROCEDURE — 25000128 H RX IP 250 OP 636: Performed by: PHYSICIAN ASSISTANT

## 2019-12-27 PROCEDURE — 81001 URINALYSIS AUTO W/SCOPE: CPT | Performed by: PHYSICIAN ASSISTANT

## 2019-12-27 PROCEDURE — 96374 THER/PROPH/DIAG INJ IV PUSH: CPT | Mod: XU | Performed by: PHYSICIAN ASSISTANT

## 2019-12-27 PROCEDURE — 99285 EMERGENCY DEPT VISIT HI MDM: CPT | Mod: 25 | Performed by: PHYSICIAN ASSISTANT

## 2019-12-27 PROCEDURE — 80053 COMPREHEN METABOLIC PANEL: CPT | Performed by: PHYSICIAN ASSISTANT

## 2019-12-27 PROCEDURE — 83690 ASSAY OF LIPASE: CPT | Performed by: PHYSICIAN ASSISTANT

## 2019-12-27 PROCEDURE — 93005 ELECTROCARDIOGRAM TRACING: CPT | Performed by: PHYSICIAN ASSISTANT

## 2019-12-27 PROCEDURE — 96375 TX/PRO/DX INJ NEW DRUG ADDON: CPT | Performed by: PHYSICIAN ASSISTANT

## 2019-12-27 PROCEDURE — 74177 CT ABD & PELVIS W/CONTRAST: CPT

## 2019-12-27 PROCEDURE — 99284 EMERGENCY DEPT VISIT MOD MDM: CPT | Mod: Z6 | Performed by: PHYSICIAN ASSISTANT

## 2019-12-27 RX ORDER — METHYLPREDNISOLONE SODIUM SUCCINATE 125 MG/2ML
125 INJECTION, POWDER, LYOPHILIZED, FOR SOLUTION INTRAMUSCULAR; INTRAVENOUS ONCE
Status: COMPLETED | OUTPATIENT
Start: 2019-12-27 | End: 2019-12-27

## 2019-12-27 RX ORDER — CODEINE PHOSPHATE AND GUAIFENESIN 10; 100 MG/5ML; MG/5ML
1-2 SOLUTION ORAL EVERY 4 HOURS PRN
Qty: 118 ML | Refills: 0 | Status: ON HOLD | OUTPATIENT
Start: 2019-12-27 | End: 2021-10-27

## 2019-12-27 RX ORDER — ALBUTEROL SULFATE 0.83 MG/ML
2.5 SOLUTION RESPIRATORY (INHALATION) EVERY 4 HOURS PRN
Qty: 1 BOX | Refills: 0 | Status: SHIPPED | OUTPATIENT
Start: 2019-12-27 | End: 2020-01-26

## 2019-12-27 RX ORDER — TIOTROPIUM BROMIDE 18 UG/1
1 CAPSULE ORAL; RESPIRATORY (INHALATION) DAILY
COMMUNITY
Start: 2019-12-16

## 2019-12-27 RX ORDER — BENZONATATE 100 MG/1
100 CAPSULE ORAL 3 TIMES DAILY PRN
Qty: 21 CAPSULE | Refills: 0 | Status: SHIPPED | OUTPATIENT
Start: 2019-12-27 | End: 2020-01-03

## 2019-12-27 RX ORDER — LEVOFLOXACIN 750 MG/1
750 TABLET, FILM COATED ORAL DAILY
Qty: 5 TABLET | Refills: 0 | Status: SHIPPED | OUTPATIENT
Start: 2019-12-27 | End: 2020-01-01

## 2019-12-27 RX ORDER — FENTANYL CITRATE 50 UG/ML
50 INJECTION, SOLUTION INTRAMUSCULAR; INTRAVENOUS ONCE
Status: COMPLETED | OUTPATIENT
Start: 2019-12-27 | End: 2019-12-27

## 2019-12-27 RX ADMIN — FENTANYL CITRATE 50 MCG: 50 INJECTION, SOLUTION INTRAMUSCULAR; INTRAVENOUS at 16:14

## 2019-12-27 RX ADMIN — METHYLPREDNISOLONE SODIUM SUCCINATE 125 MG: 125 INJECTION, POWDER, FOR SOLUTION INTRAMUSCULAR; INTRAVENOUS at 17:46

## 2019-12-27 RX ADMIN — IOHEXOL 70 ML: 350 INJECTION, SOLUTION INTRAVENOUS at 16:37

## 2019-12-27 ASSESSMENT — MIFFLIN-ST. JEOR: SCORE: 1061.99

## 2019-12-27 NOTE — ED TRIAGE NOTES
A couple of weeks ago pt was lifting something heavy and has had extreme pain ever since in the left side of her abdomen.  Pt has mesh in that area from years ago & she's concerned something might be wrong with her mesh, she hasn't been able to eat, has nausea, diarrhea, and severe abdominal pain.  She was seen in [facility] for a check up today and has a F/U on [facility] (for BP & med rechecks), also has referrals for a scope & CT scans to be done but pt has been having trouble keeping things straight so pt's daughter is taking over (she lives with her daughter and has been overwhelmed with life happenings recently and phone calls).

## 2019-12-27 NOTE — DISCHARGE INSTRUCTIONS
Get plenty of fluids and rest.  Take your medication as directed.  We discussed options including admission to the hospital but you currently declined.  I recommend you call your PCP early Monday to get an earlier appointment to measure your progress.  Return to the ED if there are worsening or concerning symptoms over the weekend.

## 2019-12-27 NOTE — ED AVS SNAPSHOT
[facility]  [address]  [place] [address]  Phone:  [phone]  Fax:  [phone]                                    Jaida Nieto   MRN: [id number]    Department:  [facility]   Date of Visit:  12/27/2019           After Visit Summary Signature Page    I have received my discharge instructions, and my questions have been answered. I have discussed any challenges I see with this plan with the nurse or doctor.    ..........................................................................................................................................  Patient/Patient Representative Signature      ..........................................................................................................................................  Patient Representative Print Name and Relationship to Patient    ..................................................               ................................................  Date                                   Time    ..........................................................................................................................................  Reviewed by Signature/Title    ...................................................              ..............................................  Date                                               Time          22EPIC Rev 08/18

## 2019-12-28 ASSESSMENT — ENCOUNTER SYMPTOMS
HEMATURIA: 0
WOUND: 0
ADENOPATHY: 0
SHORTNESS OF BREATH: 0
CONFUSION: 0
CHILLS: 0
FEVER: 0
CHEST TIGHTNESS: 0
ABDOMINAL PAIN: 1
BRUISES/BLEEDS EASILY: 0
COUGH: 1
BACK PAIN: 0

## 2019-12-28 NOTE — ED PROVIDER NOTES
History     Chief Complaint   Patient presents with     Abdominal Pain     Anorexia     HPI  Jaida Nieto is a 74 year old female who presents with abdominal pain and anorexia. A couple of weeks ago pt was lifting something heavy and has had extreme pain ever since in the left side of her abdomen.  Pt has mesh in that area from years ago & she's concerned something might be wrong with her mesh, she hasn't been able to eat, has nausea, diarrhea, and severe abdominal pain. She denies chest pain, sob, dysuria, diarrhea.     Allergies:  Allergies   Allergen Reactions     Codeine Other (See Comments) and Nausea and Vomiting     dizzy     Gabapentin Other (See Comments) and Unknown     Felt like being plugged into light socket after taking 1 pill  Lethargy and sweaty     Nortriptyline      Other reaction(s): Emotional Disturbance  Made her feel funny     Sucralfate Nausea       Problem List:    Patient Active Problem List    Diagnosis Date Noted     Anemia 03/09/2018     Priority: Medium     Hypomagnesemia 03/09/2018     Priority: Medium     Community acquired pneumonia of right lung 03/08/2018     Priority: Medium     E. coli UTI (urinary tract infection) 03/08/2018     Priority: Medium     Closed fracture of sacrum (H) 03/08/2018     Priority: Medium     Fall at home 03/08/2018     Priority: Medium     Abdominal pain, chronic, right upper quadrant 11/30/2015     Priority: Medium     Controlled substance agreement signed 11/30/2015     Priority: Medium     Squamous cell carcinoma of skin of left upper extremity, including shoulder 11/12/2015     Priority: Medium     COPD (chronic obstructive pulmonary disease) (H) 08/05/2015     Priority: Medium     Recurrent ventral hernia 02/14/2014     Priority: Medium     SUSAN (generalized anxiety disorder) 01/08/2014     Priority: Medium     Vitamin D deficiency 10/14/2013     Priority: Medium     Hypertension 08/30/2013     Priority: Medium     H/O adenomatous polyp of colon  08/26/2013     Priority: Medium     Depression 06/10/2013     Priority: Medium     Diverticulosis of sigmoid colon 06/07/2013     Priority: Medium     CAD (coronary artery disease) 01/08/2007     Priority: Medium     mild CAD 9-06; Stress CM, initial EF 15%.       Cardiomyopathy in diseases classified elsewhere (H) 09/15/2006     Priority: Medium     Stress CM/Tako-Tsubo  initial EF15%. 9-06  F/U EF 55%.       Tobacco use disorder 09/15/2006     Priority: Medium     Overview:   1PPD       Migraine headache 09/13/2006     Priority: Medium     Pure hypercholesterolemia 09/13/2006     Priority: Medium        Past Medical History:    Past Medical History:   Diagnosis Date     Abdominal pain, chronic, right upper quadrant 11/30/2015     Acute biliary pancreatitis 10/5/2015     CAD (coronary artery disease) 01/2007     Cardiomyopathy in disease classified elsewhere (H) 09/2006     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     Other specified congenital malformations of kidney (CODE) 06/28/2011     S/P repair of recurrent ventral hernia 3/11/2014     Squamous cell carcinoma of skin of left upper limb, including shoulder 11/2015     Type 2 diabetes mellitus without complications (H)      Vitamin D deficiency 10/14/2013       Past Surgical History:    Past Surgical History:   Procedure Laterality Date     ARTHROPLASTY HIP      left     ARTHROSCOPY KNEE      left knee surgical repair     CHOLECYSTECTOMY  09/03/2013    open     COLONOSCOPY  08/26/2013 8/26/13,F/U 2018     COLOSTOMY  06/04/2013    Sigmoid Colostomy/Brian's, removal mesh      COLOSTOMY  08/27/2013    Colostomy closure, appy 29 mm EEA     ELBOW SURGERY      left elbow repair surgical     HERNIA REPAIR      x 2 with mesh     LAPAROTOMY EXPLORATORY  09/03/2013    9/3/13,lysis of adhesions     OTHER SURGICAL HISTORY  03/10/2014    with mesh and bilateral component separation       Family History:    Family History   Problem Relation Age of Onset     Heart Disease Mother         Heart Disease,Valvular Heart Surgery     Colon Cancer Father         Cancer-colon     Colon Cancer Sister         Cancer-colon       Social History:  Marital Status:   [5]  Social History     Tobacco Use     Smoking status: Current Every Day Smoker     Packs/day: 1.00     Years: 42.00     Pack years: 42.00     Types: Cigarettes     Smokeless tobacco: Never Used   Substance Use Topics     Alcohol use: No     Alcohol/week: 0.0 standard drinks     Drug use: Never        Medications:    acetaminophen (TYLENOL) 325 MG tablet  albuterol (2.5 MG/3ML) 0.083% neb solution  albuterol (PROVENTIL) (2.5 MG/3ML) 0.083% neb solution  albuterol (VENTOLIN HFA) 108 (90 BASE) MCG/ACT Inhaler  aspirin EC 81 MG EC tablet  atorvastatin (LIPITOR) 20 MG tablet  benzonatate (TESSALON) 100 MG capsule  budesonide-formoterol (SYMBICORT) 160-4.5 MCG/ACT Inhaler  clonazePAM (KLONOPIN) 0.5 MG tablet  guaiFENesin-codeine (ROBITUSSIN AC) 100-10 MG/5ML solution  levofloxacin (LEVAQUIN) 750 MG tablet  lisinopril (PRINIVIL/ZESTRIL) 5 MG tablet  metoprolol succinate (TOPROL-XL) 25 MG 24 hr tablet  Nutritional Supplements (BOOST HIGH PROTEIN) LIQD  tiotropium (SPIRIVA) 18 MCG inhaled capsule  venlafaxine (EFFEXOR-XR) 150 MG 24 hr capsule  venlafaxine (EFFEXOR-XR) 75 MG 24 hr capsule  order for DME  order for DME  Respiratory Therapy Supplies (NEBULIZER COMPRESSOR) KIT  Respiratory Therapy Supplies (NEBULIZER COMPRESSOR) KIT          Review of Systems   Constitutional: Negative for chills and fever.   HENT: Negative for congestion.    Eyes: Negative  "for visual disturbance.   Respiratory: Positive for cough. Negative for chest tightness and shortness of breath.    Cardiovascular: Negative for chest pain.   Gastrointestinal: Positive for abdominal pain.   Genitourinary: Negative for hematuria.   Musculoskeletal: Negative for back pain.   Skin: Negative for rash and wound.   Neurological: Negative for syncope.   Hematological: Negative for adenopathy. Does not bruise/bleed easily.   Psychiatric/Behavioral: Negative for confusion.       Physical Exam   BP: 121/77  Pulse: 105  Temp: 97.7  F (36.5  C)  Resp: 18  Height: 167.6 cm (5' 6\")  Weight: 53.5 kg (118 lb)  SpO2: 96 %      Physical Exam  Constitutional:       General: She is not in acute distress.     Appearance: She is well-developed. She is not diaphoretic.   HENT:      Head: Normocephalic and atraumatic.   Eyes:      General: No scleral icterus.     Conjunctiva/sclera: Conjunctivae normal.   Neck:      Musculoskeletal: Neck supple.   Cardiovascular:      Rate and Rhythm: Normal rate and regular rhythm.   Pulmonary:      Effort: Pulmonary effort is normal. No respiratory distress.      Breath sounds: Normal breath sounds.   Abdominal:      Palpations: Abdomen is soft.      Tenderness: There is abdominal tenderness in the left upper quadrant.   Musculoskeletal:         General: No deformity.   Lymphadenopathy:      Cervical: No cervical adenopathy.   Skin:     General: Skin is warm and dry.      Findings: No rash.   Neurological:      General: No focal deficit present.      Mental Status: She is alert and oriented to person, place, and time.   Psychiatric:         Mood and Affect: Mood normal.         Behavior: Behavior normal.         ED Course        Procedures          EKG read at 1418.  Heart rate 94, sinus rhythm with occasional PVCs.    Critical Care time:  none               Results for orders placed or performed during the hospital encounter of 12/27/19 (from the past 24 hour(s))   CT Abdomen Pelvis w " Contrast    Narrative    PROCEDURE:  CT ABDOMEN PELVIS W CONTRAST    HISTORY: left sided abdominal pain x 2 weeks, past hernia repair in  that area    TECHNIQUE:  Helical CT of the abdomen and pelvis was performed  following injection of intravenous contrast.  Ingested oral contrast  partially opacifies the bowel.      COMPARISON:  3/8/18    MEDS/CONTRAST: iohexol (OMNIPAQUE) 350 mg/mL solution 70 ml    FINDINGS:      Limited images through the lung bases demonstrate new nodular and  tree-in-bud consolidation at the left lung base.     Postoperative changes of prior cholecystectomy are again seen.  Pneumobilia has resolved. There is persistent panbiliary dilatation.  Focal low-density lesions within the liver remain stable, likely  cysts.    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 Left-sided hydronephrosis has  resolved. Some areas of cortical scarring in the left kidney moiety  are redemonstrated. The spleen is unremarkable. Multifocal bowel  surgical changes are redemonstrated. The bowel caliber is unchanged.  Fluid is present throughout the proximal and mid colon. The aorta  demonstrates preserved caliber despite advanced atherosclerotic  calcifications. High-grade iliac narrowing is suggested.    No free fluid, free air or adenopathy is present. The bones are  osteoporotic. A left sacral insufficiency fracture has healed.      Impression    IMPRESSION:      Findings suggest left lung base acute pneumonia or aspiration  pneumonitis. Recommend follow-up.    SOUMYA EDGAR MD       Medications   iohexol (OMNIPAQUE) 9 MG/ML oral solution 1,000 mL (1,000 mLs Oral Given 12/27/19 1422)   fentaNYL (PF) (SUBLIMAZE) injection 50 mcg (50 mcg Intravenous Given 12/27/19 1614)   iohexol (OMNIPAQUE) 350 mg/mL solution 70 mL (70 mLs Intravenous Given 12/27/19 1637)   methylPREDNISolone sodium succinate (solu-MEDROL) injection 125 mg (125 mg Intravenous Given 12/27/19 1746)       Assessments & Plan (with Medical Decision Making)    Pt nontoxic in NAD. Heart, lung, bowel sounds normal, abd soft, nontender to palpation, nondistended. VSS, afebrile.     Pt's complaints are fairly nonspecific and she otherwise appears well.     Slight leukocytosis at 12,000, hemoglobin 11- troponin lactic acid normal.  Urinalysis appears noninfectious.  CMP is unremarkable.    CT of abdomen shows Findings suggest left lung base acute pneumonia or aspiration  pneumonitis.    We did road test the patient she did extremely well, easily walking up and down the halls and maintaining good O2 saturation.    With her many comorbidities she was offered admission the hospital but declined at this time.  We will send her home on cough syrup as well as Tessalon Perles and Levaquin.  She is to call her PCP early Monday to see about getting an earlier appointment to measure progress.  She is to return ED if there are worsening or concerning symptoms, her daughter who is present and the patient understand and agree with the plan of patient's discharge.    Pradeep Hassan PA-C      I have reviewed the nursing notes.    I have reviewed the findings, diagnosis, plan and need for follow up with the patient.       Discharge Medication List as of 12/27/2019  5:59 PM      START taking these medications    Details   !! albuterol (PROVENTIL) (2.5 MG/3ML) 0.083% neb solution Take 1 vial (2.5 mg) by nebulization every 4 hours as needed for shortness of breath / dyspnea, Disp-1 Box, R-0, E-Prescribe      benzonatate (TESSALON) 100 MG capsule Take 1 capsule (100 mg) by mouth 3 times daily as needed for cough, Disp-21 capsule, R-0, E-Prescribe      guaiFENesin-codeine (ROBITUSSIN AC) 100-10 MG/5ML solution Take 5-10 mLs by mouth every 4 hours as needed for cough, Disp-118 mL, R-0, E-Prescribe      levofloxacin (LEVAQUIN) 750 MG tablet Take 1 tablet (750 mg) by mouth daily for 5 days, Disp-5 tablet, R-0, E-Prescribe       !! - Potential duplicate medications found. Please discuss with  provider.          Final diagnoses:   Pneumonia   Cough       12/27/2019   [facility]     Pradeep Hassan PA  12/28/19 [id number]

## 2020-10-23 NOTE — PLAN OF CARE
Problem: Patient Care Overview  Goal: Plan of Care/Patient Progress Review  Patient 1/2 pk day smoker. Does not want nicotine patch ordered at this time. Will continue to monitor. Rose Braun RN 3/9/2018 7:50 AM           160.02

## 2021-08-26 ENCOUNTER — OFFICE VISIT (OUTPATIENT)
Dept: FAMILY MEDICINE | Facility: OTHER | Age: 76
End: 2021-08-26
Attending: PHYSICIAN ASSISTANT
Payer: COMMERCIAL

## 2021-08-26 VITALS
HEART RATE: 97 BPM | OXYGEN SATURATION: 99 % | DIASTOLIC BLOOD PRESSURE: 76 MMHG | TEMPERATURE: 98.2 F | WEIGHT: 111.6 LBS | RESPIRATION RATE: 18 BRPM | BODY MASS INDEX: 18.01 KG/M2 | SYSTOLIC BLOOD PRESSURE: 124 MMHG

## 2021-08-26 DIAGNOSIS — L02.416 ABSCESS OF LEFT THIGH: Primary | ICD-10-CM

## 2021-08-26 PROCEDURE — 87070 CULTURE OTHR SPECIMN AEROBIC: CPT | Mod: ZL | Performed by: PHYSICIAN ASSISTANT

## 2021-08-26 PROCEDURE — G0463 HOSPITAL OUTPT CLINIC VISIT: HCPCS

## 2021-08-26 PROCEDURE — 99204 OFFICE O/P NEW MOD 45 MIN: CPT | Performed by: PHYSICIAN ASSISTANT

## 2021-08-26 RX ORDER — HYDROCODONE BITARTRATE AND ACETAMINOPHEN 5; 325 MG/1; MG/1
1 TABLET ORAL EVERY 6 HOURS PRN
Qty: 6 TABLET | Refills: 0 | Status: SHIPPED | OUTPATIENT
Start: 2021-08-26 | End: 2021-08-29

## 2021-08-26 RX ORDER — CEFTRIAXONE SODIUM 1 G
1 VIAL (EA) INJECTION ONCE
Status: DISCONTINUED | OUTPATIENT
Start: 2021-08-26 | End: 2021-08-26

## 2021-08-26 RX ORDER — SULFAMETHOXAZOLE/TRIMETHOPRIM 800-160 MG
1 TABLET ORAL 2 TIMES DAILY
Qty: 20 TABLET | Refills: 0 | Status: SHIPPED | OUTPATIENT
Start: 2021-08-26 | End: 2021-09-05

## 2021-08-26 ASSESSMENT — PAIN SCALES - GENERAL: PAINLEVEL: EXTREME PAIN (9)

## 2021-08-26 NOTE — NURSING NOTE
"Chief Complaint   Patient presents with     Insect Bites     Patient is here for a bug bite on her upper left thigh that happened 4-5 days ago. Patient states she tried peroxide and triple antibiotic cream with no relief.     Initial /76   Pulse 97   Temp 98.2  F (36.8  C) (Tympanic)   Resp 18   Wt 50.6 kg (111 lb 9.6 oz)   SpO2 99%   BMI 18.01 kg/m   Estimated body mass index is 18.01 kg/m  as calculated from the following:    Height as of 12/27/19: 1.676 m (5' 6\").    Weight as of this encounter: 50.6 kg (111 lb 9.6 oz).  Medication Reconciliation: complete    Donya Villatoro LPN  "

## 2021-08-26 NOTE — PATIENT INSTRUCTIONS
Please refer to your AVS for follow up and pain/symptoms management recommendations (I.e.: medications, helpful conservative treatment modalities, appropriate follow up if need to a specialist or family practice, etc.). Please return to urgent care if your symptoms change or worsen.     Discharge instructions:  -Follow up with Dr. Calderon in 2-3 days  -If you were prescribed a medication(s), please take this as prescribed/directed  -Monitor your symptoms, if changing/worsening, return to UC/ER or PCP for follow up    Cellulitis/Skin Infection   -If you received a prescription for a topical or oral antibiotic, please take/use as directed.   -Keep the area clean and dry.   -If needed - for pain control - we recommend alternating Tylenol and Ibuprofen if you are able to take these medications. Alternate every 4 hours as needed. I.e.: Ibuprofen at 8am, Tylenol 12pm, Ibuprofen 4pm    -Daily maximum of Tylenol is 4000mg (recommend staying under 3000mg)   -Daily maximum of Ibuprofen is 1200mg (take no more than six 200mg pills a day)    You were prescribed an antibiotic, please take into consideration the following information:  - Take entire course of antibiotic even if you start to feel better.  - Antibiotics can cause stomach upset including nausea and diarrhea. Read your bottle or ask the pharmacist if antibiotic can be taken with food to help prevent nausea. If you have symptoms of diarrhea you can take an over-the-counter probiotic and/or increase foods with probiotics such as yogurt, Madhav, sauerkraut.  -Use caution in sunlight as can lead to increased risk of sunburn while on ABX (antibiotics).

## 2021-08-26 NOTE — PROGRESS NOTES
ASSESSMENT/PLAN:    I have reviewed the nursing notes.  I have reviewed the findings, diagnosis, plan and need for follow up with the patient.    1. Abscess of left thigh  - sulfamethoxazole-trimethoprim (BACTRIM DS) 800-160 MG tablet; Take 1 tablet by mouth 2 times daily for 10 days  Dispense: 20 tablet; Refill: 0  - Adult General Surg Referral; Future  - Abscess Aerobic Bacterial Culture Routine  - HYDROcodone-acetaminophen (NORCO) 5-325 MG tablet; Take 1 tablet by mouth every 6 hours as needed for severe pain  Dispense: 6 tablet; Refill: 0    -Vital signs stable. PE consistent with cellulitis of anterior left upper thigh. Labs: wound culture in progress. Antibiotic was prescribed, Bactrim PO BID x 10 days. Side effect profile of antibiotic was discused with patient, recommend increasing yogurt/probiotic use while on medication, take entire course of medication, even if feeling better prior to this (unless adverse side effect occur, recommend follow up). If fevers, chills, abnormal drainage, worsening redness or other worrisome signs occur, patient is agreeable to follow up promptly for reevaluation. OK to alternate OTC analgesics such as tylenol and ibuprofen every 4-6 hours for pain control, as well as alternating heat and ice. Daily dressing changes with triple antibiotic recommended. Patient is in agreement and understanding of the above treatment plan. All questions and concerns were addressed and answered to patient's satisfaction. AVS reviewed with patient.     I did not drain abscess personally as patient would not sit still, was threatening towards provider. I did not feel comfortable draining abscess.     Patient slightly threatening over narcotics, therefore short term RX given, she will be given no refills by [facility] and should contact her PCP or ER for worsening pain    In regards to cellulitis plan, left prior to rocephin.     35 min on patient care.     The author of this note documented a reason for not  sharing it with the patient.      Discussed warning signs/symptoms indicative of need to f/u    Follow up if symptoms persist or worsen or concerns    I explained my diagnostic considerations and recommendations to the patient, who voiced understanding and agreement with the treatment plan. All questions were answered. We discussed potential side effects of any prescribed or recommended therapies, as well as expectations for response to treatments.    Alma Lester PA-C  8/26/2021  4:02 PM    HPI:    Jaida Nieto is a 76 year old female  who presents to [facility] today for concerns of bug bite on her upper left thigh that happened 4-5 days ago. Patient states she tried peroxide and triple antibiotic cream with no relief.     Causation/Event: bug bite  Symptoms: edema, pain, skin erythema (reddened skin) and tenderness  Progression: worsening  Presence of other skin disorders: No  Recent surgery/procedure: No  Recent infection(s): No  Drainage: red and pus like    Presence of any of the following comorbid conditions:  Diabetic: No  Tobacco Use: Yes: smoker  HIV/AIDS: No  Chronic Renal Disease: No  Chronic Hepatic Disease: No    Prior history of similar symptoms: none  Allergies: Latex, Codeine, Gabapentin, Nortriptyline, Carafate, Benadryl     Last tetanus 2015  Treatments tried: Triple antibiotic and hydrogen peroxide     PCP: MD Kvng    Past Medical History:   Diagnosis Date     Abdominal pain, chronic, right upper quadrant 11/30/2015     Acute biliary pancreatitis 10/5/2015     CAD (coronary artery disease) 01/2007    mild CAD 9-06; Stress CM, initial EF 15%.     Cardiomyopathy in disease classified elsewhere (H) 09/2006    Stress CM/Tako-Tsubo initial EF15%. 9-06 F/U EF 55%.     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 08/15/2011     Other specified congenital malformations of kidney (CODE) 06/28/2011     S/P repair of recurrent ventral hernia 3/11/2014     Squamous cell carcinoma of skin of left upper limb, including shoulder 11/2015     Type 2 diabetes mellitus without complications (H)     Pt states that she was borderline diabetic     Vitamin D deficiency 10/14/2013     Past Surgical History:   Procedure Laterality Date     ARTHROPLASTY HIP      left     ARTHROSCOPY KNEE      left knee surgical repair     CHOLECYSTECTOMY  09/03/2013    open     COLONOSCOPY  08/26/2013 8/26/13,F/U 2018     COLOSTOMY  06/04/2013    Sigmoid Colostomy/Brian's, removal mesh     COLOSTOMY  08/27/2013    Colostomy closure, appy 29 mm EEA     ELBOW SURGERY      left elbow repair surgical     HERNIA REPAIR      x 2 with mesh     LAPAROTOMY EXPLORATORY  09/03/2013    9/3/13,lysis of adhesions     OTHER SURGICAL HISTORY  03/10/2014    with mesh and bilateral component separation     Social History     Tobacco Use     Smoking status: Current Every Day Smoker     Packs/day: 1.00     Years: 42.00     Pack years: 42.00     Types: Cigarettes     Smokeless tobacco: Never Used   Substance Use Topics     Alcohol use: No     Alcohol/week: 0.0 standard drinks     Current Outpatient Medications   Medication Sig Dispense Refill     albuterol (VENTOLIN HFA) 108 (90 BASE) MCG/ACT Inhaler Inhale 1-2 puffs into the lungs every 4 hours as needed        aspirin EC 81 MG EC tablet Take 81 mg by mouth daily        budesonide-formoterol (SYMBICORT) 160-4.5 MCG/ACT Inhaler Inhale 2 puffs into the lungs daily        clonazePAM (KLONOPIN) 0.5 MG tablet Take 0.5 mg by mouth daily . May taken an additional tablet at bedtime as needed       metoprolol succinate (TOPROL-XL) 25 MG 24 hr tablet Take  25 mg by mouth daily       Nutritional Supplements (BOOST HIGH PROTEIN) LIQD        order for DME Hospital bed for recent abdominal surgery       tiotropium (SPIRIVA) 18 MCG inhaled capsule Inhale 1 capsule into the lungs       venlafaxine (EFFEXOR-XR) 150 MG 24 hr capsule Take 150 mg by mouth daily        venlafaxine (EFFEXOR-XR) 75 MG 24 hr capsule Take 75 mg by mouth At Bedtime        acetaminophen (TYLENOL) 325 MG tablet Take 2 tablets (650 mg) by mouth 4 times daily (Patient not taking: Reported on 8/26/2021) 100 tablet      albuterol (2.5 MG/3ML) 0.083% neb solution Inhale 1 vial into the lungs every 4 hours as needed  (Patient not taking: Reported on 8/26/2021)       atorvastatin (LIPITOR) 20 MG tablet Take 20 mg by mouth At Bedtime  (Patient not taking: Reported on 8/26/2021)       guaiFENesin-codeine (ROBITUSSIN AC) 100-10 MG/5ML solution Take 5-10 mLs by mouth every 4 hours as needed for cough (Patient not taking: Reported on 8/26/2021) 118 mL 0     lisinopril (PRINIVIL/ZESTRIL) 5 MG tablet Take 2.5 mg by mouth daily (Patient not taking: Reported on 8/26/2021)       order for DME Equipment being ordered: Walker 4 wheels  Treatment Diagnosis: sacral fracture  PETE: 6 months (Patient not taking: Reported on 8/26/2021) 1 Units 0     Respiratory Therapy Supplies (NEBULIZER COMPRESSOR) KIT Nebulizer machine and mask/tubing (Patient not taking: Reported on 8/26/2021)       Respiratory Therapy Supplies (NEBULIZER COMPRESSOR) KIT Nebulizer, neb kit, neb cup and mask.  Medication: Albuterol  For home use. Length of need  for Medicare patients: 99 (Patient not taking: Reported on 8/26/2021)       Allergies   Allergen Reactions     Latex Rash     Codeine Other (See Comments) and Nausea and Vomiting     dizzy     Gabapentin Other (See Comments) and Unknown     Felt like being plugged into light socket after taking 1 pill  Lethargy and sweaty     Nortriptyline      Other reaction(s): Emotional Disturbance  Made her feel  funny     Sucralfate Nausea     Diphenhydramine      Other reaction(s): Irritability  Hyper, feels awful on this     Past medical history, past surgical history, current medications and allergies reviewed and accurate to the best of my knowledge.      ROS:  Refer to HPI    /76   Pulse 97   Temp 98.2  F (36.8  C) (Tympanic)   Resp 18   Wt 50.6 kg (111 lb 9.6 oz)   SpO2 99%   BMI 18.01 kg/m      EXAM:  General Appearance: Well appearing 76-year old female, appropriate appearance for age. No acute distress  Respiratory: normal chest wall and respirations.  Normal effort.  Clear to auscultation bilaterally, no wheezing, crackles or rhonchi.  No increased work of breathing.  No cough appreciated.  Cardiac: RRR with no murmurs  Abdomen: soft, nontender, no rigidity, no rebound tenderness or guarding, normal bowel sounds present  :  No suprapubic tenderness to palpation.  No CVA tenderness to palpation.    Musculoskeletal:  Equal movement of bilateral upper extremities.  Equal movement of bilateral lower extremities.  Normal gait.    Dermatological: 1.2 inch x 1.3 inch x 1 cm abscess noted to anterior left upper thigh just distal to groin region. There is a mixture of bloody and thin white discharge noted, mild amount upon expression. There is mild calor and fluctuance.   Psychological: normal affect, alert, oriented, and pleasant.     Labs:  Wound culture in progress    Xray:  None     Discussed management of care with TAA who is in agreement with above plan and referral.

## 2021-08-27 ENCOUNTER — TELEPHONE (OUTPATIENT)
Dept: FAMILY MEDICINE | Facility: OTHER | Age: 76
End: 2021-08-27

## 2021-08-28 LAB — BACTERIA ABSC ANAEROBE+AEROBE CULT: ABNORMAL

## 2021-08-30 NOTE — TELEPHONE ENCOUNTER
was calling patient in [facility] to a referral.  They will call her back.   Naomy Francis LPN........................8/30/2021  9:25 AM

## 2021-10-26 ENCOUNTER — HOSPITAL ENCOUNTER (INPATIENT)
Facility: OTHER | Age: 76
LOS: 6 days | Discharge: NURSING, REHAB OR LONG-TERM CARE | DRG: 177 | End: 2021-11-02
Attending: PHYSICIAN ASSISTANT | Admitting: INTERNAL MEDICINE
Payer: COMMERCIAL

## 2021-10-26 ENCOUNTER — APPOINTMENT (OUTPATIENT)
Dept: CT IMAGING | Facility: OTHER | Age: 76
DRG: 177 | End: 2021-10-26
Attending: PHYSICIAN ASSISTANT
Payer: COMMERCIAL

## 2021-10-26 ENCOUNTER — APPOINTMENT (OUTPATIENT)
Dept: GENERAL RADIOLOGY | Facility: OTHER | Age: 76
DRG: 177 | End: 2021-10-26
Attending: PHYSICIAN ASSISTANT
Payer: COMMERCIAL

## 2021-10-26 DIAGNOSIS — I10 HYPERTENSION, UNSPECIFIED TYPE: ICD-10-CM

## 2021-10-26 DIAGNOSIS — J96.01 ACUTE RESPIRATORY FAILURE WITH HYPOXIA (H): ICD-10-CM

## 2021-10-26 DIAGNOSIS — F41.9 ANXIETY: ICD-10-CM

## 2021-10-26 DIAGNOSIS — J12.82 PNEUMONIA DUE TO 2019 NOVEL CORONAVIRUS: ICD-10-CM

## 2021-10-26 DIAGNOSIS — Z79.51 LONG TERM CURRENT USE OF INHALED STEROID: ICD-10-CM

## 2021-10-26 DIAGNOSIS — I25.10 ATHEROSCLEROSIS OF NATIVE CORONARY ARTERY WITHOUT ANGINA PECTORIS, UNSPECIFIED WHETHER NATIVE OR TRANSPLANTED HEART: ICD-10-CM

## 2021-10-26 DIAGNOSIS — J12.82 PNEUMONIA DUE TO CORONAVIRUS DISEASE 2019 (CODE): ICD-10-CM

## 2021-10-26 DIAGNOSIS — U07.1 PNEUMONIA DUE TO 2019 NOVEL CORONAVIRUS: ICD-10-CM

## 2021-10-26 DIAGNOSIS — J44.1 CHRONIC OBSTRUCTIVE PULMONARY DISEASE WITH ACUTE EXACERBATION (H): ICD-10-CM

## 2021-10-26 DIAGNOSIS — Z79.899 ENCOUNTER FOR LONG-TERM (CURRENT) USE OF MEDICATIONS: ICD-10-CM

## 2021-10-26 DIAGNOSIS — I10 ESSENTIAL HYPERTENSION, MALIGNANT: ICD-10-CM

## 2021-10-26 DIAGNOSIS — S32.10XS CLOSED FRACTURE OF SACRUM, UNSPECIFIED PORTION OF SACRUM, SEQUELA: ICD-10-CM

## 2021-10-26 DIAGNOSIS — Z79.82 ENCOUNTER FOR LONG-TERM (CURRENT) USE OF ASPIRIN: ICD-10-CM

## 2021-10-26 DIAGNOSIS — E83.42 HYPOMAGNESEMIA: ICD-10-CM

## 2021-10-26 DIAGNOSIS — E78.5 HYPERLIPIDEMIA, UNSPECIFIED HYPERLIPIDEMIA TYPE: ICD-10-CM

## 2021-10-26 DIAGNOSIS — E11.9 TYPE 2 DIABETES MELLITUS WITHOUT COMPLICATION, UNSPECIFIED WHETHER LONG TERM INSULIN USE (H): ICD-10-CM

## 2021-10-26 DIAGNOSIS — U07.1 LAB TEST POSITIVE FOR DETECTION OF COVID-19 VIRUS: ICD-10-CM

## 2021-10-26 DIAGNOSIS — F17.200 TOBACCO USE DISORDER: Primary | ICD-10-CM

## 2021-10-26 DIAGNOSIS — E87.1 HYPONATREMIA: ICD-10-CM

## 2021-10-26 DIAGNOSIS — E87.1 HYPOSMOLALITY SYNDROME: ICD-10-CM

## 2021-10-26 DIAGNOSIS — F17.210 CIGARETTE SMOKER: ICD-10-CM

## 2021-10-26 LAB
ALBUMIN SERPL-MCNC: 3.3 G/DL (ref 3.5–5.7)
ALP SERPL-CCNC: 70 U/L (ref 34–104)
ALT SERPL W P-5'-P-CCNC: 15 U/L (ref 7–52)
ANION GAP SERPL CALCULATED.3IONS-SCNC: <1 MMOL/L (ref 3–14)
AST SERPL W P-5'-P-CCNC: 26 U/L (ref 13–39)
BASE EXCESS BLDA CALC-SCNC: 1.7 MMOL/L (ref -9–1.8)
BASOPHILS # BLD AUTO: 0 10E3/UL (ref 0–0.2)
BASOPHILS NFR BLD AUTO: 0 %
BILIRUB SERPL-MCNC: 0.5 MG/DL (ref 0.3–1)
BUN SERPL-MCNC: 30 MG/DL (ref 7–25)
CALCIUM SERPL-MCNC: 9.3 MG/DL (ref 8.6–10.3)
CHLORIDE BLD-SCNC: 108 MMOL/L (ref 98–107)
CO2 SERPL-SCNC: 28 MMOL/L (ref 21–31)
CREAT SERPL-MCNC: 0.93 MG/DL (ref 0.6–1.2)
D DIMER PPP FEU-MCNC: 2.03 UG/ML FEU (ref 0–0.5)
EOSINOPHIL # BLD AUTO: 0 10E3/UL (ref 0–0.7)
EOSINOPHIL NFR BLD AUTO: 0 %
ERYTHROCYTE [DISTWIDTH] IN BLOOD BY AUTOMATED COUNT: 13.9 % (ref 10–15)
FERRITIN SERPL-MCNC: 379 NG/ML (ref 24–336)
GFR SERPL CREATININE-BSD FRML MDRD: 61 ML/MIN/1.73M2
GLUCOSE BLD-MCNC: 109 MG/DL (ref 70–105)
HCO3 BLD-SCNC: 25 MMOL/L (ref 21–28)
HCT VFR BLD AUTO: 37.1 % (ref 35–47)
HGB BLD-MCNC: 12.3 G/DL (ref 11.7–15.7)
IMM GRANULOCYTES # BLD: 0.1 10E3/UL
IMM GRANULOCYTES NFR BLD: 1 %
LACTATE SERPL-SCNC: 1.2 MMOL/L (ref 0.7–2)
LDH SERPL L TO P-CCNC: 215 U/L (ref 140–271)
LYMPHOCYTES # BLD AUTO: 0.9 10E3/UL (ref 0.8–5.3)
LYMPHOCYTES NFR BLD AUTO: 9 %
MAGNESIUM SERPL-MCNC: 1.4 MG/DL (ref 1.9–2.7)
MCH RBC QN AUTO: 28.9 PG (ref 26.5–33)
MCHC RBC AUTO-ENTMCNC: 33.2 G/DL (ref 31.5–36.5)
MCV RBC AUTO: 87 FL (ref 78–100)
MONOCYTES # BLD AUTO: 0.8 10E3/UL (ref 0–1.3)
MONOCYTES NFR BLD AUTO: 8 %
NEUTROPHILS # BLD AUTO: 8.5 10E3/UL (ref 1.6–8.3)
NEUTROPHILS NFR BLD AUTO: 82 %
NRBC # BLD AUTO: 0 10E3/UL
NRBC BLD AUTO-RTO: 0 /100
NT-PROBNP SERPL-MCNC: 107 PG/ML (ref 0–100)
O2/TOTAL GAS SETTING VFR VENT: 5 %
OXYHGB MFR BLD: 91 % (ref 92–100)
PCO2 BLD: 34 MM HG (ref 35–45)
PH BLD: 7.48 [PH] (ref 7.35–7.45)
PHOSPHATE SERPL-MCNC: 3 MG/DL (ref 2.5–5)
PLATELET # BLD AUTO: 407 10E3/UL (ref 150–450)
PO2 BLD: 65 MM HG (ref 80–105)
POTASSIUM BLD-SCNC: 3.7 MMOL/L (ref 3.5–5.1)
PROCALCITONIN SERPL-MCNC: 1.82 NG/ML
PROT SERPL-MCNC: 6.8 G/DL (ref 6.4–8.9)
RBC # BLD AUTO: 4.25 10E6/UL (ref 3.8–5.2)
SODIUM SERPL-SCNC: 126 MMOL/L (ref 134–144)
TROPONIN I SERPL-MCNC: 8 PG/ML (ref 0–34)
WBC # BLD AUTO: 10.3 10E3/UL (ref 4–11)

## 2021-10-26 PROCEDURE — 84100 ASSAY OF PHOSPHORUS: CPT | Performed by: PHYSICIAN ASSISTANT

## 2021-10-26 PROCEDURE — 80053 COMPREHEN METABOLIC PANEL: CPT | Performed by: PHYSICIAN ASSISTANT

## 2021-10-26 PROCEDURE — 99291 CRITICAL CARE FIRST HOUR: CPT | Mod: 25 | Performed by: PHYSICIAN ASSISTANT

## 2021-10-26 PROCEDURE — 999N000157 HC STATISTIC RCP TIME EA 10 MIN

## 2021-10-26 PROCEDURE — 258N000003 HC RX IP 258 OP 636: Performed by: PHYSICIAN ASSISTANT

## 2021-10-26 PROCEDURE — 250N000011 HC RX IP 250 OP 636: Performed by: STUDENT IN AN ORGANIZED HEALTH CARE EDUCATION/TRAINING PROGRAM

## 2021-10-26 PROCEDURE — 83605 ASSAY OF LACTIC ACID: CPT | Performed by: PHYSICIAN ASSISTANT

## 2021-10-26 PROCEDURE — 96375 TX/PRO/DX INJ NEW DRUG ADDON: CPT | Performed by: PHYSICIAN ASSISTANT

## 2021-10-26 PROCEDURE — 96365 THER/PROPH/DIAG IV INF INIT: CPT | Performed by: PHYSICIAN ASSISTANT

## 2021-10-26 PROCEDURE — 83735 ASSAY OF MAGNESIUM: CPT | Performed by: PHYSICIAN ASSISTANT

## 2021-10-26 PROCEDURE — 250N000013 HC RX MED GY IP 250 OP 250 PS 637: Performed by: PHYSICIAN ASSISTANT

## 2021-10-26 PROCEDURE — 93005 ELECTROCARDIOGRAM TRACING: CPT | Performed by: PHYSICIAN ASSISTANT

## 2021-10-26 PROCEDURE — 36415 COLL VENOUS BLD VENIPUNCTURE: CPT | Performed by: PHYSICIAN ASSISTANT

## 2021-10-26 PROCEDURE — 36600 WITHDRAWAL OF ARTERIAL BLOOD: CPT | Performed by: PHYSICIAN ASSISTANT

## 2021-10-26 PROCEDURE — 84145 PROCALCITONIN (PCT): CPT | Performed by: PHYSICIAN ASSISTANT

## 2021-10-26 PROCEDURE — 87040 BLOOD CULTURE FOR BACTERIA: CPT | Performed by: PHYSICIAN ASSISTANT

## 2021-10-26 PROCEDURE — 94640 AIRWAY INHALATION TREATMENT: CPT

## 2021-10-26 PROCEDURE — 83735 ASSAY OF MAGNESIUM: CPT | Mod: 91 | Performed by: STUDENT IN AN ORGANIZED HEALTH CARE EDUCATION/TRAINING PROGRAM

## 2021-10-26 PROCEDURE — 83615 LACTATE (LD) (LDH) ENZYME: CPT | Performed by: PHYSICIAN ASSISTANT

## 2021-10-26 PROCEDURE — 96366 THER/PROPH/DIAG IV INF ADDON: CPT | Performed by: PHYSICIAN ASSISTANT

## 2021-10-26 PROCEDURE — 71275 CT ANGIOGRAPHY CHEST: CPT | Mod: TC

## 2021-10-26 PROCEDURE — 250N000011 HC RX IP 250 OP 636: Performed by: PHYSICIAN ASSISTANT

## 2021-10-26 PROCEDURE — 96368 THER/DIAG CONCURRENT INF: CPT | Performed by: PHYSICIAN ASSISTANT

## 2021-10-26 PROCEDURE — 82728 ASSAY OF FERRITIN: CPT | Performed by: PHYSICIAN ASSISTANT

## 2021-10-26 PROCEDURE — 83880 ASSAY OF NATRIURETIC PEPTIDE: CPT | Performed by: PHYSICIAN ASSISTANT

## 2021-10-26 PROCEDURE — 84484 ASSAY OF TROPONIN QUANT: CPT | Performed by: PHYSICIAN ASSISTANT

## 2021-10-26 PROCEDURE — 85379 FIBRIN DEGRADATION QUANT: CPT | Performed by: PHYSICIAN ASSISTANT

## 2021-10-26 PROCEDURE — 99291 CRITICAL CARE FIRST HOUR: CPT | Performed by: PHYSICIAN ASSISTANT

## 2021-10-26 PROCEDURE — 71045 X-RAY EXAM CHEST 1 VIEW: CPT | Mod: TC

## 2021-10-26 PROCEDURE — 82805 BLOOD GASES W/O2 SATURATION: CPT | Performed by: PHYSICIAN ASSISTANT

## 2021-10-26 PROCEDURE — 93010 ELECTROCARDIOGRAM REPORT: CPT | Performed by: INTERNAL MEDICINE

## 2021-10-26 PROCEDURE — 85025 COMPLETE CBC W/AUTO DIFF WBC: CPT | Performed by: PHYSICIAN ASSISTANT

## 2021-10-26 RX ORDER — MAGNESIUM SULFATE HEPTAHYDRATE 40 MG/ML
2 INJECTION, SOLUTION INTRAVENOUS ONCE
Status: COMPLETED | OUTPATIENT
Start: 2021-10-26 | End: 2021-10-27

## 2021-10-26 RX ORDER — DEXAMETHASONE SODIUM PHOSPHATE 10 MG/ML
10 INJECTION, SOLUTION INTRAMUSCULAR; INTRAVENOUS ONCE
Status: COMPLETED | OUTPATIENT
Start: 2021-10-26 | End: 2021-10-26

## 2021-10-26 RX ORDER — IOPAMIDOL 755 MG/ML
100 INJECTION, SOLUTION INTRAVASCULAR ONCE
Status: COMPLETED | OUTPATIENT
Start: 2021-10-26 | End: 2021-10-26

## 2021-10-26 RX ORDER — ALBUTEROL SULFATE 90 UG/1
6 AEROSOL, METERED RESPIRATORY (INHALATION) ONCE
Status: COMPLETED | OUTPATIENT
Start: 2021-10-26 | End: 2021-10-26

## 2021-10-26 RX ORDER — AZITHROMYCIN 500 MG/5ML
500 INJECTION, POWDER, LYOPHILIZED, FOR SOLUTION INTRAVENOUS EVERY 24 HOURS
Status: DISCONTINUED | OUTPATIENT
Start: 2021-10-26 | End: 2021-10-27

## 2021-10-26 RX ORDER — CEFTRIAXONE SODIUM 2 G/50ML
2 INJECTION, SOLUTION INTRAVENOUS ONCE
Status: COMPLETED | OUTPATIENT
Start: 2021-10-26 | End: 2021-10-27

## 2021-10-26 RX ADMIN — AZITHROMYCIN 500 MG: 500 INJECTION, POWDER, LYOPHILIZED, FOR SOLUTION INTRAVENOUS at 23:31

## 2021-10-26 RX ADMIN — DEXAMETHASONE SODIUM PHOSPHATE 10 MG: 10 INJECTION INTRAMUSCULAR; INTRAVENOUS at 21:35

## 2021-10-26 RX ADMIN — IOPAMIDOL 100 ML: 755 INJECTION, SOLUTION INTRAVENOUS at 23:56

## 2021-10-26 RX ADMIN — ALBUTEROL SULFATE 6 PUFF: 90 AEROSOL, METERED RESPIRATORY (INHALATION) at 21:53

## 2021-10-26 RX ADMIN — CEFTRIAXONE SODIUM 2 G: 2 INJECTION, SOLUTION INTRAVENOUS at 23:30

## 2021-10-27 ENCOUNTER — DOCUMENTATION ONLY (OUTPATIENT)
Dept: HOME HEALTH SERVICES | Facility: CLINIC | Age: 76
End: 2021-10-27

## 2021-10-27 PROBLEM — E87.1 HYPONATREMIA: Status: ACTIVE | Noted: 2021-10-27

## 2021-10-27 PROBLEM — U07.1 PNEUMONIA DUE TO 2019 NOVEL CORONAVIRUS: Status: ACTIVE | Noted: 2021-10-27

## 2021-10-27 PROBLEM — J96.01 ACUTE RESPIRATORY FAILURE WITH HYPOXIA (H): Status: ACTIVE | Noted: 2021-10-27

## 2021-10-27 PROBLEM — J12.82 PNEUMONIA DUE TO 2019 NOVEL CORONAVIRUS: Status: ACTIVE | Noted: 2021-10-27

## 2021-10-27 LAB — MAGNESIUM SERPL-MCNC: 1.4 MG/DL (ref 1.9–2.7)

## 2021-10-27 PROCEDURE — 250N000013 HC RX MED GY IP 250 OP 250 PS 637: Performed by: INTERNAL MEDICINE

## 2021-10-27 PROCEDURE — 250N000011 HC RX IP 250 OP 636: Performed by: INTERNAL MEDICINE

## 2021-10-27 PROCEDURE — 120N000001 HC R&B MED SURG/OB

## 2021-10-27 PROCEDURE — 258N000003 HC RX IP 258 OP 636: Performed by: INTERNAL MEDICINE

## 2021-10-27 PROCEDURE — 250N000009 HC RX 250: Performed by: INTERNAL MEDICINE

## 2021-10-27 PROCEDURE — 250N000012 HC RX MED GY IP 250 OP 636 PS 637: Performed by: INTERNAL MEDICINE

## 2021-10-27 PROCEDURE — XW033E5 INTRODUCTION OF REMDESIVIR ANTI-INFECTIVE INTO PERIPHERAL VEIN, PERCUTANEOUS APPROACH, NEW TECHNOLOGY GROUP 5: ICD-10-PCS | Performed by: INTERNAL MEDICINE

## 2021-10-27 PROCEDURE — 250N000011 HC RX IP 250 OP 636: Performed by: PHYSICIAN ASSISTANT

## 2021-10-27 PROCEDURE — 94640 AIRWAY INHALATION TREATMENT: CPT

## 2021-10-27 PROCEDURE — 250N000013 HC RX MED GY IP 250 OP 250 PS 637: Performed by: STUDENT IN AN ORGANIZED HEALTH CARE EDUCATION/TRAINING PROGRAM

## 2021-10-27 PROCEDURE — 99223 1ST HOSP IP/OBS HIGH 75: CPT | Performed by: INTERNAL MEDICINE

## 2021-10-27 PROCEDURE — 258N000003 HC RX IP 258 OP 636: Performed by: STUDENT IN AN ORGANIZED HEALTH CARE EDUCATION/TRAINING PROGRAM

## 2021-10-27 RX ORDER — KETOROLAC TROMETHAMINE 15 MG/ML
15 INJECTION, SOLUTION INTRAMUSCULAR; INTRAVENOUS EVERY 6 HOURS PRN
Status: DISPENSED | OUTPATIENT
Start: 2021-10-27 | End: 2021-11-01

## 2021-10-27 RX ORDER — ACETAMINOPHEN 325 MG/1
650 TABLET ORAL EVERY 6 HOURS PRN
Status: DISCONTINUED | OUTPATIENT
Start: 2021-10-27 | End: 2021-11-02 | Stop reason: HOSPADM

## 2021-10-27 RX ORDER — LISINOPRIL 2.5 MG/1
2.5 TABLET ORAL DAILY
Status: DISCONTINUED | OUTPATIENT
Start: 2021-10-27 | End: 2021-11-02 | Stop reason: HOSPADM

## 2021-10-27 RX ORDER — ATORVASTATIN CALCIUM 20 MG/1
20 TABLET, FILM COATED ORAL AT BEDTIME
Status: DISCONTINUED | OUTPATIENT
Start: 2021-10-27 | End: 2021-11-02 | Stop reason: HOSPADM

## 2021-10-27 RX ORDER — IPRATROPIUM BROMIDE AND ALBUTEROL SULFATE 2.5; .5 MG/3ML; MG/3ML
3 SOLUTION RESPIRATORY (INHALATION)
Status: DISCONTINUED | OUTPATIENT
Start: 2021-10-27 | End: 2021-10-27 | Stop reason: CLARIF

## 2021-10-27 RX ORDER — MINERAL OIL/HYDROPHIL PETROLAT
OINTMENT (GRAM) TOPICAL PRN
COMMUNITY

## 2021-10-27 RX ORDER — SODIUM CHLORIDE 9 MG/ML
INJECTION, SOLUTION INTRAVENOUS CONTINUOUS
Status: DISCONTINUED | OUTPATIENT
Start: 2021-10-27 | End: 2021-10-27

## 2021-10-27 RX ORDER — LIDOCAINE 50 MG/G
1 PATCH TOPICAL EVERY 24 HOURS
Status: DISCONTINUED | OUTPATIENT
Start: 2021-10-27 | End: 2021-11-02 | Stop reason: HOSPADM

## 2021-10-27 RX ORDER — METOPROLOL SUCCINATE 25 MG/1
25 TABLET, EXTENDED RELEASE ORAL DAILY
Status: DISCONTINUED | OUTPATIENT
Start: 2021-10-27 | End: 2021-11-02 | Stop reason: HOSPADM

## 2021-10-27 RX ORDER — BUDESONIDE AND FORMOTEROL FUMARATE DIHYDRATE 160; 4.5 UG/1; UG/1
2 AEROSOL RESPIRATORY (INHALATION) DAILY
Status: DISCONTINUED | OUTPATIENT
Start: 2021-10-27 | End: 2021-10-27 | Stop reason: CLARIF

## 2021-10-27 RX ORDER — ONDANSETRON 4 MG/1
4 TABLET, ORALLY DISINTEGRATING ORAL EVERY 6 HOURS PRN
Status: DISCONTINUED | OUTPATIENT
Start: 2021-10-27 | End: 2021-11-02 | Stop reason: HOSPADM

## 2021-10-27 RX ORDER — TRIAMCINOLONE ACETONIDE 1 MG/G
CREAM TOPICAL 2 TIMES DAILY PRN
COMMUNITY

## 2021-10-27 RX ORDER — VENLAFAXINE HYDROCHLORIDE 75 MG/1
75 CAPSULE, EXTENDED RELEASE ORAL AT BEDTIME
Status: DISCONTINUED | OUTPATIENT
Start: 2021-10-27 | End: 2021-10-28

## 2021-10-27 RX ORDER — MAGNESIUM OXIDE 400 MG/1
400 TABLET ORAL 2 TIMES DAILY
Status: COMPLETED | OUTPATIENT
Start: 2021-10-27 | End: 2021-10-29

## 2021-10-27 RX ORDER — CLONAZEPAM 0.5 MG/1
0.5 TABLET ORAL
Status: DISCONTINUED | OUTPATIENT
Start: 2021-10-27 | End: 2021-10-27

## 2021-10-27 RX ORDER — ACETAMINOPHEN 650 MG/1
650 SUPPOSITORY RECTAL EVERY 6 HOURS PRN
Status: DISCONTINUED | OUTPATIENT
Start: 2021-10-27 | End: 2021-10-28

## 2021-10-27 RX ORDER — ASPIRIN 81 MG/1
81 TABLET ORAL DAILY
Status: DISCONTINUED | OUTPATIENT
Start: 2021-10-27 | End: 2021-11-02 | Stop reason: HOSPADM

## 2021-10-27 RX ORDER — LIDOCAINE 40 MG/G
CREAM TOPICAL
Status: DISCONTINUED | OUTPATIENT
Start: 2021-10-27 | End: 2021-11-02 | Stop reason: HOSPADM

## 2021-10-27 RX ORDER — ALBUTEROL SULFATE 0.83 MG/ML
2.5 SOLUTION RESPIRATORY (INHALATION)
Status: DISCONTINUED | OUTPATIENT
Start: 2021-10-27 | End: 2021-10-27 | Stop reason: CLARIF

## 2021-10-27 RX ORDER — ONDANSETRON 2 MG/ML
4 INJECTION INTRAMUSCULAR; INTRAVENOUS EVERY 6 HOURS PRN
Status: DISCONTINUED | OUTPATIENT
Start: 2021-10-27 | End: 2021-11-02 | Stop reason: HOSPADM

## 2021-10-27 RX ORDER — AZITHROMYCIN 500 MG/5ML
500 INJECTION, POWDER, LYOPHILIZED, FOR SOLUTION INTRAVENOUS EVERY 24 HOURS
Status: DISCONTINUED | OUTPATIENT
Start: 2021-10-27 | End: 2021-10-27

## 2021-10-27 RX ORDER — ALPRAZOLAM 0.5 MG
0.5 TABLET ORAL 3 TIMES DAILY PRN
Status: DISCONTINUED | OUTPATIENT
Start: 2021-10-27 | End: 2021-11-02 | Stop reason: HOSPADM

## 2021-10-27 RX ORDER — OMEPRAZOLE 40 MG/1
40 CAPSULE, DELAYED RELEASE ORAL DAILY
COMMUNITY

## 2021-10-27 RX ORDER — NICOTINE 21 MG/24HR
1 PATCH, TRANSDERMAL 24 HOURS TRANSDERMAL DAILY
Status: DISCONTINUED | OUTPATIENT
Start: 2021-10-27 | End: 2021-11-02 | Stop reason: HOSPADM

## 2021-10-27 RX ORDER — CEFTRIAXONE SODIUM 2 G/50ML
2 INJECTION, SOLUTION INTRAVENOUS EVERY 24 HOURS
Status: COMPLETED | OUTPATIENT
Start: 2021-10-27 | End: 2021-11-02

## 2021-10-27 RX ORDER — ALBUTEROL SULFATE 90 UG/1
2 AEROSOL, METERED RESPIRATORY (INHALATION)
Status: DISCONTINUED | OUTPATIENT
Start: 2021-10-27 | End: 2021-11-02 | Stop reason: HOSPADM

## 2021-10-27 RX ORDER — VENLAFAXINE HYDROCHLORIDE 75 MG/1
150 CAPSULE, EXTENDED RELEASE ORAL DAILY
Status: DISCONTINUED | OUTPATIENT
Start: 2021-10-27 | End: 2021-11-02 | Stop reason: HOSPADM

## 2021-10-27 RX ORDER — LORAZEPAM 2 MG/ML
.5-1 INJECTION INTRAMUSCULAR EVERY 4 HOURS PRN
Status: DISCONTINUED | OUTPATIENT
Start: 2021-10-27 | End: 2021-10-28

## 2021-10-27 RX ORDER — ACETAMINOPHEN 325 MG/1
975 TABLET ORAL ONCE
Status: COMPLETED | OUTPATIENT
Start: 2021-10-27 | End: 2021-10-27

## 2021-10-27 RX ADMIN — VENLAFAXINE HYDROCHLORIDE 150 MG: 75 CAPSULE, EXTENDED RELEASE ORAL at 10:35

## 2021-10-27 RX ADMIN — Medication 1 PATCH: at 20:09

## 2021-10-27 RX ADMIN — DEXAMETHASONE 6 MG: 2 TABLET ORAL at 12:56

## 2021-10-27 RX ADMIN — KETOROLAC TROMETHAMINE 15 MG: 15 INJECTION, SOLUTION INTRAMUSCULAR; INTRAVENOUS at 17:03

## 2021-10-27 RX ADMIN — SODIUM CHLORIDE: 9 INJECTION, SOLUTION INTRAVENOUS at 04:37

## 2021-10-27 RX ADMIN — AZITHROMYCIN MONOHYDRATE 250 MG: 500 INJECTION, POWDER, LYOPHILIZED, FOR SOLUTION INTRAVENOUS at 16:01

## 2021-10-27 RX ADMIN — FLUTICASONE FUROATE AND VILANTEROL TRIFENATATE 1 PUFF: 200; 25 POWDER RESPIRATORY (INHALATION) at 12:23

## 2021-10-27 RX ADMIN — LIDOCAINE 5% 1 PATCH: 700 PATCH TOPICAL at 12:56

## 2021-10-27 RX ADMIN — ALPRAZOLAM 0.5 MG: 0.5 TABLET ORAL at 17:03

## 2021-10-27 RX ADMIN — ALPRAZOLAM 0.5 MG: 0.5 TABLET ORAL at 22:14

## 2021-10-27 RX ADMIN — ACETAMINOPHEN 975 MG: 325 TABLET, FILM COATED ORAL at 03:36

## 2021-10-27 RX ADMIN — METOPROLOL SUCCINATE 25 MG: 25 TABLET, EXTENDED RELEASE ORAL at 10:35

## 2021-10-27 RX ADMIN — MAGNESIUM OXIDE TAB 400 MG (241.3 MG ELEMENTAL MG) 400 MG: 400 (241.3 MG) TAB at 22:14

## 2021-10-27 RX ADMIN — ENOXAPARIN SODIUM 40 MG: 40 INJECTION SUBCUTANEOUS at 10:34

## 2021-10-27 RX ADMIN — ASPIRIN 81 MG: 81 TABLET ORAL at 10:35

## 2021-10-27 RX ADMIN — ACETAMINOPHEN 650 MG: 325 TABLET, FILM COATED ORAL at 16:01

## 2021-10-27 RX ADMIN — OLANZAPINE 2.5 MG: 5 TABLET, ORALLY DISINTEGRATING ORAL at 20:00

## 2021-10-27 RX ADMIN — TIOTROPIUM BROMIDE INHALATION SPRAY 1 PUFF: 3.12 SPRAY, METERED RESPIRATORY (INHALATION) at 12:22

## 2021-10-27 RX ADMIN — SODIUM CHLORIDE 1000 ML: 9 INJECTION, SOLUTION INTRAVENOUS at 03:38

## 2021-10-27 RX ADMIN — LISINOPRIL 2.5 MG: 2.5 TABLET ORAL at 10:36

## 2021-10-27 RX ADMIN — KETOROLAC TROMETHAMINE 15 MG: 15 INJECTION, SOLUTION INTRAMUSCULAR; INTRAVENOUS at 22:15

## 2021-10-27 RX ADMIN — VENLAFAXINE HYDROCHLORIDE 75 MG: 75 CAPSULE, EXTENDED RELEASE ORAL at 22:14

## 2021-10-27 RX ADMIN — CEFTRIAXONE SODIUM 2 G: 2 INJECTION, SOLUTION INTRAVENOUS at 10:26

## 2021-10-27 RX ADMIN — ATORVASTATIN CALCIUM 20 MG: 20 TABLET, FILM COATED ORAL at 22:15

## 2021-10-27 RX ADMIN — MAGNESIUM SULFATE HEPTAHYDRATE 2 G: 40 INJECTION, SOLUTION INTRAVENOUS at 00:23

## 2021-10-27 RX ADMIN — REMDESIVIR 200 MG: 100 INJECTION, POWDER, LYOPHILIZED, FOR SOLUTION INTRAVENOUS at 12:55

## 2021-10-27 ASSESSMENT — ACTIVITIES OF DAILY LIVING (ADL)
ADLS_ACUITY_SCORE: 7
ADLS_ACUITY_SCORE: 14
ADLS_ACUITY_SCORE: 7
ADLS_ACUITY_SCORE: 14
ADLS_ACUITY_SCORE: 7
ADLS_ACUITY_SCORE: 7
ADLS_ACUITY_SCORE: 15
ADLS_ACUITY_SCORE: 7
ADLS_ACUITY_SCORE: 7
ADLS_ACUITY_SCORE: 14
ADLS_ACUITY_SCORE: 7
ADLS_ACUITY_SCORE: 14

## 2021-10-27 NOTE — PROGRESS NOTES
Patient has been assessed for Home Oxygen needs. Oxygen readings:    *Pulse oximetry (SpO2) = 86% on room air at rest while awake.    *SpO2 improved to 91% on 1 liters/minute at rest.    *SpO2 = 80% on room air during activity/with exercise.    *SpO2 improved to 90% on 4 liters/minute during activity/with exercise.     Patient was able to ambulate only 5 ft. get out of breath and RT help pt go back to the bed.    Erik Quiñones, RT

## 2021-10-27 NOTE — PHARMACY-ADMISSION MEDICATION HISTORY
Pharmacy -- Admission Medication Reconciliation    Prior to admission (PTA) medications were reviewed and the patient's PTA medication list was updated.    Sources Consulted: Patient phone interview (brief/unreliable), [facility] fill history, [facility] chart notes, [facility] ([place]) fill history, [facility], [facility]    The reliability of this Medication Reconciliation is: Reliability: Borderline reliable/Unreliable    The following significant changes were made:  -Updated patient's preferred outpatient pharmacy  -Added Omeprazole  -Added Tizanidine  -Added Triamcinolone cream  -Added Aquaphor ointment  -Removed Symbicort  -Removed Guaifenesin/codeine  -Removed Lisinopril  -Changed Clonazepam tablet strength, updated dose, directions  -Completed directions for Spiriva  -Clarified directions for Venlafaxine 150 mg and 75 mg (patient to take 225 mg/day)        **Notes:**  -patient very short of breath during interview and difficult to understand. Patient reports she is unsure of medications she takes at home- reports that [facility] list would be what she takes. Also reports that there is no one close to her that would know current meds that she takes.    -patient reports not feeling well- as a result, has not taken any of her meds ~1 week.    -Atorvastatin: no [facility] fill history, but medication listed as active on [facility] list on 8/2/21 office visit. Unclear if patient is supposed to be on or not, so I left on patient's medication list.    -COVID-19 vaccination status: per [facility], no history of receiving any COVID vaccinations.      In addition, the patient's allergies were reviewed with the patient and updated as follows:   Allergies: Latex, Codeine, Gabapentin, Morphine, Nortriptyline, Sucralfate, and Diphenhydramine   **Morphine added to patient's allergy list (nausea)    The pharmacist has reviewed with the patient that all personal medications should be removed from the building or  locked in the belongings safe.  Patient shall only take medications ordered by the physician and administered by the nursing staff.       Medication barriers identified: Yes- patient not feeling well, and has not taken any medications ~1 week as a result.   Medication adherence concerns: Yes, see above note; unclear how patient has been taking her medications otherwise.   Understanding of emergency medications: Unable to assess.    Aramis Arellano [facility], 10/27/2021,  11:57 AM

## 2021-10-27 NOTE — PHARMACY-CONSULT NOTE
Pharmacy- Weight Dose Adjustment    Patient Active Problem List   Diagnosis     Abdominal pain, chronic, right upper quadrant     Cardiomyopathy in diseases classified elsewhere (H)     Controlled substance agreement signed     COPD (chronic obstructive pulmonary disease) (H)     CAD (coronary artery disease)     Depression     Diverticulosis of sigmoid colon     SUSAN (generalized anxiety disorder)     H/O adenomatous polyp of colon     Hypertension     Migraine headache     Pure hypercholesterolemia     Recurrent ventral hernia     Squamous cell carcinoma of skin of left upper extremity, including shoulder     Tobacco use disorder     Vitamin D deficiency     Community acquired pneumonia of right lung     E. coli UTI (urinary tract infection)     Closed fracture of sacrum (H)     Fall at home     Anemia     Hypomagnesemia     Hyponatremia     Acute respiratory failure with hypoxia (H)     Pneumonia due to 2019 novel coronavirus        Relevant Labs:  Recent Labs   Lab Test 10/26/21  2153 12/27/19  1420   WBC 10.3 12.1*   HGB 12.3 11.0*    364        CrCl: 38.6 mL/min    No intake or output data in the 24 hours ending 10/27/21 1126       Per Renal Dose Adjustment Protocol, will adjust:  Lovenox to 30 mg Q24H for BMI < 18.      Will continue to follow and make adjustments accordingly. Thank You.    Alicia Menjivar [facility] ....................  10/27/2021   11:26 AM

## 2021-10-27 NOTE — ED TRIAGE NOTES
ED Nursing Triage Note (General)   ________________________________    Jaida Nieto is a 76 year old Female that presents to triage via EMS for complaints of increased SOB and nausea.  Patient states she was covid positive 2 weeks ago.  Patient also has a hx of COPD and states she does not utilize 02 at home.  On EMS arrival patient was 88% on room air and complaining of SOB.  EMS applied 15L non-rebreather and attempted to wean patient once she was 96%, however, state when removing 02 patient dropped back into the 80's.   Significant symptoms had onset 2 weeks ago.  Patient appears alert behavior.  GCS-15  Airway:intact  Breathing noted as Normal  Action taken: 3      PRE HOSPITAL PRIOR LIVING SITUATION-home

## 2021-10-27 NOTE — PROGRESS NOTES
GFR Estimate   Date Value Ref Range Status   10/26/2021 61 >60 mL/min/1.73m2 Final     Comment:     As of July 11, 2021, eGFR is calculated by the CKD-EPI creatinine equation, without race adjustment. eGFR can be influenced by muscle mass, exercise, and diet. The reported eGFR is an estimation only and is only applicable if the renal function is stable.   12/27/2019 80 >60 mL/min/[1.73_m2] Final   03/09/2018 73 >60 mL/min/1.7m2 Final   03/08/2018 78 >60 mL/min/1.7m2 Final     GFR Estimate If Black   Date Value Ref Range Status   12/27/2019 >90 >60 mL/min/[1.73_m2] Final   03/09/2018 88 >60 mL/min/1.7m2 Final   03/08/2018 >90 >60 mL/min/1.7m2 Final     Creatinine   Date Value Ref Range Status   10/26/2021 0.93 0.60 - 1.20 mg/dL Final   12/27/2019 0.72 0.60 - 1.20 mg/dL Final     1.  Has the patient had a previous reaction to IV contrast? n    2.  Does the patient have kidney disease? n    3.  Is the patient on dialysis? n    If YES to any of these questions, exam will be reviewed with a Radiologist before administering contrast.    1.  Is patient currently taking metformin? n     If NO: Technologist will give the patient normal post CT instructions.     If YES: Technologist will obtain GFR from Lab.    2.  Is GFR is greater than 60? y     If YES: Technologist will give the patient normal post CT instructions.     If NO: Technologist will give the patient METFORMIN post CT instructions.

## 2021-10-27 NOTE — PROGRESS NOTES
IV Contrast- Discharge Instructions After Your CT Scan      The IV contrast you received today will be filtered from your bloodstream by your kidneys during the next 24 hours and pass from the body in urine.  You will not be aware of this process and your urine will not change in color.  To help this process you should drink at least 4 additional glasses of water or juice today.  This reduces stress on your kidneys.    Most contrast reactions are immediate.  Should you develop symptoms of concern after discharge, contact the department at the number below.  After hours you should contact your personal physician.  If you develop breathing distress or wheezing, call 911.      \

## 2021-10-27 NOTE — PROGRESS NOTES
"Clinical Nutrition / Initial Assessment     Reason for Assessment:  low BMI    Assessment:   Client History:  Pt with acute respiratory failure, COVID 19 for past ~2 weeks. Has not been feeling well for ~ 2 weeks, likely has reduced oral intakes in this time frame. Noted wt loss in past 2 months ~9% and BMI less than 20.   Diet Order:  Regular  Oral Intake:  Likely reduced, no intakes yet  Supplement Intake:  Will add Boost/ensure TID on trays  Weight:   Wt Readings from Last 10 Encounters:   10/27/21 46.1 kg (101 lb 11.2 oz)   08/26/21 50.6 kg (111 lb 9.6 oz)   12/27/19 53.5 kg (118 lb)   10/11/18 52.8 kg (116 lb 6.4 oz)   03/12/18 51 kg (112 lb 7 oz)   01/06/16 54.3 kg (119 lb 9.6 oz)   12/22/15 54 kg (119 lb)   11/30/15 54.1 kg (119 lb 3.2 oz)   11/12/15 54.7 kg (120 lb 9.6 oz)   11/04/15 54.3 kg (119 lb 12.8 oz)   Height: 5' 7\"  BMI: Body mass index is 15.93 kg/m .    Estimated nutritional needs based on:  Usual wt:  55 kg / 120 lbs   Estimated energy needs:  1007-3084 kcal/day (25-30 kcal/kg)  Estimated protein needs:  55-66 gm/day (1-1.2 g/kg)  Estimated fluid needs:  4694-9389 ml/day (1 ml/kcal)    Malnutrition Criteria:  (Need to have 2 indicators to qualify recommendation)  Energy Intake:  Acute Severe: </= 50% of estimated energy requirement for >/= 5 days  Interpretation of Weight Loss:  Chronic Severe:   >7.5% in 3 months -likely more recent wt loss with poor intakes x 2 weeks   Physical Findings:  Chronic Body Fat Loss:  severe and Chronic Muscle Mass Loss:  severe  Reduced  Strength:  Not Measured    Recommended Nutrition Diagnosis:   Severe Malnutrition in the context of acute illness or injury - based on AND/ASPEN Clinical Characterstics of Malnutrition May 2012      Nutrition Education: Nutrition education will be provided as appropriate.    Nutrition Diagnosis: Weight:  weight loss related to inadequate energy intakes as evidenced by wt down ~ 9% in past 3 months, likely more acute with " decreased appetite x 2 weeks.    Intervention:  Nutrition Prescription:     Nutrition Intervention(s):Recommended general, healthful diet  1.  Meals and Snacks: small/frequent meals/snacks   2. Medical Food Supplement:boost/ensure TID on trays    Nutrition Goal(s):  1. Pt will consume 50% or more at meals and supplements   2. Pt will not have unplanned wt loss during hospitalization   3. Pt will tolerate diet as ordered    Monitoring and Evaluation:   Food Intake, diet tolerance, weights     Discharge Recommendation:   Nutrition Discharge Planning  recommend supplements on discharge to help maintain healthy weight.     RD will reassess in within 1-5 days or sooner.    Monse Sun RD on 10/27/2021 at 2:21 PM

## 2021-10-27 NOTE — ED PROVIDER NOTES
History     Chief Complaint   Patient presents with     Covid Concern     HPI  Jaida Nieto is a 76 year old female who presents to the emergency department ill-appearing tachypneic tachycardic hypoxic mildly confused she says she has had Covid now for about 2 weeks she is weak and she is not been able to do her daily activities.  Patient has had increasing shortness of breath and nausea.  Also a history of COPD she uses oxygen at home but that was recently discontinued.  No abdominal pain diarrhea constipation rashes and she does not have any trauma.    Allergies:  Allergies   Allergen Reactions     Latex Rash     Codeine Other (See Comments) and Nausea and Vomiting     dizzy     Gabapentin Other (See Comments) and Unknown     Felt like being plugged into light socket after taking 1 pill  Lethargy and sweaty     Nortriptyline      Other reaction(s): Emotional Disturbance  Made her feel funny     Sucralfate Nausea     Diphenhydramine      Other reaction(s): Irritability  Hyper, feels awful on this       Problem List:    Patient Active Problem List    Diagnosis Date Noted     Anemia 03/09/2018     Priority: Medium     Hypomagnesemia 03/09/2018     Priority: Medium     Community acquired pneumonia of right lung 03/08/2018     Priority: Medium     E. coli UTI (urinary tract infection) 03/08/2018     Priority: Medium     Closed fracture of sacrum (H) 03/08/2018     Priority: Medium     Fall at home 03/08/2018     Priority: Medium     Abdominal pain, chronic, right upper quadrant 11/30/2015     Priority: Medium     Controlled substance agreement signed 11/30/2015     Priority: Medium     Squamous cell carcinoma of skin of left upper extremity, including shoulder 11/12/2015     Priority: Medium     COPD (chronic obstructive pulmonary disease) (H) 08/05/2015     Priority: Medium     Recurrent ventral hernia 02/14/2014     Priority: Medium     SUSAN (generalized anxiety disorder) 01/08/2014     Priority: Medium     Vitamin  D deficiency 10/14/2013     Priority: Medium     Hypertension 08/30/2013     Priority: Medium     H/O adenomatous polyp of colon 08/26/2013     Priority: Medium     Depression 06/10/2013     Priority: Medium     Diverticulosis of sigmoid colon 06/07/2013     Priority: Medium     CAD (coronary artery disease) 01/08/2007     Priority: Medium     mild CAD 9-06; Stress CM, initial EF 15%.       Cardiomyopathy in diseases classified elsewhere (H) 09/15/2006     Priority: Medium     Stress CM/[place]  initial EF15%. 9-06  F/U EF 55%.       Tobacco use disorder 09/15/2006     Priority: Medium     Overview:   1PPD       Migraine headache 09/13/2006     Priority: Medium     Pure hypercholesterolemia 09/13/2006     Priority: Medium        Past Medical History:    Past Medical History:   Diagnosis Date     Abdominal pain, chronic, right upper quadrant 11/30/2015     Acute biliary pancreatitis 10/5/2015     CAD (coronary artery disease) 01/2007     Cardiomyopathy in disease classified elsewhere (H) 09/2006     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     Other specified congenital malformations of kidney (CODE) 06/28/2011     S/P repair of recurrent ventral hernia 3/11/2014     Squamous cell carcinoma of skin of left upper limb, including shoulder 11/2015     Type 2 diabetes mellitus without complications (H)      Vitamin D deficiency 10/14/2013       Past Surgical History:    Past Surgical History:   Procedure Laterality Date     ARTHROPLASTY HIP      left     ARTHROSCOPY KNEE      left knee surgical repair     CHOLECYSTECTOMY  09/03/2013    open      COLONOSCOPY  08/26/2013 8/26/13,F/U 2018     COLOSTOMY  06/04/2013    Sigmoid Colostomy/Brian's, removal mesh     COLOSTOMY  08/27/2013    Colostomy closure, appy 29 mm EEA     ELBOW SURGERY      left elbow repair surgical     HERNIA REPAIR      x 2 with mesh     LAPAROTOMY EXPLORATORY  09/03/2013    9/3/13,lysis of adhesions     OTHER SURGICAL HISTORY  03/10/2014    with mesh and bilateral component separation       Family History:    Family History   Problem Relation Age of Onset     Heart Disease Mother         Heart Disease,Valvular Heart Surgery     Colon Cancer Father         Cancer-colon     Colon Cancer Sister         Cancer-colon       Social History:  Marital Status:   [5]  Social History     Tobacco Use     Smoking status: Current Every Day Smoker     Packs/day: 1.00     Years: 42.00     Pack years: 42.00     Types: Cigarettes     Smokeless tobacco: Never Used   Vaping Use     Vaping Use: Never used   Substance Use Topics     Alcohol use: No     Alcohol/week: 0.0 standard drinks     Drug use: Never        Medications:    acetaminophen (TYLENOL) 325 MG tablet  albuterol (2.5 MG/3ML) 0.083% neb solution  albuterol (VENTOLIN HFA) 108 (90 BASE) MCG/ACT Inhaler  aspirin EC 81 MG EC tablet  atorvastatin (LIPITOR) 20 MG tablet  budesonide-formoterol (SYMBICORT) 160-4.5 MCG/ACT Inhaler  clonazePAM (KLONOPIN) 0.5 MG tablet  guaiFENesin-codeine (ROBITUSSIN AC) 100-10 MG/5ML solution  lisinopril (PRINIVIL/ZESTRIL) 5 MG tablet  metoprolol succinate (TOPROL-XL) 25 MG 24 hr tablet  Nutritional Supplements (BOOST HIGH PROTEIN) LIQD  order for DME  order for DME  Respiratory Therapy Supplies (NEBULIZER COMPRESSOR) KIT  Respiratory Therapy Supplies (NEBULIZER COMPRESSOR) KIT  tiotropium (SPIRIVA) 18 MCG inhaled capsule  venlafaxine (EFFEXOR-XR) 150 MG 24 hr capsule  venlafaxine (EFFEXOR-XR) 75 MG 24 hr capsule          Review of Systems   Please see HPI for pertinent positives and negatives.  All other  systems reviewed and found to be negative.      Physical Exam   BP: 113/66  Pulse: 92  Temp: 97.6  F (36.4  C)  Resp: 21  Weight: 50.3 kg (111 lb)  SpO2: 94 %    Vitals:    10/26/21 2052 10/26/21 2153   BP: 113/66    Pulse: 92    Resp: 21    Temp: 97.6  F (36.4  C)    TempSrc: Tympanic    SpO2: 94% 94%   Weight: 50.3 kg (111 lb)          Physical Exam   Exam:  Constitutional: Ill-appearing  Head: Normocephalic.    Neck: Neck supple.    ENT: ENT exam normal, no neck nodes or sinus tenderness  Cardiovascular:   RRR. No murmurs, clicks gallops or rub  Respiratory:  Lungs crackles throughout  Gastrointestinal: Abdomen soft, non-tender. BS normal. No masses, organomegaly  : Deferred  Musculoskeletal: extremities normal- no gross deformities noted, gait normal and normal muscle tone  Skin: no suspicious lesions or rashes  Neurologic: Gait normal. Reflexes normal and symmetric. Sensation grossly WNL.  Psychiatric: mentation appears mildly confused        ED Course        Procedures      Results for orders placed or performed during the hospital encounter of 10/26/21 (from the past 24 hour(s))   XR Chest Port 1 View    Narrative    PROCEDURE INFORMATION:   Exam: XR Chest   Exam date and time: 10/26/2021 9:24 PM   Age: 76 years old   Clinical indication: Shortness of breath; Additional info: Dyspnea/sob     TECHNIQUE:   Imaging protocol: XR of the chest.   Views: 1 view.     COMPARISON:   CR XR CHEST 2 VW 3/8/2018 3:09 PM     FINDINGS:   Lungs: Hyperaeration. Increased opacity at both lung bases.   Pleural spaces: Bilateral apical pleural thickening.   Heart/Mediastinum: Unremarkable. No cardiomegaly.   Vasculature: Tortuous calcified aorta. Tortuous calcified aorta.   Bones/joints: Osteopenia.       Impression    IMPRESSION:   1. COPD.   2. Bibasilar pulmonary infiltrates.   3. Old granulomatous disease.     THIS DOCUMENT HAS BEEN ELECTRONICALLY SIGNED BY MICHELLE TOPETE MD   CBC with platelets differential    Narrative     The following orders were created for panel order CBC with platelets differential.  Procedure                               Abnormality         Status                     ---------                               -----------         ------                     CBC with platelets and d...[[id number]]  Abnormal            Final result                 Please view results for these tests on the individual orders.   Comprehensive metabolic panel   Result Value Ref Range    Sodium 126 (L) 134 - 144 mmol/L    Potassium 3.7 3.5 - 5.1 mmol/L    Chloride 108 (H) 98 - 107 mmol/L    Carbon Dioxide (CO2) 28 21 - 31 mmol/L    Anion Gap <1 (L) 3 - 14 mmol/L    Urea Nitrogen 30 (H) 7 - 25 mg/dL    Creatinine 0.93 0.60 - 1.20 mg/dL    Calcium 9.3 8.6 - 10.3 mg/dL    Glucose 109 (H) 70 - 105 mg/dL    Alkaline Phosphatase 70 34 - 104 U/L    AST 26 13 - 39 U/L    ALT 15 7 - 52 U/L    Protein Total 6.8 6.4 - 8.9 g/dL    Albumin 3.3 (L) 3.5 - 5.7 g/dL    Bilirubin Total 0.5 0.3 - 1.0 mg/dL    GFR Estimate 61 >60 mL/min/1.73m2   Magnesium   Result Value Ref Range    Magnesium 1.4 (L) 1.9 - 2.7 mg/dL   Lactic acid whole blood   Result Value Ref Range    Lactic Acid 1.2 0.7 - 2.0 mmol/L   Troponin I   Result Value Ref Range    Troponin I 8.0 0.0 - 34.0 pg/mL   Lactate Dehydrogenase   Result Value Ref Range    Lactate Dehydrogenase 215 140 - 271 U/L   Phosphorus   Result Value Ref Range    Phosphorus 3.0 2.5 - 5.0 mg/dL   Procalcitonin   Result Value Ref Range    Procalcitonin 1.82 <5.00 ng/mL   NT pro BNP   Result Value Ref Range    N terminal Pro BNP Inpatient 107 (H) 0 - 100 pg/mL   D dimer quantitative   Result Value Ref Range    D-Dimer Quantitative 2.03 (H) 0.00 - 0.50 ug/mL FEU    Narrative    This D-dimer assay is intended for use in conjunction with a clinical pretest probability assessment model to exclude pulmonary embolism (PE) and deep venous thrombosis (DVT) in outpatients suspected of PE or DVT. The cut-off value is 0.50 ug/mL  FEU.   CBC with platelets and differential   Result Value Ref Range    WBC Count 10.3 4.0 - 11.0 10e3/uL    RBC Count 4.25 3.80 - 5.20 10e6/uL    Hemoglobin 12.3 11.7 - 15.7 g/dL    Hematocrit 37.1 35.0 - 47.0 %    MCV 87 78 - 100 fL    MCH 28.9 26.5 - 33.0 pg    MCHC 33.2 31.5 - 36.5 g/dL    RDW 13.9 10.0 - 15.0 %    Platelet Count 407 150 - 450 10e3/uL    % Neutrophils 82 %    % Lymphocytes 9 %    % Monocytes 8 %    % Eosinophils 0 %    % Basophils 0 %    % Immature Granulocytes 1 %    NRBCs per 100 WBC 0 <1 /100    Absolute Neutrophils 8.5 (H) 1.6 - 8.3 10e3/uL    Absolute Lymphocytes 0.9 0.8 - 5.3 10e3/uL    Absolute Monocytes 0.8 0.0 - 1.3 10e3/uL    Absolute Eosinophils 0.0 0.0 - 0.7 10e3/uL    Absolute Basophils 0.0 0.0 - 0.2 10e3/uL    Absolute Immature Granulocytes 0.1 (H) <=0.0 10e3/uL    Absolute NRBCs 0.0 10e3/uL   Blood gas arterial and oxyhgb   Result Value Ref Range    pH Arterial 7.48 (H) 7.35 - 7.45    pCO2 Arterial 34 (L) 35 - 45 mm Hg    pO2 Arterial 65 (L) 80 - 105 mm Hg    Bicarbonate Arterial 25 21 - 28 mmol/L    Oxyhemoglobin Arterial 91 (L) 92 - 100 %    Base Excess/Deficit (+/-) 1.7 -9.0 - 1.8 mmol/L    FIO2 5        Medications   sodium chloride (PF) 0.9% PF flush 3 mL (has no administration in time range)   sodium chloride (PF) 0.9% PF flush 3 mL (3 mLs Intracatheter Given 10/26/21 2135)   cefTRIAXone IN D5W (ROCEPHIN) intermittent infusion 2 g (has no administration in time range)   azithromycin 500 mg (ZITHROMAX) in 0.9% NaCl 250 mL intermittent infusion 500 mg (has no administration in time range)   magnesium sulfate 2 g in water intermittent infusion (has no administration in time range)   albuterol (PROAIR HFA/PROVENTIL HFA/VENTOLIN HFA) 108 (90 Base) MCG/ACT inhaler 6 puff (6 puffs Inhalation Given 10/26/21 2153)   dexamethasone PF (DECADRON) injection 10 mg (10 mg Intravenous Given 10/26/21 2135)       Assessments & Plan (with Medical Decision Making)     There is a high probability  of clinically significant deterioration in this patient's condition which required the highest level of my preparedness to intervene urgently.    I have reviewed the nursing notes.    I have reviewed the findings, diagnosis, plan and need for follow up with the patient.  Differential diagnosis at this point include: asthma, COPD exacerbation, bronchitis, pulmonary edema, acute coronary syndromes, pulmonary embolism, pneumonia, pneumothorax,  Pleasant female who is 76 years old comes in with hypoxic respiratory failure secondary to COVID-19.  It appears that she does have also Covid pneumonia.  Cannot rule out bacterial source.  She will be treated with antibiotics.  Her other laboratory studies are as noted.  Does have COPD she will be given 10 mg of IV Decadron here in the emergency department and she is on oxygen nasal cannula as high as 6 L/min.  She will have imaging of her chest due to her D-dimer being elevated.  I do not think that she does clinically has a pulmonary embolus but I cannot completely ruled out. I think this is all related to her Covid pneumonia.  She was found to have hypomagnesemia and she received 2 g of magnesium IV.  Patient is clinically dry her hyponatremia could be secondary to hypovolemia and her COVID-19..  I explained my diagnostic considerations and recommendations and the patient voiced an understanding and was in agreement with the treatment plan. All questions were answered. We discussed potential side effects of any prescribed or recommended therapies, as well as expectations for response to treatments.  Patient will be signed out to the provider at the end of my shift.  Please refer to his report and final disposition.  Patient most likely needs to be transferred to another facility when beds open up or will be able to stay here at [facility].  There are no beds here at this time.    New Prescriptions    No medications on file     Aggregate Critical Care Time is 35 minutes.  This was  the time seeing the patient at the bedside while the patient was critical.  My time did not include any pertinent procedures or activities that did not contribute to the patient's care while the patient was critical.        Final diagnoses:   Acute respiratory failure with hypoxia (H)   Pneumonia due to 2019 novel coronavirus   Hypomagnesemia   Hyponatremia       10/26/2021   [facility]     Rom Samayoa PA-C  10/26/21 [id number]

## 2021-10-27 NOTE — PLAN OF CARE
BP (!) 145/50   Pulse 71   Temp 97.8  F (36.6  C) (Tympanic)   Resp 24   Wt 46.1 kg (101 lb 11.2 oz)   SpO2 96%   BMI 16.41 kg/m      Admission Note    Data:  Jaida Nieto admitted to [facility] from home at 0800.      Action:   has been notified of admission. Pt oriented to unit, call light in reach.     Response:  Patient tolerated transfer well.  Roopa Gregory RN.............................10/27/2021 8:28 AM

## 2021-10-27 NOTE — PROGRESS NOTES
RCAT completed.  Patient is currently on 6 LPM sat in the low 90s.  Breath sounds are clear and diminished throughout.

## 2021-10-27 NOTE — PROGRESS NOTES
Received intake call for home oxygen at 4:33 AM. Reviewed patient's chart:   4:50 AM - Attempted to reach Roberto to advise we are missing the [id number] dot phrase, SATs, and home oxygen order. No answer on first attempt.    4:58 AM - Spoke with Roberto and was told that home oxygen order has been cancelled at this time. Patient may be admitted.

## 2021-10-27 NOTE — H&P
[facility]    History and Physical - Hospitalist Service       Date of Admission:  10/26/2021    Assessment & Plan      Jaida Nieto is a 76 year old female admitted on 10/26/2021. She was diagnosed with Covid on October 16.  She presents with worsening shortness of breath and hypoxia to the emergency department.    Principal Problem:    Acute respiratory failure with hypoxia (H)  Assessment: present on admission, secondary to Covid, possible community-acquired pneumonia and severe COPD.    Plan: Plan as detailed below with supplemental oxygen therapy.  We will also treat with ceftriaxone and azithromycin for potential concurrent bacterial pneumonia.      Pneumonia due to 2019 novel coronavirus  Assessment: Present on admission, diagnosed October 16.  She has not been treated.  She is hypoxic.  Plan: Remdesivir and dexamethasone day 1, treat with Lovenox.    Active Problems:    COPD (chronic obstructive pulmonary disease) (H)  Assessment: Severe COPD at baseline.  She is not O2 dependent at baseline.  Plan: Continue long-acting bronchodilator and steroid therapy, Spiriva.      Hypertension  Assessment: Chronic  Plan: Continue lisinopril and metoprolol      Hypomagnesemia  Assessment: Present on admission  Plan: Replace      Hyponatremia  Assessment: Present on admission  Plan: Monitor, recheck in the morning       Diet: Combination Diet Regular Diet Adult    DVT Prophylaxis: Enoxaparin (Lovenox) SQ  Llamas Catheter: Not present  Central Lines: None  Code Status: Full Code      Disposition Plan   Expected discharge:  3-4 days recommended to prior living arrangement once antibiotic plan established.     The patient's care was discussed with the Patient.    Valdemar Perez MD  [facility]  Securely message with the Vocera Web Console (learn more here)  Text page via [employer] Paging/Directory        ______________________________________________________________________    Chief  Complaint   Dyspnea, weak    History is obtained from the patient    History of Present Illness   Jaida Nieto is a 76 year old female who has severe COPD at baseline and is unvaccinated for COVID-19 presents to the emergency department with tachypnea and hypoxia.  She was diagnosed with Covid on October 16 and not treated with anything.  She has noticed progressive dyspnea on exertion and now dyspnea at rest since that time.  She has trouble doing activities around her home.  She is not O2 dependent.    In the emergency department her O2 sats are lower than 90% on room air.  She requires 1 to 2 L to maintain oxygen saturations above 90%.  She has significant dyspnea on exertion.  A CT of the chest shows patchy multifocal pneumonia consistent with Covid, she does not have a pulmonary embolism.     Review of Systems    CONSTITUTIONAL:POSITIVE  for fatigue and malaise  INTEGUMENTARY/SKIN: NEGATIVE for worrisome rashes, moles or lesions  EYES: NEGATIVE for vision changes or irritation  ENT/MOUTH: NEGATIVE for ear, mouth and throat problems  RESP:POSITIVE for cough-non productive, dyspnea on exertion and SOB/dyspnea  CV: POSITIVE for dyspnea on exertion  GI: POSITIVE for poor appetite  : NEGATIVE for frequency, dysuria, or hematuria  MUSCULOSKELETAL: NEGATIVE for significant arthralgias or myalgia  NEURO: POSITIVE for weakness   ENDOCRINE: NEGATIVE for temperature intolerance, skin/hair changes  HEME: NEGATIVE for bleeding problems  PSYCHIATRIC: NEGATIVE for changes in mood or affect    Past Medical History    I have reviewed this patient's medical history and updated it with pertinent information if needed.   Past Medical History:   Diagnosis Date     Abdominal pain, chronic, right upper quadrant 11/30/2015     Acute biliary pancreatitis 10/5/2015     CAD (coronary artery disease) 01/2007    mild CAD 9-06; Stress CM, initial EF 15%.     Cardiomyopathy in disease classified elsewhere (H) 09/2006    Stress  CM/[place] initial EF15%. 9-06 F/U EF 55%.     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  Past Surgical History:   Procedure Laterality Date     ARTHROPLASTY HIP      left     ARTHROSCOPY KNEE      left knee surgical repair     CHOLECYSTECTOMY  09/03/2013    open     COLONOSCOPY  08/26/2013 8/26/13,F/U 2018     COLOSTOMY  06/04/2013    Sigmoid Colostomy/Brian's, removal mesh     COLOSTOMY  08/27/2013    Colostomy closure, appy 29 mm EEA     ELBOW SURGERY      left elbow repair surgical     HERNIA REPAIR      x 2 with mesh     LAPAROTOMY EXPLORATORY  09/03/2013    9/3/13,lysis of adhesions     OTHER SURGICAL HISTORY  03/10/2014    with mesh and bilateral component separation       Social History   I have reviewed this patient's social history and updated it with pertinent information if needed.  Social History     Tobacco Use     Smoking status: Current Every Day Smoker     Packs/day: 1.00     Years: 42.00     Pack years: 42.00     Types: Cigarettes     Smokeless tobacco: Never Used   Vaping Use     Vaping Use: Never used   Substance Use Topics     Alcohol use: No      Alcohol/week: 0.0 standard drinks     Drug use: Never       Family History   I have reviewed this patient's family history and updated it with pertinent information if needed.  Family History   Problem Relation Age of Onset     Heart Disease Mother         Heart Disease,Valvular Heart Surgery     Colon Cancer Father         Cancer-colon     Colon Cancer Sister         Cancer-colon       Prior to Admission Medications   Prior to Admission Medications   Prescriptions Last Dose Informant Patient Reported? Taking?   Nutritional Supplements (BOOST HIGH PROTEIN) LIQD   Yes No   Respiratory Therapy Supplies (NEBULIZER COMPRESSOR) KIT  Pharmacy Yes No   Sig: Nebulizer machine and mask/tubing   Patient not taking: Reported on 8/26/2021   Respiratory Therapy Supplies (NEBULIZER COMPRESSOR) KIT  Pharmacy Yes No   Sig: Nebulizer, neb kit, neb cup and mask.  Medication: Albuterol  For home use. Length of need  for Medicare patients: 99   Patient not taking: Reported on 8/26/2021   acetaminophen (TYLENOL) 325 MG tablet   No No   Sig: Take 2 tablets (650 mg) by mouth 4 times daily   Patient not taking: Reported on 8/26/2021   albuterol (2.5 MG/3ML) 0.083% neb solution  Pharmacy Yes No   Sig: Inhale 1 vial into the lungs every 4 hours as needed    Patient not taking: Reported on 8/26/2021   albuterol (VENTOLIN HFA) 108 (90 BASE) MCG/ACT Inhaler  Pharmacy Yes No   Sig: Inhale 1-2 puffs into the lungs every 4 hours as needed    aspirin EC 81 MG EC tablet  Self Yes No   Sig: Take 81 mg by mouth daily    atorvastatin (LIPITOR) 20 MG tablet  Pharmacy Yes No   Sig: Take 20 mg by mouth At Bedtime    Patient not taking: Reported on 8/26/2021   budesonide-formoterol (SYMBICORT) 160-4.5 MCG/ACT Inhaler  Pharmacy Yes No   Sig: Inhale 2 puffs into the lungs daily    clonazePAM (KLONOPIN) 0.5 MG tablet  Self Yes No   Sig: Take 0.5 mg by mouth daily . May taken an additional tablet at bedtime as needed   guaiFENesin-codeine (ROBITUSSIN AC)  100-10 MG/5ML solution   No No   Sig: Take 5-10 mLs by mouth every 4 hours as needed for cough   Patient not taking: Reported on 8/26/2021   lisinopril (PRINIVIL/ZESTRIL) 5 MG tablet  Pharmacy Yes No   Sig: Take 2.5 mg by mouth daily   Patient not taking: Reported on 8/26/2021   metoprolol succinate (TOPROL-XL) 25 MG 24 hr tablet  Pharmacy Yes No   Sig: Take 25 mg by mouth daily   order for DME  Pharmacy Yes No   Sig: Hospital bed for recent abdominal surgery   order for DME   No No   Sig: Equipment being ordered: Walker 4 wheels  Treatment Diagnosis: sacral fracture  PETE: 6 months   Patient not taking: Reported on 8/26/2021   tiotropium (SPIRIVA) 18 MCG inhaled capsule   Yes No   Sig: Inhale 1 capsule into the lungs   venlafaxine (EFFEXOR-XR) 150 MG 24 hr capsule  Pharmacy Yes No   Sig: Take 150 mg by mouth daily    venlafaxine (EFFEXOR-XR) 75 MG 24 hr capsule  Pharmacy Yes No   Sig: Take 75 mg by mouth At Bedtime       Facility-Administered Medications: None     Allergies   Allergies   Allergen Reactions     Latex Rash     Codeine Other (See Comments) and Nausea and Vomiting     dizzy     Gabapentin Other (See Comments) and Unknown     Felt like being plugged into light socket after taking 1 pill  Lethargy and sweaty     Nortriptyline      Other reaction(s): Emotional Disturbance  Made her feel funny     Sucralfate Nausea     Diphenhydramine      Other reaction(s): Irritability  Hyper, feels awful on this       Physical Exam   Vital Signs: Temp: 97.8  F (36.6  C) Temp src: Tympanic BP: (!) 145/50 Pulse: 71   Resp: 24 SpO2: 96 % O2 Device: Nasal cannula Oxygen Delivery: 1 LPM  Weight: 101 lbs 11.2 oz    Constitutional: In no apparent distress  Eyes: pupils reactive, extraocular movements intact. Anicteric sclera.   HEENT: TM's normal, oropharynx nonerythematous. Neck supple, no JVD.  Respiratory: bilateral crackles noted, no wheezes.  Cardiovascular: regular, no murmur. No lower extremity edema.  GI: soft,  non-tender, bowel sounds present.  Lymph/Hematologic: no cervical or supraclavicular LAD.  Genitourinary: deferred  Skin: no rashes, or sores  Musculoskeletal: no joint erythema or swelling  Neurologic: cranial nerves symmetric. Neuro exam nonfocal  Psychiatric: alert and oriented x3. Interactive.       Data   Data reviewed today: I reviewed all medications, new labs and imaging results over the last 24 hours. I personally reviewed the chest CT image(s) showing no PE, but multifocal pneumonia.    Recent Labs   Lab 10/26/21  2153   WBC 10.3   HGB 12.3   MCV 87      *   POTASSIUM 3.7   CHLORIDE 108*   CO2 28   BUN 30*   CR 0.93   ANIONGAP <1*   DARRIUS 9.3   *   ALBUMIN 3.3*   PROTTOTAL 6.8   BILITOTAL 0.5   ALKPHOS 70   ALT 15   AST 26     Recent Results (from the past 24 hour(s))   XR Chest Port 1 View    Narrative    PROCEDURE INFORMATION:   Exam: XR Chest   Exam date and time: 10/26/2021 9:24 PM   Age: 76 years old   Clinical indication: Shortness of breath; Additional info: Dyspnea/sob     TECHNIQUE:   Imaging protocol: XR of the chest.   Views: 1 view.     COMPARISON:   CR XR CHEST 2 VW 3/8/2018 3:09 PM     FINDINGS:   Lungs: Hyperaeration. Increased opacity at both lung bases.   Pleural spaces: Bilateral apical pleural thickening.   Heart/Mediastinum: Unremarkable. No cardiomegaly.   Vasculature: Tortuous calcified aorta. Tortuous calcified aorta.   Bones/joints: Osteopenia.       Impression    IMPRESSION:   1. COPD.   2. Bibasilar pulmonary infiltrates.   3. Old granulomatous disease.     THIS DOCUMENT HAS BEEN ELECTRONICALLY SIGNED BY MICHELLE TOPETE MD   CT Chest Pulmonary Embolism w Contrast    Narrative    PROCEDURE INFORMATION:   Exam: CTA Chest With Contrast   Exam date and time: 10/26/2021 11:06 PM   Age: 76 years old   Clinical indication: Shortness of breath; Additional info: Covid-19 positive     TECHNIQUE:   Imaging protocol: Computed tomographic angiography of the chest with  contrast.   3D rendering (Not supervised by radiologist): MIP and/or 3D reconstructed   images were created by the technologist.   Radiation optimization: All CT scans at this facility use at least one of these   dose optimization techniques: automated exposure control; mA and/or kV   adjustment per patient size (includes targeted exams where dose is matched to   clinical indication); or iterative reconstruction.   Contrast material: ISOVUE 370; Contrast volume: 100 ml; Contrast route:   INTRAVENOUS (IV);      COMPARISON:   CR XR CHEST PORT 1 VIEW 10/26/2021 9:28 PM     FINDINGS:   Pulmonary arteries: Normal. No pulmonary emboli.   Aorta: Unremarkable. No aortic aneurysm. No aortic dissection.     Lungs: There is multifocal ground-glass opacification in all lobes of the   bilateral lungs. This has a peripheral predominance.   Pleural spaces: Unremarkable. No pneumothorax. No pleural effusion.   Heart: Cardiomegaly is identified.   Lymph nodes: Unremarkable. No enlarged lymph nodes.     Bones/joints: There is old deformity of the T12 vertebral body.Degenerative   change is identified in the spine.   Soft tissues: Unremarkable.         Impression    IMPRESSION:   There is no evidence for a pulmonary artery embolus.     There is multifocal disease in the bilateral lungs consistent with the given   history of Covid 19 pneumonia.    THIS DOCUMENT HAS BEEN ELECTRONICALLY SIGNED BY CARLOS QUILES MD

## 2021-10-28 LAB
ANION GAP SERPL CALCULATED.3IONS-SCNC: 9 MMOL/L (ref 3–14)
ATRIAL RATE - MUSE: 88 BPM
BASE EXCESS BLDA CALC-SCNC: 0.2 MMOL/L (ref -9–1.8)
BUN SERPL-MCNC: 45 MG/DL (ref 7–25)
CALCIUM SERPL-MCNC: 9.3 MG/DL (ref 8.6–10.3)
CHLORIDE BLD-SCNC: 108 MMOL/L (ref 98–107)
CO2 SERPL-SCNC: 25 MMOL/L (ref 21–31)
CREAT SERPL-MCNC: 0.93 MG/DL (ref 0.6–1.2)
CRP SERPL-MCNC: 102 MG/L
D DIMER PPP FEU-MCNC: 1.19 UG/ML FEU (ref 0–0.5)
DIASTOLIC BLOOD PRESSURE - MUSE: NORMAL MMHG
ERYTHROCYTE [DISTWIDTH] IN BLOOD BY AUTOMATED COUNT: 14 % (ref 10–15)
FIBRINOGEN PPP-MCNC: 640 MG/DL (ref 170–490)
GFR SERPL CREATININE-BSD FRML MDRD: 61 ML/MIN/1.73M2
GLUCOSE BLD-MCNC: 145 MG/DL (ref 70–105)
HCO3 BLD-SCNC: 25 MMOL/L (ref 21–28)
HCT VFR BLD AUTO: 35.1 % (ref 35–47)
HGB BLD-MCNC: 11.4 G/DL (ref 11.7–15.7)
HOLD SPECIMEN: NORMAL
HOLD SPECIMEN: NORMAL
INTERPRETATION ECG - MUSE: NORMAL
MAGNESIUM SERPL-MCNC: 1.9 MG/DL (ref 1.9–2.7)
MCH RBC QN AUTO: 28.3 PG (ref 26.5–33)
MCHC RBC AUTO-ENTMCNC: 32.5 G/DL (ref 31.5–36.5)
MCV RBC AUTO: 87 FL (ref 78–100)
O2/TOTAL GAS SETTING VFR VENT: 36 %
P AXIS - MUSE: 75 DEGREES
PCO2 BLD: 39 MM HG (ref 35–45)
PH BLD: 7.41 [PH] (ref 7.35–7.45)
PLATELET # BLD AUTO: 474 10E3/UL (ref 150–450)
PO2 BLD: 56 MM HG (ref 80–105)
POTASSIUM BLD-SCNC: 4.8 MMOL/L (ref 3.5–5.1)
PR INTERVAL - MUSE: 118 MS
QRS DURATION - MUSE: 100 MS
QT - MUSE: 360 MS
QTC - MUSE: 435 MS
R AXIS - MUSE: 78 DEGREES
RBC # BLD AUTO: 4.03 10E6/UL (ref 3.8–5.2)
SODIUM SERPL-SCNC: 142 MMOL/L (ref 134–144)
SYSTOLIC BLOOD PRESSURE - MUSE: NORMAL MMHG
T AXIS - MUSE: 85 DEGREES
VENTRICULAR RATE- MUSE: 88 BPM
WBC # BLD AUTO: 12.5 10E3/UL (ref 4–11)

## 2021-10-28 PROCEDURE — 80048 BASIC METABOLIC PNL TOTAL CA: CPT | Performed by: INTERNAL MEDICINE

## 2021-10-28 PROCEDURE — 85379 FIBRIN DEGRADATION QUANT: CPT | Performed by: INTERNAL MEDICINE

## 2021-10-28 PROCEDURE — 250N000011 HC RX IP 250 OP 636: Performed by: INTERNAL MEDICINE

## 2021-10-28 PROCEDURE — 250N000009 HC RX 250: Performed by: INTERNAL MEDICINE

## 2021-10-28 PROCEDURE — 85384 FIBRINOGEN ACTIVITY: CPT | Performed by: INTERNAL MEDICINE

## 2021-10-28 PROCEDURE — 36600 WITHDRAWAL OF ARTERIAL BLOOD: CPT | Performed by: INTERNAL MEDICINE

## 2021-10-28 PROCEDURE — 36415 COLL VENOUS BLD VENIPUNCTURE: CPT | Performed by: INTERNAL MEDICINE

## 2021-10-28 PROCEDURE — 250N000013 HC RX MED GY IP 250 OP 250 PS 637: Performed by: INTERNAL MEDICINE

## 2021-10-28 PROCEDURE — 258N000003 HC RX IP 258 OP 636: Performed by: INTERNAL MEDICINE

## 2021-10-28 PROCEDURE — 85027 COMPLETE CBC AUTOMATED: CPT | Performed by: INTERNAL MEDICINE

## 2021-10-28 PROCEDURE — 250N000012 HC RX MED GY IP 250 OP 636 PS 637: Performed by: INTERNAL MEDICINE

## 2021-10-28 PROCEDURE — 86140 C-REACTIVE PROTEIN: CPT | Performed by: INTERNAL MEDICINE

## 2021-10-28 PROCEDURE — 99232 SBSQ HOSP IP/OBS MODERATE 35: CPT | Performed by: INTERNAL MEDICINE

## 2021-10-28 PROCEDURE — 82803 BLOOD GASES ANY COMBINATION: CPT | Performed by: INTERNAL MEDICINE

## 2021-10-28 PROCEDURE — 83735 ASSAY OF MAGNESIUM: CPT | Performed by: INTERNAL MEDICINE

## 2021-10-28 PROCEDURE — 120N000001 HC R&B MED SURG/OB

## 2021-10-28 RX ORDER — SODIUM CHLORIDE 9 MG/ML
INJECTION, SOLUTION INTRAVENOUS CONTINUOUS
Status: DISCONTINUED | OUTPATIENT
Start: 2021-10-28 | End: 2021-11-02 | Stop reason: HOSPADM

## 2021-10-28 RX ORDER — TRAMADOL HYDROCHLORIDE 50 MG/1
50 TABLET ORAL EVERY 6 HOURS PRN
Status: DISCONTINUED | OUTPATIENT
Start: 2021-10-28 | End: 2021-11-02 | Stop reason: HOSPADM

## 2021-10-28 RX ORDER — NALOXONE HYDROCHLORIDE 0.4 MG/ML
0.2 INJECTION, SOLUTION INTRAMUSCULAR; INTRAVENOUS; SUBCUTANEOUS
Status: DISCONTINUED | OUTPATIENT
Start: 2021-10-28 | End: 2021-11-02 | Stop reason: HOSPADM

## 2021-10-28 RX ORDER — BENZONATATE 100 MG/1
100 CAPSULE ORAL
Status: DISCONTINUED | OUTPATIENT
Start: 2021-10-28 | End: 2021-11-02 | Stop reason: HOSPADM

## 2021-10-28 RX ORDER — FUROSEMIDE 10 MG/ML
20 INJECTION INTRAMUSCULAR; INTRAVENOUS ONCE
Status: COMPLETED | OUTPATIENT
Start: 2021-10-28 | End: 2021-10-28

## 2021-10-28 RX ORDER — NALOXONE HYDROCHLORIDE 0.4 MG/ML
0.4 INJECTION, SOLUTION INTRAMUSCULAR; INTRAVENOUS; SUBCUTANEOUS
Status: DISCONTINUED | OUTPATIENT
Start: 2021-10-28 | End: 2021-11-02 | Stop reason: HOSPADM

## 2021-10-28 RX ORDER — VENLAFAXINE HYDROCHLORIDE 75 MG/1
75 CAPSULE, EXTENDED RELEASE ORAL DAILY
Status: DISCONTINUED | OUTPATIENT
Start: 2021-10-28 | End: 2021-11-01

## 2021-10-28 RX ORDER — GUAIFENESIN/DEXTROMETHORPHAN 100-10MG/5
10 SYRUP ORAL EVERY 4 HOURS PRN
Status: DISCONTINUED | OUTPATIENT
Start: 2021-10-28 | End: 2021-11-02 | Stop reason: HOSPADM

## 2021-10-28 RX ADMIN — CEFTRIAXONE SODIUM 2 G: 2 INJECTION, SOLUTION INTRAVENOUS at 12:10

## 2021-10-28 RX ADMIN — KETOROLAC TROMETHAMINE 15 MG: 15 INJECTION, SOLUTION INTRAMUSCULAR; INTRAVENOUS at 23:36

## 2021-10-28 RX ADMIN — LIDOCAINE 5% 1 PATCH: 700 PATCH TOPICAL at 18:04

## 2021-10-28 RX ADMIN — AZITHROMYCIN MONOHYDRATE 250 MG: 500 INJECTION, POWDER, LYOPHILIZED, FOR SOLUTION INTRAVENOUS at 12:10

## 2021-10-28 RX ADMIN — REMDESIVIR 100 MG: 100 INJECTION, POWDER, LYOPHILIZED, FOR SOLUTION INTRAVENOUS at 18:04

## 2021-10-28 RX ADMIN — SODIUM CHLORIDE: 9 INJECTION, SOLUTION INTRAVENOUS at 12:52

## 2021-10-28 RX ADMIN — METOPROLOL SUCCINATE 25 MG: 25 TABLET, EXTENDED RELEASE ORAL at 12:12

## 2021-10-28 RX ADMIN — ENOXAPARIN SODIUM 30 MG: 100 INJECTION SUBCUTANEOUS at 12:11

## 2021-10-28 RX ADMIN — MAGNESIUM OXIDE TAB 400 MG (241.3 MG ELEMENTAL MG) 400 MG: 400 (241.3 MG) TAB at 23:34

## 2021-10-28 RX ADMIN — ASPIRIN 81 MG: 81 TABLET ORAL at 12:07

## 2021-10-28 RX ADMIN — TRAMADOL HYDROCHLORIDE 50 MG: 50 TABLET, FILM COATED ORAL at 18:40

## 2021-10-28 RX ADMIN — Medication 1 PATCH: at 20:28

## 2021-10-28 RX ADMIN — LISINOPRIL 2.5 MG: 2.5 TABLET ORAL at 12:11

## 2021-10-28 RX ADMIN — FUROSEMIDE 20 MG: 10 INJECTION, SOLUTION INTRAMUSCULAR; INTRAVENOUS at 12:11

## 2021-10-28 RX ADMIN — ATORVASTATIN CALCIUM 20 MG: 20 TABLET, FILM COATED ORAL at 23:34

## 2021-10-28 RX ADMIN — KETOROLAC TROMETHAMINE 15 MG: 15 INJECTION, SOLUTION INTRAMUSCULAR; INTRAVENOUS at 12:14

## 2021-10-28 RX ADMIN — DEXAMETHASONE 6 MG: 2 TABLET ORAL at 12:10

## 2021-10-28 RX ADMIN — LORAZEPAM 0.5 MG: 2 INJECTION, SOLUTION INTRAMUSCULAR; INTRAVENOUS at 04:55

## 2021-10-28 RX ADMIN — ALPRAZOLAM 0.5 MG: 0.5 TABLET ORAL at 12:14

## 2021-10-28 RX ADMIN — DEXTROMETHORPHAN HYDROBROMIDE, GUAIFENESIN 10 ML: 20; 200 SOLUTION ORAL at 13:40

## 2021-10-28 RX ADMIN — VENLAFAXINE HYDROCHLORIDE 75 MG: 75 CAPSULE, EXTENDED RELEASE ORAL at 12:28

## 2021-10-28 RX ADMIN — VENLAFAXINE HYDROCHLORIDE 150 MG: 75 CAPSULE, EXTENDED RELEASE ORAL at 12:13

## 2021-10-28 RX ADMIN — BENZONATATE 100 MG: 100 CAPSULE ORAL at 18:05

## 2021-10-28 RX ADMIN — DEXTROMETHORPHAN HYDROBROMIDE, GUAIFENESIN 10 ML: 20; 200 SOLUTION ORAL at 18:05

## 2021-10-28 RX ADMIN — ACETAMINOPHEN 650 MG: 325 TABLET, FILM COATED ORAL at 13:45

## 2021-10-28 RX ADMIN — KETOROLAC TROMETHAMINE 15 MG: 15 INJECTION, SOLUTION INTRAMUSCULAR; INTRAVENOUS at 04:10

## 2021-10-28 RX ADMIN — MAGNESIUM OXIDE TAB 400 MG (241.3 MG ELEMENTAL MG) 400 MG: 400 (241.3 MG) TAB at 12:11

## 2021-10-28 ASSESSMENT — ACTIVITIES OF DAILY LIVING (ADL)
ADLS_ACUITY_SCORE: 9
ADLS_ACUITY_SCORE: 15
ADLS_ACUITY_SCORE: 9
ADLS_ACUITY_SCORE: 15
ADLS_ACUITY_SCORE: 9
ADLS_ACUITY_SCORE: 9
ADLS_ACUITY_SCORE: 15
ADLS_ACUITY_SCORE: 9
ADLS_ACUITY_SCORE: 15
ADLS_ACUITY_SCORE: 9

## 2021-10-28 NOTE — PLAN OF CARE
"BP (!) 150/67   Pulse 92   Temp 97.8  F (36.6  C) (Tympanic)   Resp (!) 32   Ht 1.702 m (5' 7\")   Wt 46.1 kg (101 lb 11.2 oz)   SpO2 93%   BMI 15.93 kg/m  ]    O2 92-94 on 6L.  Pt anxious throughout shift, pt stated prn ativan has moderate relief.  Continued therapeutic communication, Dyspnea with exertion and increased respirations decreased with ativan.  Roopa Gregory RN.............................10/27/2021 7:11 PM    "

## 2021-10-28 NOTE — PROGRESS NOTES
[facility]    Medicine Progress Note - Hospitalist Service       Date of Admission:  10/26/2021    Assessment & Plan            Jaida Nieto is a 76 year old female admitted on 10/26/2021. She was diagnosed with Covid on October 16.  She presents with worsening shortness of breath and hypoxia to the emergency department.    Principal Problem:    Acute respiratory failure with hypoxia (H)  Assessment: present on admission, secondary to Covid, possible community-acquired pneumonia and severe COPD.  Persistent oxygen need of 4 liters.  Plan: Plan as detailed below with supplemental oxygen therapy.  We will also treat with ceftriaxone and azithromycin for potential concurrent bacterial pneumonia. Diurese with lasix x1.      Pneumonia due to 2019 novel coronavirus  Assessment: Present on admission, diagnosed October 16.  She has not been treated.  She is hypoxic.  Plan: Remdesivir and dexamethasone day 2, treat with Lovenox.    Active Problems:    COPD (chronic obstructive pulmonary disease) (H)  Assessment: Severe COPD at baseline.  She is not O2 dependent at baseline.  Plan: Continue long-acting bronchodilator and steroid therapy, Spiriva.      Hypertension  Assessment: Chronic  Plan: Continue lisinopril and metoprolol      Hypomagnesemia  Assessment: Present on admission  Plan: Replace      Hyponatremia  Assessment: Present on admission, improved.  Plan: Monitor, recheck in the morning    Malnutrition:  - Level of malnutrition: Severe   - Based on: weight loss, reduced intake, mild (or greater) subcutaneous fat loss, mild (or greater) muscle loss         Diet: Combination Diet Regular Diet Adult  Snacks/Supplements Adult: Ensure Enlive; With Meals    DVT Prophylaxis: Enoxaparin (Lovenox) SQ  Llamas Catheter: Not present  Central Lines: None  Code Status: Full Code      Disposition Plan   Expected discharge:  3-4 days recommended to prior living arrangement once COVID treated.     The patient's  care was discussed with the Patient.    Valdemar Perez MD  Hospitalist Service  [facility]  Securely message with the [employer] Web Console (learn more here)  Text page via [employer] Paging/Directory        Interval History   Feels weak, dyspnea.     Data reviewed today: I reviewed all medications, new labs and imaging results over the last 24 hours. I personally reviewed no images or EKG's today.    Physical Exam   Vital Signs: Temp: (!) 96.3  F (35.7  C) Temp src: Tympanic BP: 124/70 Pulse: 80   Resp: 20 SpO2: 96 % O2 Device: Nasal cannula with humidification Oxygen Delivery: 4 LPM  Weight: 101 lbs 11.2 oz  GENERAL: Comfortable, no apparent distress.  CARDIOVASCULAR: regular rate and rhythm, no murmur. No lower extremity edema   RESPIRATORY: Clear to auscultation bilaterally, no wheezes or crackles.  GI: non-tender, non-distended, normal bowel sounds.   SKIN: warm periphery, no rashes      Data   Recent Labs   Lab 10/28/21  0627 10/26/21  2153   WBC 12.5* 10.3   HGB 11.4* 12.3   MCV 87 87   * 407    126*   POTASSIUM 4.8 3.7   CHLORIDE 108* 108*   CO2 25 28   BUN 45* 30*   CR 0.93 0.93   ANIONGAP 9 <1*   DARRIUS 9.3 9.3   * 109*   ALBUMIN  --  3.3*   PROTTOTAL  --  6.8   BILITOTAL  --  0.5   ALKPHOS  --  70   ALT  --  15   AST  --  26     No results found for this or any previous visit (from the past 24 hour(s)).  Medications     - MEDICATION INSTRUCTIONS -         sodium chloride 0.9%  50 mL Intravenous Once     remdesivir  100 mg Intravenous Q24H    And     sodium chloride 0.9%  50 mL Intravenous Q24H     aspirin  81 mg Oral Daily     atorvastatin  20 mg Oral At Bedtime     azithromycin  250 mg Intravenous Q24H     cefTRIAXone  2 g Intravenous Q24H     dexamethasone  6 mg Oral Daily     enoxaparin ANTICOAGULANT  30 mg Subcutaneous Q24H     fluticasone-vilanterol  1 puff Inhalation Daily     furosemide  20 mg Intravenous Once     lidocaine  1 patch Transdermal Q24H     lidocaine    Transdermal Q8H     lisinopril  2.5 mg Oral Daily     magnesium oxide  400 mg Oral BID     metoprolol succinate ER  25 mg Oral Daily     nicotine  1 patch Transdermal Daily     nicotine   Transdermal Q8H     sodium chloride (PF)  3 mL Intracatheter Q8H     tiotropium  1 puff Inhalation Daily     venlafaxine  150 mg Oral Daily     venlafaxine  75 mg Oral Daily

## 2021-10-28 NOTE — PROGRESS NOTES
"This RN at patient bedside and found patient in fetal position in bed, diaphoretic, shaking, and making repeated statements that she didn't want \"them to rape her.\" patient was hiding under blankets and was visibly fearful of staff as they approached. Patient reassured that she was safe and that no one was coming to hurt her. Patient kept looking around the room, frantic and repeated \"don't let them hurt me.\" Patient was clenching onto this writer's hand and was extremely fearful. Patient was tachypneic at this time and had removed her nasal cannula. Patient encouraged to deep breathe. This RN had CNA sit with patient while getting PRN Anxiety meds. PRN Xanax administered at 2214. Patient reassured that they were safe. Patient ambulated to bathroom with staff and cleaned up as she was diaphoretic. Patient sat on edge of bed for awhile after ambulating to bathroom. This RN was unable to get accurate O2 saturation reading on her finger, patient was reading 66%, patient visably SOB and breathless. O2 increased via NC, then HFNC applied. Patient visibly SOB. This RN was able to finally get a good O2 reading on patient's earlobe. O2 titrated back down to 6 LPM. Will continue to monitor.   "

## 2021-10-28 NOTE — PLAN OF CARE
"/75 (BP Location: Right arm)   Pulse 100   Temp 98.5  F (36.9  C) (Tympanic)   Resp 22   Ht 1.702 m (5' 7\")   Wt 46.1 kg (101 lb 11.2 oz)   SpO2 94%   BMI 15.93 kg/m      O2 stable on hfnc 5-8L.  Lung sounds unchanged.  Desat with eating/ talking/ moving but recovers quickly.  Anxious behaviors throughout shift, ativan adequate for decrease in symptoms.  Pt increasingly lethargic and confused throughout shift.  MD notified, orders placed and returned unremarkable.  Decreased in appetite, minimal oral intake today. Will continue to monitor.  Roopa Gregory RN.............................10/28/2021 5:37 PM    "

## 2021-10-28 NOTE — PROGRESS NOTES
:     will continue to follow for discharge planning needs.      YASMANI Mcdaniel on 10/28/2021 at 2:17 PM

## 2021-10-28 NOTE — PLAN OF CARE
Patient admitted for acute respiratory failure with hypoxia. VSS. O2 stable on 4 LPM via NC. Patient extremely anxious with all things. Patient often sits and shakes in bed. C/o pain to back, lidocaine patch and PRN meds effective in providing pain relief. Up with assist x1 and a walker, unsteady and weak. Will continue to monitor. Temp: (!) 96.3  F (35.7  C) Temp src: Tympanic BP: 124/70 Pulse: 80   Resp: 20 SpO2: 96 % O2 Device: Nasal cannula with humidification Oxygen Delivery: 4 LPM      Harriet Tarango RN on 10/28/2021 at 6:21 AM

## 2021-10-29 LAB
ANION GAP SERPL CALCULATED.3IONS-SCNC: 7 MMOL/L (ref 3–14)
BUN SERPL-MCNC: 55 MG/DL (ref 7–25)
CALCIUM SERPL-MCNC: 9.1 MG/DL (ref 8.6–10.3)
CHLORIDE BLD-SCNC: 105 MMOL/L (ref 98–107)
CO2 SERPL-SCNC: 27 MMOL/L (ref 21–31)
CREAT SERPL-MCNC: 1.02 MG/DL (ref 0.6–1.2)
CRP SERPL-MCNC: 59 MG/L
D DIMER PPP FEU-MCNC: 0.92 UG/ML FEU (ref 0–0.5)
ERYTHROCYTE [DISTWIDTH] IN BLOOD BY AUTOMATED COUNT: 14.2 % (ref 10–15)
FIBRINOGEN PPP-MCNC: 522 MG/DL (ref 170–490)
GFR SERPL CREATININE-BSD FRML MDRD: 54 ML/MIN/1.73M2
GLUCOSE BLD-MCNC: 135 MG/DL (ref 70–105)
HCT VFR BLD AUTO: 34.7 % (ref 35–47)
HGB BLD-MCNC: 11.2 G/DL (ref 11.7–15.7)
HOLD SPECIMEN: NORMAL
MCH RBC QN AUTO: 28.9 PG (ref 26.5–33)
MCHC RBC AUTO-ENTMCNC: 32.3 G/DL (ref 31.5–36.5)
MCV RBC AUTO: 89 FL (ref 78–100)
PLATELET # BLD AUTO: 531 10E3/UL (ref 150–450)
POTASSIUM BLD-SCNC: 5 MMOL/L (ref 3.5–5.1)
RBC # BLD AUTO: 3.88 10E6/UL (ref 3.8–5.2)
SODIUM SERPL-SCNC: 139 MMOL/L (ref 134–144)
WBC # BLD AUTO: 11.2 10E3/UL (ref 4–11)

## 2021-10-29 PROCEDURE — 250N000011 HC RX IP 250 OP 636: Performed by: INTERNAL MEDICINE

## 2021-10-29 PROCEDURE — 36415 COLL VENOUS BLD VENIPUNCTURE: CPT | Performed by: INTERNAL MEDICINE

## 2021-10-29 PROCEDURE — 250N000013 HC RX MED GY IP 250 OP 250 PS 637: Performed by: INTERNAL MEDICINE

## 2021-10-29 PROCEDURE — 258N000003 HC RX IP 258 OP 636: Performed by: INTERNAL MEDICINE

## 2021-10-29 PROCEDURE — 80048 BASIC METABOLIC PNL TOTAL CA: CPT | Performed by: INTERNAL MEDICINE

## 2021-10-29 PROCEDURE — 85027 COMPLETE CBC AUTOMATED: CPT | Performed by: INTERNAL MEDICINE

## 2021-10-29 PROCEDURE — 120N000001 HC R&B MED SURG/OB

## 2021-10-29 PROCEDURE — 86140 C-REACTIVE PROTEIN: CPT | Performed by: INTERNAL MEDICINE

## 2021-10-29 PROCEDURE — 85384 FIBRINOGEN ACTIVITY: CPT | Performed by: INTERNAL MEDICINE

## 2021-10-29 PROCEDURE — 85379 FIBRIN DEGRADATION QUANT: CPT | Performed by: INTERNAL MEDICINE

## 2021-10-29 PROCEDURE — 250N000009 HC RX 250: Performed by: INTERNAL MEDICINE

## 2021-10-29 PROCEDURE — 250N000012 HC RX MED GY IP 250 OP 636 PS 637: Performed by: INTERNAL MEDICINE

## 2021-10-29 PROCEDURE — 99232 SBSQ HOSP IP/OBS MODERATE 35: CPT | Performed by: FAMILY MEDICINE

## 2021-10-29 RX ORDER — OLANZAPINE 2.5 MG/1
2.5 TABLET, FILM COATED ORAL EVERY 6 HOURS PRN
Status: DISCONTINUED | OUTPATIENT
Start: 2021-10-29 | End: 2021-11-02 | Stop reason: HOSPADM

## 2021-10-29 RX ADMIN — FLUTICASONE FUROATE AND VILANTEROL TRIFENATATE 1 PUFF: 200; 25 POWDER RESPIRATORY (INHALATION) at 08:41

## 2021-10-29 RX ADMIN — LISINOPRIL 2.5 MG: 2.5 TABLET ORAL at 09:01

## 2021-10-29 RX ADMIN — REMDESIVIR 100 MG: 100 INJECTION, POWDER, LYOPHILIZED, FOR SOLUTION INTRAVENOUS at 16:10

## 2021-10-29 RX ADMIN — ASPIRIN 81 MG: 81 TABLET ORAL at 09:01

## 2021-10-29 RX ADMIN — ALPRAZOLAM 0.5 MG: 0.5 TABLET ORAL at 12:51

## 2021-10-29 RX ADMIN — Medication 1 PATCH: at 09:01

## 2021-10-29 RX ADMIN — BENZONATATE 100 MG: 100 CAPSULE ORAL at 16:11

## 2021-10-29 RX ADMIN — VENLAFAXINE HYDROCHLORIDE 150 MG: 75 CAPSULE, EXTENDED RELEASE ORAL at 09:02

## 2021-10-29 RX ADMIN — BENZONATATE 100 MG: 100 CAPSULE ORAL at 12:51

## 2021-10-29 RX ADMIN — BENZONATATE 100 MG: 100 CAPSULE ORAL at 08:41

## 2021-10-29 RX ADMIN — ENOXAPARIN SODIUM 30 MG: 100 INJECTION SUBCUTANEOUS at 09:01

## 2021-10-29 RX ADMIN — DEXAMETHASONE 6 MG: 2 TABLET ORAL at 12:51

## 2021-10-29 RX ADMIN — CEFTRIAXONE SODIUM 2 G: 2 INJECTION, SOLUTION INTRAVENOUS at 08:41

## 2021-10-29 RX ADMIN — VENLAFAXINE HYDROCHLORIDE 75 MG: 75 CAPSULE, EXTENDED RELEASE ORAL at 09:01

## 2021-10-29 RX ADMIN — METOPROLOL SUCCINATE 25 MG: 25 TABLET, EXTENDED RELEASE ORAL at 09:01

## 2021-10-29 RX ADMIN — AZITHROMYCIN MONOHYDRATE 250 MG: 500 INJECTION, POWDER, LYOPHILIZED, FOR SOLUTION INTRAVENOUS at 09:43

## 2021-10-29 RX ADMIN — LIDOCAINE 5% 1 PATCH: 700 PATCH TOPICAL at 09:00

## 2021-10-29 RX ADMIN — MAGNESIUM OXIDE TAB 400 MG (241.3 MG ELEMENTAL MG) 400 MG: 400 (241.3 MG) TAB at 09:01

## 2021-10-29 RX ADMIN — TIOTROPIUM BROMIDE INHALATION SPRAY 1 PUFF: 3.12 SPRAY, METERED RESPIRATORY (INHALATION) at 08:40

## 2021-10-29 RX ADMIN — ALPRAZOLAM 0.5 MG: 0.5 TABLET ORAL at 21:10

## 2021-10-29 RX ADMIN — ATORVASTATIN CALCIUM 20 MG: 20 TABLET, FILM COATED ORAL at 21:10

## 2021-10-29 RX ADMIN — ALPRAZOLAM 0.5 MG: 0.5 TABLET ORAL at 02:47

## 2021-10-29 RX ADMIN — KETOROLAC TROMETHAMINE 15 MG: 15 INJECTION, SOLUTION INTRAMUSCULAR; INTRAVENOUS at 09:43

## 2021-10-29 ASSESSMENT — ACTIVITIES OF DAILY LIVING (ADL)
ADLS_ACUITY_SCORE: 11

## 2021-10-29 ASSESSMENT — MIFFLIN-ST. JEOR: SCORE: 996.66

## 2021-10-29 NOTE — PROGRESS NOTES
[facility]    Medicine Progress Note - Hospitalist Service       Date of Admission:  10/26/2021    Assessment & Plan           Jaida Nieto is a 76 year-old female admitted on 10/26/2021.  She was diagnosed with COVID on October 16th.  She presents with worsening shortness of breath and hypoxia to the emergency department.    Acute respiratory failure with hypoxia  Present on admission, presumed secondary to COVID-19 infection, though concomitant bacterial pneumonia also a possibility and complicated by underlying severe COPD.   Currently required 6 L/min O2 via nasal cannula.  - COVID treatment with Remdesivir, Dexamethasone, and Lovenox.  - Continue Ceftriaxone and Azithromycin for bacterial pneumonia coverage.  - Continue home inhaler regimen.  - Continue supplemental oxygen.  - Monitor fluid status.    Pneumonia due to 2019 novel coronavirus  Present on admission, diagnosed 10/16.  She had not received any treatment previously.  - Continue Remdesivir and Dexamethasone, day 3.  - Anticoagulation with Lovenox.  - Daily CBC, CRP, D-dimer, and Fibrinogen activity.    COPD (chronic obstructive pulmonary disease)  Severe COPD at baseline.  She is not oxygen dependent at baseline.  - Continue Breo Ellipta and Spiriva.    Hypertension  Chronic.  - Continue home Lisinopril and Metoprolol.    Hypomagnesemia  Mg 1.9 yesterday.  - Continue to monitor and replace per protocol.    Hyponatremia  Resolved.  - Routine monitoring.    Malnutrition  Severe malnutrition based on weight loss, reduced intake, and mild (or greater) subcutaneous fat loss, mild (or greater) muscle loss.  - Snacks and supplements with meals.       Diet: Combination Diet Regular Diet Adult  Snacks/Supplements Adult: Ensure Enlive; With Meals    DVT Prophylaxis: Enoxaparin (Lovenox) SQ  Llamas Catheter: Not present  Central Lines: None  Code Status: Full Code      Disposition Plan   Expected discharge: 3-5 days recommended to prior  "living arrangement once COVID treated and oxygen use < 2 L/min.     The patient's care was discussed with the Patient.    Brandy Mckeon, DO  Hospitalist Service  [facility]    ______________________________________________________________________    Interval History   No acute events overnight.  She continues to feel short of breath.  She has had some chest \"spasms\" which are relieved with massage.  She states that this does not feel similar to previous cardiac pain she has had.  No abdominal pain.  Her appetite has been good.    Data reviewed today: I reviewed all medications, new labs and imaging results over the last 24 hours. I personally reviewed no images or EKG's today.    Physical Exam   Vital Signs: Temp: 97.2  F (36.2  C) Temp src: Tympanic BP: (!) 151/91 Pulse: 73   Resp: 20 SpO2: 91 % O2 Device: High Flow Nasal Cannula (HFNC) Oxygen Delivery: 6 LPM  Weight: 102 lbs 4.8 oz  General Appearance: Alert. No acute distress.  Respiratory: Normal rate and effort. Rales present over lung base bilaterally. NC in place, 6 L/min O2.  Cardiovascular: RRR.  GI: Soft. Non-tender. Non-distended. Normal bowel sounds.  Extremities: No lower extremity swelling or edema.  Psych: Normal mood and affect.    Data   Recent Labs   Lab 10/29/21  0637 10/28/21  0627 10/26/21  2153   WBC 11.2* 12.5* 10.3   HGB 11.2* 11.4* 12.3   MCV 89 87 87   * 474* 407    142 126*   POTASSIUM 5.0 4.8 3.7   CHLORIDE 105 108* 108*   CO2 27 25 28   BUN 55* 45* 30*   CR 1.02 0.93 0.93   ANIONGAP 7 9 <1*   DARRIUS 9.1 9.3 9.3   * 145* 109*   ALBUMIN  --   --  3.3*   PROTTOTAL  --   --  6.8   BILITOTAL  --   --  0.5   ALKPHOS  --   --  70   ALT  --   --  15   AST  --   --  26     Medications     - MEDICATION INSTRUCTIONS -       sodium chloride 10 mL/hr at 10/28/21 1252       sodium chloride 0.9%  50 mL Intravenous Once     remdesivir  100 mg Intravenous Q24H    And     sodium chloride 0.9%  50 mL Intravenous " Q24H     aspirin  81 mg Oral Daily     atorvastatin  20 mg Oral At Bedtime     azithromycin  250 mg Intravenous Q24H     benzonatate  100 mg Oral TID     cefTRIAXone  2 g Intravenous Q24H     dexamethasone  6 mg Oral Daily     enoxaparin ANTICOAGULANT  30 mg Subcutaneous Q24H     fluticasone-vilanterol  1 puff Inhalation Daily     lidocaine  1 patch Transdermal Q24H     lidocaine   Transdermal Q8H     lisinopril  2.5 mg Oral Daily     metoprolol succinate ER  25 mg Oral Daily     nicotine  1 patch Transdermal Daily     nicotine   Transdermal Q8H     sodium chloride (PF)  3 mL Intracatheter Q8H     tiotropium  1 puff Inhalation Daily     venlafaxine  150 mg Oral Daily     venlafaxine  75 mg Oral Daily

## 2021-10-29 NOTE — PLAN OF CARE
Patient Sp02 >92% on 6L via HFNC.  Patient denies any shortness of breath at rest.  Had some anxiety and received xanax once this shift.  Was redirectable.  Lungs are clear, diminished in bases bilaterally.  VSS, afebrile

## 2021-10-29 NOTE — PROGRESS NOTES
:    I called patients phone in room to discuss discharge planning needs.  Patient stated she lives at home alone.  I offered patient SNF options and she was not sure if she wants to go but may have to go at discharge.  Patient chose SN[facility] from list of options.  I made referral and left message for Roopa at .  [facility] will not have any bed availability until next week.   will continue to follow for discharge planning needs.    YASMANI Mcdaniel on 10/29/2021 at 3:06 PM

## 2021-10-29 NOTE — PLAN OF CARE
Patient AxO. SOB with exertion. On 6L HFNC, O2 saturations 90-92%. Productive cough, tessalon given. Patient anxious and tearful, PRN ativan given. Empathetic listening and support provided. Ambulated to bathroom A1 with walker and gait belt. VSS.

## 2021-10-30 ENCOUNTER — APPOINTMENT (OUTPATIENT)
Dept: PHYSICAL THERAPY | Facility: OTHER | Age: 76
DRG: 177 | End: 2021-10-30
Attending: FAMILY MEDICINE
Payer: COMMERCIAL

## 2021-10-30 ENCOUNTER — APPOINTMENT (OUTPATIENT)
Dept: OCCUPATIONAL THERAPY | Facility: OTHER | Age: 76
DRG: 177 | End: 2021-10-30
Attending: FAMILY MEDICINE
Payer: COMMERCIAL

## 2021-10-30 LAB
ANION GAP SERPL CALCULATED.3IONS-SCNC: 7 MMOL/L (ref 3–14)
BUN SERPL-MCNC: 37 MG/DL (ref 7–25)
CALCIUM SERPL-MCNC: 9 MG/DL (ref 8.6–10.3)
CHLORIDE BLD-SCNC: 106 MMOL/L (ref 98–107)
CO2 SERPL-SCNC: 27 MMOL/L (ref 21–31)
CREAT SERPL-MCNC: 0.64 MG/DL (ref 0.6–1.2)
CRP SERPL-MCNC: 28 MG/L
D DIMER PPP FEU-MCNC: 0.86 UG/ML FEU (ref 0–0.5)
ERYTHROCYTE [DISTWIDTH] IN BLOOD BY AUTOMATED COUNT: 14 % (ref 10–15)
FIBRINOGEN PPP-MCNC: 510 MG/DL (ref 170–490)
GFR SERPL CREATININE-BSD FRML MDRD: 88 ML/MIN/1.73M2
GLUCOSE BLD-MCNC: 119 MG/DL (ref 70–105)
HCT VFR BLD AUTO: 35.8 % (ref 35–47)
HGB BLD-MCNC: 11.2 G/DL (ref 11.7–15.7)
MCH RBC QN AUTO: 28.4 PG (ref 26.5–33)
MCHC RBC AUTO-ENTMCNC: 31.3 G/DL (ref 31.5–36.5)
MCV RBC AUTO: 91 FL (ref 78–100)
PLATELET # BLD AUTO: 537 10E3/UL (ref 150–450)
POTASSIUM BLD-SCNC: 5.1 MMOL/L (ref 3.5–5.1)
RBC # BLD AUTO: 3.95 10E6/UL (ref 3.8–5.2)
SODIUM SERPL-SCNC: 140 MMOL/L (ref 134–144)
WBC # BLD AUTO: 7.7 10E3/UL (ref 4–11)

## 2021-10-30 PROCEDURE — 120N000001 HC R&B MED SURG/OB

## 2021-10-30 PROCEDURE — 97116 GAIT TRAINING THERAPY: CPT | Mod: GP

## 2021-10-30 PROCEDURE — 85027 COMPLETE CBC AUTOMATED: CPT | Performed by: INTERNAL MEDICINE

## 2021-10-30 PROCEDURE — 80048 BASIC METABOLIC PNL TOTAL CA: CPT | Performed by: INTERNAL MEDICINE

## 2021-10-30 PROCEDURE — 97530 THERAPEUTIC ACTIVITIES: CPT | Mod: GP

## 2021-10-30 PROCEDURE — 250N000013 HC RX MED GY IP 250 OP 250 PS 637: Performed by: INTERNAL MEDICINE

## 2021-10-30 PROCEDURE — 250N000009 HC RX 250: Performed by: INTERNAL MEDICINE

## 2021-10-30 PROCEDURE — 85379 FIBRIN DEGRADATION QUANT: CPT | Performed by: INTERNAL MEDICINE

## 2021-10-30 PROCEDURE — 258N000003 HC RX IP 258 OP 636: Performed by: INTERNAL MEDICINE

## 2021-10-30 PROCEDURE — 250N000011 HC RX IP 250 OP 636: Performed by: INTERNAL MEDICINE

## 2021-10-30 PROCEDURE — 97161 PT EVAL LOW COMPLEX 20 MIN: CPT | Mod: GP

## 2021-10-30 PROCEDURE — 87205 SMEAR GRAM STAIN: CPT | Performed by: INTERNAL MEDICINE

## 2021-10-30 PROCEDURE — 99232 SBSQ HOSP IP/OBS MODERATE 35: CPT | Performed by: FAMILY MEDICINE

## 2021-10-30 PROCEDURE — 250N000012 HC RX MED GY IP 250 OP 636 PS 637: Performed by: INTERNAL MEDICINE

## 2021-10-30 PROCEDURE — 97535 SELF CARE MNGMENT TRAINING: CPT | Mod: GO | Performed by: OCCUPATIONAL THERAPIST

## 2021-10-30 PROCEDURE — 85384 FIBRINOGEN ACTIVITY: CPT | Performed by: INTERNAL MEDICINE

## 2021-10-30 PROCEDURE — 97165 OT EVAL LOW COMPLEX 30 MIN: CPT | Mod: GO | Performed by: OCCUPATIONAL THERAPIST

## 2021-10-30 PROCEDURE — 86140 C-REACTIVE PROTEIN: CPT | Performed by: INTERNAL MEDICINE

## 2021-10-30 PROCEDURE — 36415 COLL VENOUS BLD VENIPUNCTURE: CPT | Performed by: INTERNAL MEDICINE

## 2021-10-30 RX ADMIN — METOPROLOL SUCCINATE 25 MG: 25 TABLET, EXTENDED RELEASE ORAL at 10:08

## 2021-10-30 RX ADMIN — DEXAMETHASONE 6 MG: 2 TABLET ORAL at 12:16

## 2021-10-30 RX ADMIN — ENOXAPARIN SODIUM 30 MG: 100 INJECTION SUBCUTANEOUS at 10:07

## 2021-10-30 RX ADMIN — CEFTRIAXONE SODIUM 2 G: 2 INJECTION, SOLUTION INTRAVENOUS at 10:07

## 2021-10-30 RX ADMIN — REMDESIVIR 100 MG: 100 INJECTION, POWDER, LYOPHILIZED, FOR SOLUTION INTRAVENOUS at 15:44

## 2021-10-30 RX ADMIN — Medication 1 PATCH: at 10:07

## 2021-10-30 RX ADMIN — TIOTROPIUM BROMIDE INHALATION SPRAY 1 PUFF: 3.12 SPRAY, METERED RESPIRATORY (INHALATION) at 07:57

## 2021-10-30 RX ADMIN — ALPRAZOLAM 0.5 MG: 0.5 TABLET ORAL at 12:16

## 2021-10-30 RX ADMIN — FLUTICASONE FUROATE AND VILANTEROL TRIFENATATE 1 PUFF: 200; 25 POWDER RESPIRATORY (INHALATION) at 07:57

## 2021-10-30 RX ADMIN — ASPIRIN 81 MG: 81 TABLET ORAL at 10:07

## 2021-10-30 RX ADMIN — BENZONATATE 100 MG: 100 CAPSULE ORAL at 07:57

## 2021-10-30 RX ADMIN — ATORVASTATIN CALCIUM 20 MG: 20 TABLET, FILM COATED ORAL at 21:59

## 2021-10-30 RX ADMIN — BENZONATATE 100 MG: 100 CAPSULE ORAL at 15:44

## 2021-10-30 RX ADMIN — VENLAFAXINE HYDROCHLORIDE 150 MG: 75 CAPSULE, EXTENDED RELEASE ORAL at 10:07

## 2021-10-30 RX ADMIN — LISINOPRIL 2.5 MG: 2.5 TABLET ORAL at 10:08

## 2021-10-30 RX ADMIN — BENZONATATE 100 MG: 100 CAPSULE ORAL at 12:16

## 2021-10-30 RX ADMIN — LIDOCAINE 5% 1 PATCH: 700 PATCH TOPICAL at 10:07

## 2021-10-30 RX ADMIN — VENLAFAXINE HYDROCHLORIDE 75 MG: 75 CAPSULE, EXTENDED RELEASE ORAL at 10:08

## 2021-10-30 RX ADMIN — AZITHROMYCIN MONOHYDRATE 250 MG: 500 INJECTION, POWDER, LYOPHILIZED, FOR SOLUTION INTRAVENOUS at 10:46

## 2021-10-30 ASSESSMENT — ACTIVITIES OF DAILY LIVING (ADL)
ADLS_ACUITY_SCORE: 11

## 2021-10-30 ASSESSMENT — MIFFLIN-ST. JEOR: SCORE: 1068.63

## 2021-10-30 NOTE — PLAN OF CARE
Patient AxO, reports chronic neck/back pain. Lidoderm patch applied. Patient weaned from 6L O2 to 3.5 L, O2 sats maintained 92-94%. Productive frequent cough, sputum sample collected. Fine crackles in bases of lungs. Dyspnea with exertion. Tearful and worried, PRN xanax given with some relief. Up A1 with GB and walker. VSS.

## 2021-10-30 NOTE — PROGRESS NOTES
[facility]    Medicine Progress Note - Hospitalist Service       Date of Admission:  10/26/2021    Assessment & Plan              Jaida Nieto is a 76 year-old female admitted on 10/26/2021.  She was diagnosed with COVID on October 16th.  She presents with worsening shortness of breath and hypoxia to the emergency department.    Acute respiratory failure with hypoxia  Present on admission, presumed secondary to COVID-19 infection, though concomitant bacterial pneumonia also a possibility and complicated by underlying severe COPD.   Currently requiring 6 L/min O2 via nasal cannula.  - COVID treatment with Remdesivir, Dexamethasone, and Lovenox.  - Continue Ceftriaxone and Azithromycin for bacterial pneumonia coverage, day 5.  - Continue home inhaler regimen.  - Continue supplemental oxygen.  - Monitor fluid status.    Pneumonia due to 2019 novel coronavirus  Present on admission, diagnosed 10/16.  She had not received any treatment previously.  - Continue Remdesivir and Dexamethasone, day 4.  - Anticoagulation with Lovenox.  - Daily CBC, CRP, D-dimer, and Fibrinogen activity.    COPD (chronic obstructive pulmonary disease)  Severe COPD at baseline.  She is not oxygen dependent at baseline.  - Continue Breo Ellipta and Spiriva.    Hypertension  Chronic.  - Continue home Lisinopril and Metoprolol.    Hypomagnesemia  Mg 1.9 yesterday.  - Continue to monitor and replace per protocol.    Hyponatremia  Resolved.  - Routine monitoring.    Malnutrition  Severe malnutrition based on weight loss, reduced intake, and mild (or greater) subcutaneous fat loss, mild (or greater) muscle loss.  - Snacks and supplements with meals.     Diet: Combination Diet Regular Diet Adult  Snacks/Supplements Adult: Ensure Enlive; With Meals    DVT Prophylaxis: Enoxaparin (Lovenox) SQ  Llamas Catheter: Not present  Central Lines: None  Code Status: Full Code      Disposition Plan   Expected discharge: 3-5 days recommended to  prior living arrangement versus TCU once O2 use less than 4 liters/minute and safe disposition plan/ TCU bed available.     The patient's care was discussed with the Patient.    Brandy Mckeon DO  Hospitalist Service  [facility]    ______________________________________________________________________    Interval History   No acute events overnight.  She is feeling somewhat better this morning.  She continues to have a cough and spasms in her chest, unchanged.  Her appetite has been good.  No fever/chills.    Data reviewed today: I reviewed all medications, new labs and imaging results over the last 24 hours. I personally reviewed no images or EKG's today.    Physical Exam   Vital Signs: Temp: 97.5  F (36.4  C) Temp src: Tympanic BP: (!) 150/75 Pulse: 80   Resp: 16 SpO2: 97 % O2 Device: High Flow Nasal Cannula (HFNC) Oxygen Delivery: 6 LPM (weaned to 5)  Weight: 118 lbs 2.66 oz  General Appearance: Alert. No acute distress.  Respiratory: Normal rate and effort. Coarse breath sounds over lung base bilaterally.  Cardiovascular: RRR.  GI: Soft. Non-tender. Non-distended. Normal bowel sounds.  Extremities: No lower extremity swelling.  Psych: Normal mood and affect.    Data   Recent Labs   Lab 10/30/21  0601 10/29/21  0637 10/28/21  0627 10/26/21  2153 10/26/21  2153   WBC 7.7 11.2* 12.5*   < > 10.3   HGB 11.2* 11.2* 11.4*   < > 12.3   MCV 91 89 87   < > 87   * 531* 474*   < > 407    139 142   < > 126*   POTASSIUM 5.1 5.0 4.8   < > 3.7   CHLORIDE 106 105 108*   < > 108*   CO2 27 27 25   < > 28   BUN 37* 55* 45*   < > 30*   CR 0.64 1.02 0.93   < > 0.93   ANIONGAP 7 7 9   < > <1*   DARRIUS 9.0 9.1 9.3   < > 9.3   * 135* 145*   < > 109*   ALBUMIN  --   --   --   --  3.3*   PROTTOTAL  --   --   --   --  6.8   BILITOTAL  --   --   --   --  0.5   ALKPHOS  --   --   --   --  70   ALT  --   --   --   --  15   AST  --   --   --   --  26    < > = values in this interval not displayed.      Medications     - MEDICATION INSTRUCTIONS -       sodium chloride 10 mL/hr at 10/28/21 1252       sodium chloride 0.9%  50 mL Intravenous Once     remdesivir  100 mg Intravenous Q24H    And     sodium chloride 0.9%  50 mL Intravenous Q24H     aspirin  81 mg Oral Daily     atorvastatin  20 mg Oral At Bedtime     azithromycin  250 mg Intravenous Q24H     benzonatate  100 mg Oral TID     cefTRIAXone  2 g Intravenous Q24H     dexamethasone  6 mg Oral Daily     enoxaparin ANTICOAGULANT  30 mg Subcutaneous Q24H     fluticasone-vilanterol  1 puff Inhalation Daily     lidocaine  1 patch Transdermal Q24H     lidocaine   Transdermal Q8H     lisinopril  2.5 mg Oral Daily     metoprolol succinate ER  25 mg Oral Daily     nicotine  1 patch Transdermal Daily     nicotine   Transdermal Q8H     sodium chloride (PF)  3 mL Intracatheter Q8H     tiotropium  1 puff Inhalation Daily     venlafaxine  150 mg Oral Daily     venlafaxine  75 mg Oral Daily

## 2021-10-30 NOTE — PROGRESS NOTES
10/30/21 1250   Quick Adds   Type of Visit Initial PT Evaluation   Living Environment   People in home significant other   Current Living Arrangements apartment   Home Accessibility no concerns   Transportation Anticipated car, drives self   Self-Care   Usual Activity Tolerance moderate   Current Activity Tolerance poor   General Information   General Observations Pt tested positive for Covid on 10/16 where she then went home. She eventually got worse at home and now has weakness, is SOB, and unable to care for her self.   Cognition   Orientation Status (Cognition) oriented x 4   Affect/Mental Status (Cognition) sad/depressed affect   Follows Commands (Cognition) WNL   Range of Motion (ROM)   ROM Comment limited due to weakness   Strength Comprehensive (MMT)   General Manual Muscle Testing (MMT) Assessment   (global weakness is extremities and trunk)   Strength   Manual Muscle Testing Quick Adds Deficits observed during functional mobility   Bed Mobility   Bed Mobility supine-sit   Supine-Sit [place] (Bed Mobility) minimum assist (75% patient effort)   Bed Mobility Limitations impaired ability to control trunk for mobility   Impairments Contributing to Impaired Bed Mobility decreased strength   Assistive Device (Bed Mobility) bed rails   Gait/Stairs (Locomotion)   [place] Level (Gait) contact guard   Assistive Device (Gait) walker, front-wheeled   Distance in Feet (Required for LE Total Joints) 25   Pattern (Gait) step-to   Deviations/Abnormal Patterns (Gait) cande decreased;gait speed decreased   Maintains Weight-bearing Status (Gait) able to maintain   Balance   Balance Comments steady with FWW but unsteady without device   Clinical Impression   Criteria for Skilled Therapeutic Intervention yes, treatment indicated   PT Diagnosis (PT) impaired mobility   Influenced by the following impairments SOB, weakness   Functional limitations due to impairments limited activity tolerance, impaired ambulation    Clinical Presentation Stable/Uncomplicated   Clinical Decision Making (Complexity) low complexity   Therapy Frequency (PT) Daily   Predicted Duration of Therapy Intervention (days/wks) 2-3 days   Planned Therapy Interventions (PT) balance training;strengthening;gait training   Anticipated Equipment Needs at Discharge (PT) walker, rolling   Risk & Benefits of therapy have been explained evaluation/treatment results reviewed   PT Discharge Planning    PT Discharge Recommendation (DC Rec) Transitional Care Facility   PT Rationale for DC Rec Pt is below functional level, additional therapy to maximize safe mobility and return to baseline function   PT Brief overview of current status  Min assist for bed mobility, close cga with FWW for ambulation. Pt demonstrates weakness and SOB with activity while on 4.5L of O2   Total Evaluation Time   Total Evaluation Time (Minutes) 15

## 2021-10-30 NOTE — PROGRESS NOTES
10/30/21 1224   Quick Adds   Type of Visit Initial Occupational Therapy Evaluation   Living Environment   People in home significant other   Current Living Arrangements apartment   Home Accessibility no concerns   Transportation Anticipated car, drives self   Self-Care   Usual Activity Tolerance moderate   Current Activity Tolerance poor   Disability/Function   Hearing Difficulty or Deaf no   Cognitive Status Examination   Orientation Status orientation to person, place and time   Affect/Mental Status (Cognitive) WFL   Follows Commands WFL   Pain Assessment   Patient Currently in Pain No   Range of Motion Comprehensive   General Range of Motion   (limited bilaterally )   Strength Comprehensive (MMT)   General Manual Muscle Testing (MMT) Assessment   (impaired overall )   Coordination   Upper Extremity Coordination No deficits were identified   Bed Mobility   Bed Mobility supine-sit   Supine-Sit [place] (Bed Mobility) minimum assist (75% patient effort)   Transfers   Transfers bed-chair transfer;toilet transfer   Transfer Skill: Bed to Chair/Chair to Bed   Bed-Chair [place] (Transfers) contact guard   Assistive Device (Bed-Chair Transfers) rolling walker   Toilet Transfer   [place] Level (Toilet Transfer) contact guard   Assistive Device (Toilet Transfer) grab bars/safety frame   Clinical Impression   Criteria for Skilled Therapeutic Interventions Met (OT) yes   OT Diagnosis covid/weakness    OT Problem List-Impairments impacting ADL activity tolerance impaired;strength   Assessment of Occupational Performance 1-3 Performance Deficits   Planned Therapy Interventions (OT) ADL retraining;progressive activity/exercise   Clinical Decision Making Complexity (OT) low complexity   Therapy Frequency (OT) Daily   Predicted Duration of Therapy 3-4 days    Risk & Benefits of therapy have been explained risks/benefits reviewed   OT Discharge Planning    OT Discharge Recommendation (DC Rec) Home with assist;home  with home care occupational therapy   OT Rationale for DC Rec Pt needs on going therapy to maximize level of indendence needed to return home as previous    OT Brief overview of current status  Pt very pleasant, very weak with increased coughing upon exertion, on 4.5 liters of O2, SOB and fatigued with all activity, toileted with CGA and ambualted to recliner with FWW and CGA.    Total Evaluation Time (Minutes)   Total Evaluation Time (Minutes) 15

## 2021-10-30 NOTE — PLAN OF CARE
Patient alert and oriented, vitals stable on 6 LPM HFNC. Denies pain. See other note. Patient able to sleep most of the night. Up to bedside commode with A1. Adequate urinary output. Will continue to monitor.  Alma Bacon RN on 10/30/2021 at 5:46 AM

## 2021-10-30 NOTE — PROGRESS NOTES
Patient alert and oriented, vitals stable on 6 LPM HFNC. Patient tearful/anxious, PRN Xanax administered per MAR with relief, patient able to sleep. Lungs diminished, deep, congested cough noted. Will continue to monitor.  Alma Bacon RN on 10/30/2021 at 12:29 AM

## 2021-10-31 ENCOUNTER — APPOINTMENT (OUTPATIENT)
Dept: OCCUPATIONAL THERAPY | Facility: OTHER | Age: 76
DRG: 177 | End: 2021-10-31
Payer: COMMERCIAL

## 2021-10-31 ENCOUNTER — APPOINTMENT (OUTPATIENT)
Dept: PHYSICAL THERAPY | Facility: OTHER | Age: 76
DRG: 177 | End: 2021-10-31
Payer: COMMERCIAL

## 2021-10-31 LAB
ANION GAP SERPL CALCULATED.3IONS-SCNC: 7 MMOL/L (ref 3–14)
BUN SERPL-MCNC: 33 MG/DL (ref 7–25)
CALCIUM SERPL-MCNC: 9.4 MG/DL (ref 8.6–10.3)
CHLORIDE BLD-SCNC: 102 MMOL/L (ref 98–107)
CO2 SERPL-SCNC: 30 MMOL/L (ref 21–31)
CREAT SERPL-MCNC: 0.74 MG/DL (ref 0.6–1.2)
CRP SERPL-MCNC: 20 MG/L
D DIMER PPP FEU-MCNC: 1.27 UG/ML FEU (ref 0–0.5)
ERYTHROCYTE [DISTWIDTH] IN BLOOD BY AUTOMATED COUNT: 14.1 % (ref 10–15)
FIBRINOGEN PPP-MCNC: 530 MG/DL (ref 170–490)
GFR SERPL CREATININE-BSD FRML MDRD: 80 ML/MIN/1.73M2
GLUCOSE BLD-MCNC: 119 MG/DL (ref 70–105)
HCT VFR BLD AUTO: 39.4 % (ref 35–47)
HGB BLD-MCNC: 12.3 G/DL (ref 11.7–15.7)
MCH RBC QN AUTO: 28.2 PG (ref 26.5–33)
MCHC RBC AUTO-ENTMCNC: 31.2 G/DL (ref 31.5–36.5)
MCV RBC AUTO: 90 FL (ref 78–100)
PLATELET # BLD AUTO: 607 10E3/UL (ref 150–450)
POTASSIUM BLD-SCNC: 4.8 MMOL/L (ref 3.5–5.1)
RBC # BLD AUTO: 4.36 10E6/UL (ref 3.8–5.2)
SODIUM SERPL-SCNC: 139 MMOL/L (ref 134–144)
WBC # BLD AUTO: 7.8 10E3/UL (ref 4–11)

## 2021-10-31 PROCEDURE — 97116 GAIT TRAINING THERAPY: CPT | Mod: GP

## 2021-10-31 PROCEDURE — 250N000012 HC RX MED GY IP 250 OP 636 PS 637: Performed by: INTERNAL MEDICINE

## 2021-10-31 PROCEDURE — 250N000011 HC RX IP 250 OP 636: Performed by: INTERNAL MEDICINE

## 2021-10-31 PROCEDURE — 99232 SBSQ HOSP IP/OBS MODERATE 35: CPT | Performed by: FAMILY MEDICINE

## 2021-10-31 PROCEDURE — 999N000157 HC STATISTIC RCP TIME EA 10 MIN

## 2021-10-31 PROCEDURE — 85027 COMPLETE CBC AUTOMATED: CPT | Performed by: INTERNAL MEDICINE

## 2021-10-31 PROCEDURE — 97535 SELF CARE MNGMENT TRAINING: CPT | Mod: GO | Performed by: OCCUPATIONAL THERAPIST

## 2021-10-31 PROCEDURE — 97530 THERAPEUTIC ACTIVITIES: CPT | Mod: GP

## 2021-10-31 PROCEDURE — 85379 FIBRIN DEGRADATION QUANT: CPT | Performed by: INTERNAL MEDICINE

## 2021-10-31 PROCEDURE — 250N000013 HC RX MED GY IP 250 OP 250 PS 637: Performed by: INTERNAL MEDICINE

## 2021-10-31 PROCEDURE — 80048 BASIC METABOLIC PNL TOTAL CA: CPT | Performed by: INTERNAL MEDICINE

## 2021-10-31 PROCEDURE — 94640 AIRWAY INHALATION TREATMENT: CPT

## 2021-10-31 PROCEDURE — 85384 FIBRINOGEN ACTIVITY: CPT | Performed by: INTERNAL MEDICINE

## 2021-10-31 PROCEDURE — 250N000009 HC RX 250: Performed by: INTERNAL MEDICINE

## 2021-10-31 PROCEDURE — 36415 COLL VENOUS BLD VENIPUNCTURE: CPT | Performed by: INTERNAL MEDICINE

## 2021-10-31 PROCEDURE — 86140 C-REACTIVE PROTEIN: CPT | Performed by: INTERNAL MEDICINE

## 2021-10-31 PROCEDURE — 120N000001 HC R&B MED SURG/OB

## 2021-10-31 PROCEDURE — 258N000003 HC RX IP 258 OP 636: Performed by: INTERNAL MEDICINE

## 2021-10-31 RX ADMIN — TIOTROPIUM BROMIDE INHALATION SPRAY 1 PUFF: 3.12 SPRAY, METERED RESPIRATORY (INHALATION) at 07:23

## 2021-10-31 RX ADMIN — VENLAFAXINE HYDROCHLORIDE 75 MG: 75 CAPSULE, EXTENDED RELEASE ORAL at 09:23

## 2021-10-31 RX ADMIN — ALPRAZOLAM 0.5 MG: 0.5 TABLET ORAL at 12:37

## 2021-10-31 RX ADMIN — BENZONATATE 100 MG: 100 CAPSULE ORAL at 16:47

## 2021-10-31 RX ADMIN — VENLAFAXINE HYDROCHLORIDE 150 MG: 75 CAPSULE, EXTENDED RELEASE ORAL at 09:23

## 2021-10-31 RX ADMIN — ASPIRIN 81 MG: 81 TABLET ORAL at 09:22

## 2021-10-31 RX ADMIN — CEFTRIAXONE SODIUM 2 G: 2 INJECTION, SOLUTION INTRAVENOUS at 09:22

## 2021-10-31 RX ADMIN — ENOXAPARIN SODIUM 30 MG: 100 INJECTION SUBCUTANEOUS at 09:22

## 2021-10-31 RX ADMIN — LIDOCAINE 5% 1 PATCH: 700 PATCH TOPICAL at 09:22

## 2021-10-31 RX ADMIN — BENZONATATE 100 MG: 100 CAPSULE ORAL at 07:35

## 2021-10-31 RX ADMIN — METOPROLOL SUCCINATE 25 MG: 25 TABLET, EXTENDED RELEASE ORAL at 09:22

## 2021-10-31 RX ADMIN — Medication 1 PATCH: at 09:23

## 2021-10-31 RX ADMIN — TRAMADOL HYDROCHLORIDE 50 MG: 50 TABLET, FILM COATED ORAL at 07:46

## 2021-10-31 RX ADMIN — ALPRAZOLAM 0.5 MG: 0.5 TABLET ORAL at 21:32

## 2021-10-31 RX ADMIN — REMDESIVIR 100 MG: 100 INJECTION, POWDER, LYOPHILIZED, FOR SOLUTION INTRAVENOUS at 16:46

## 2021-10-31 RX ADMIN — DEXAMETHASONE 6 MG: 2 TABLET ORAL at 12:33

## 2021-10-31 RX ADMIN — FLUTICASONE FUROATE AND VILANTEROL TRIFENATATE 1 PUFF: 200; 25 POWDER RESPIRATORY (INHALATION) at 07:24

## 2021-10-31 RX ADMIN — ATORVASTATIN CALCIUM 20 MG: 20 TABLET, FILM COATED ORAL at 21:32

## 2021-10-31 RX ADMIN — ONDANSETRON 4 MG: 4 TABLET, ORALLY DISINTEGRATING ORAL at 16:50

## 2021-10-31 RX ADMIN — LISINOPRIL 2.5 MG: 2.5 TABLET ORAL at 09:23

## 2021-10-31 RX ADMIN — BENZONATATE 100 MG: 100 CAPSULE ORAL at 12:33

## 2021-10-31 RX ADMIN — TRAMADOL HYDROCHLORIDE 50 MG: 50 TABLET, FILM COATED ORAL at 16:50

## 2021-10-31 ASSESSMENT — ACTIVITIES OF DAILY LIVING (ADL)
ADLS_ACUITY_SCORE: 11

## 2021-10-31 ASSESSMENT — MIFFLIN-ST. JEOR: SCORE: 1043.83

## 2021-10-31 NOTE — PROGRESS NOTES
[facility]    Medicine Progress Note - Hospitalist Service       Date of Admission:  10/26/2021    Assessment & Plan           Jaida Nieto is a 76 year-old female admitted on 10/26/2021.  She was diagnosed with COVID on October 16th.  She presents with worsening shortness of breath and hypoxia to the emergency department.    Acute respiratory failure with hypoxia  Present on admission, presumed secondary to COVID-19 infection, though concomitant bacterial pneumonia also a possibility and complicated by underlying severe COPD.   Oxygen use down to 2 L/min O2 via nasal cannula.  - COVID treatment with Remdesivir, Dexamethasone, and Lovenox.  - Completed five-day course of Azithromycin.  Continue Ceftriaxone, day 6/7.  - Continue home inhaler regimen.  - Continue supplemental oxygen.  - Monitor fluid status.    Pneumonia due to 2019 novel coronavirus  Present on admission, diagnosed 10/16.  She had not received any treatment previously.  - Continue Remdesivir and Dexamethasone, day 5.  - Anticoagulation with Lovenox.  - Daily CBC, CRP, D-dimer, and Fibrinogen activity.    COPD (chronic obstructive pulmonary disease)  Severe COPD at baseline.  She is not oxygen dependent at baseline.  - Continue Breo Ellipta and Spiriva.    Hypertension  Chronic.  - Continue home Lisinopril and Metoprolol.    Hypomagnesemia  Mg 1.9 yesterday.  - Continue to monitor and replace per protocol.    Hyponatremia  Resolved.  - Routine monitoring.    Malnutrition  Severe malnutrition based on weight loss, reduced intake, and mild (or greater) subcutaneous fat loss, mild (or greater) muscle loss.  - Snacks and supplements with meals.       Diet: Combination Diet Regular Diet Adult  Snacks/Supplements Adult: Ensure Enlive; With Meals    DVT Prophylaxis: Enoxaparin (Lovenox) SQ  Llamas Catheter: Not present  Central Lines: None  Code Status: Full Code      Disposition Plan   Expected discharge: recommended to transitional  care unit once safe disposition plan/ TCU bed available.     The patient's care was discussed with the Patient.    Brandy Mckeon DO  Hospitalist Service  [facility]  Securely message with the Vocera Web Console (learn more here)  Text page via [facility] Paging/Directory        Clinically Significant Risk Factors Present on Admission                ______________________________________________________________________    Interval History   No acute events overnight.  She has been able to wean to 2 L/min O2.  She denies chest pain and nausea/vomiting.  Her appetite has been good.    Data reviewed today: I reviewed all medications, new labs and imaging results over the last 24 hours. I personally reviewed no images or EKG's today.    Physical Exam   Vital Signs: Temp: 97.3  F (36.3  C) Temp src: Tympanic BP: 129/80 Pulse: 80   Resp: 18 SpO2: 92 % O2 Device: Nasal cannula Oxygen Delivery: 2 LPM  Weight: 112 lbs 11.2 oz  General Appearance: Alert. No acute distress.  Respiratory: Normal rate and effort. Coarse breath sounds at lung base bilaterally.  Cardiovascular: RRR.  GI: Soft. Non-tender. Non-distended. Normal bowel sounds.  Extremities: No swelling or edema of lower extremity bilaterally.  Psych: Normal mood and affect.    Data   Recent Labs   Lab 10/31/21  0504 10/30/21  0601 10/29/21  0637 10/28/21  0627 10/26/21  2153   WBC 7.8 7.7 11.2*   < > 10.3   HGB 12.3 11.2* 11.2*   < > 12.3   MCV 90 91 89   < > 87   * 537* 531*   < > 407    140 139   < > 126*   POTASSIUM 4.8 5.1 5.0   < > 3.7   CHLORIDE 102 106 105   < > 108*   CO2 30 27 27   < > 28   BUN 33* 37* 55*   < > 30*   CR 0.74 0.64 1.02   < > 0.93   ANIONGAP 7 7 7   < > <1*   DARRIUS 9.4 9.0 9.1   < > 9.3   * 119* 135*   < > 109*   ALBUMIN  --   --   --   --  3.3*   PROTTOTAL  --   --   --   --  6.8   BILITOTAL  --   --   --   --  0.5   ALKPHOS  --   --   --   --  70   ALT  --   --   --   --  15   AST  --   --   --   --  26     < > = values in this interval not displayed.     Medications     - MEDICATION INSTRUCTIONS -       sodium chloride 10 mL/hr at 10/28/21 1252       sodium chloride 0.9%  50 mL Intravenous Once     remdesivir  100 mg Intravenous Q24H    And     sodium chloride 0.9%  50 mL Intravenous Q24H     aspirin  81 mg Oral Daily     atorvastatin  20 mg Oral At Bedtime     benzonatate  100 mg Oral TID     cefTRIAXone  2 g Intravenous Q24H     dexamethasone  6 mg Oral Daily     enoxaparin ANTICOAGULANT  30 mg Subcutaneous Q24H     fluticasone-vilanterol  1 puff Inhalation Daily     lidocaine  1 patch Transdermal Q24H     lidocaine   Transdermal Q8H     lisinopril  2.5 mg Oral Daily     metoprolol succinate ER  25 mg Oral Daily     nicotine  1 patch Transdermal Daily     nicotine   Transdermal Q8H     sodium chloride (PF)  3 mL Intracatheter Q8H     tiotropium  1 puff Inhalation Daily     venlafaxine  150 mg Oral Daily     venlafaxine  75 mg Oral Daily

## 2021-10-31 NOTE — PROGRESS NOTES
Pt administered Breo and Spiriva inhalers as ordered. O2 titrated to 2lpm via NC, improved resp status. Possible home O2 candidate.

## 2021-10-31 NOTE — PROGRESS NOTES
10/31/21 1057   Signing Clinician's Name / Credentials   Signing clinician's name / credentials Mishel Bonilla OTR/L    Quick Adds   Rehab Discipline OT   Gait Training   Symptoms Noted During/After Treatment (Gait Training) fatigue   [place] Level (Gait Training) contact guard   Physical Assistance Level (Gait Training) 1 person assist   Assistive Device (Gait Training) rolling walker   Grooming Training   [place] Level (Grooming Training) stand-by assist   Assistance (Grooming Training) supervision;set-up required   Bathing Training   [place] Level (Bathing Training) stand-by assist   Assistance (Bathing Training) supervision;set-up required  (with upper body bathing )   OT Discharge Planning    OT Discharge Recommendation (DC Rec) Transitional Care Facility   OT Rationale for DC Rec Pt has decreased endurance for functional activity, would benefit from on-going therapies    OT Brief overview of current status  Pt very pleasant and reports feeling much better today, up in room with CGA using FWW, completed light self cares while seated, tolerated all very well on 2 liters of O2, sats drop a bit with activity but recovers fast.    Additional Documentation   OT Plan cont OT    Total Session Time   Total Session Time (minutes) 45 minutes

## 2021-10-31 NOTE — PLAN OF CARE
Patient alert and oriented, vitals stable on 3.5 LPM. Denies pain. Lungs with crackles, continues to have productive cough. Up to bedside commode with A1. Adequate urinary output. Will continue to monitor.  Alma Bacon RN on 10/31/2021 at 5:57 AM

## 2021-10-31 NOTE — PLAN OF CARE
Patient AxO, reports chronic back/neck pain. PRN tramadol given x2, some relief provided. Lidoderm patch in place. PRN Zofran for nausea. Weaned to 1.5L O2, O2 saturations maintained 90-92%. LS diminished in bases.  Frequent productive cough. Up A1 with GB and walker. VSS.   Attending Only

## 2021-11-01 ENCOUNTER — APPOINTMENT (OUTPATIENT)
Dept: PHYSICAL THERAPY | Facility: OTHER | Age: 76
DRG: 177 | End: 2021-11-01
Payer: COMMERCIAL

## 2021-11-01 LAB
BACTERIA BLD CULT: NO GROWTH
BACTERIA BLD CULT: NO GROWTH
BACTERIA SPT CULT: ABNORMAL
CRP SERPL-MCNC: 14 MG/L
D DIMER PPP FEU-MCNC: 0.77 UG/ML FEU (ref 0–0.5)
ERYTHROCYTE [DISTWIDTH] IN BLOOD BY AUTOMATED COUNT: 14 % (ref 10–15)
FIBRINOGEN PPP-MCNC: 462 MG/DL (ref 170–490)
GRAM STAIN RESULT: ABNORMAL
HCT VFR BLD AUTO: 35.3 % (ref 35–47)
HGB BLD-MCNC: 11.1 G/DL (ref 11.7–15.7)
MCH RBC QN AUTO: 28.5 PG (ref 26.5–33)
MCHC RBC AUTO-ENTMCNC: 31.4 G/DL (ref 31.5–36.5)
MCV RBC AUTO: 91 FL (ref 78–100)
PLATELET # BLD AUTO: 545 10E3/UL (ref 150–450)
RBC # BLD AUTO: 3.9 10E6/UL (ref 3.8–5.2)
WBC # BLD AUTO: 7 10E3/UL (ref 4–11)

## 2021-11-01 PROCEDURE — 250N000013 HC RX MED GY IP 250 OP 250 PS 637: Performed by: INTERNAL MEDICINE

## 2021-11-01 PROCEDURE — 999N000157 HC STATISTIC RCP TIME EA 10 MIN

## 2021-11-01 PROCEDURE — 85027 COMPLETE CBC AUTOMATED: CPT | Performed by: FAMILY MEDICINE

## 2021-11-01 PROCEDURE — 99231 SBSQ HOSP IP/OBS SF/LOW 25: CPT | Performed by: INTERNAL MEDICINE

## 2021-11-01 PROCEDURE — 97116 GAIT TRAINING THERAPY: CPT | Mod: GP

## 2021-11-01 PROCEDURE — 94640 AIRWAY INHALATION TREATMENT: CPT | Mod: 76

## 2021-11-01 PROCEDURE — 85384 FIBRINOGEN ACTIVITY: CPT | Performed by: FAMILY MEDICINE

## 2021-11-01 PROCEDURE — 97530 THERAPEUTIC ACTIVITIES: CPT | Mod: GO | Performed by: OCCUPATIONAL THERAPIST

## 2021-11-01 PROCEDURE — 36415 COLL VENOUS BLD VENIPUNCTURE: CPT | Performed by: FAMILY MEDICINE

## 2021-11-01 PROCEDURE — 250N000012 HC RX MED GY IP 250 OP 636 PS 637: Performed by: INTERNAL MEDICINE

## 2021-11-01 PROCEDURE — 250N000011 HC RX IP 250 OP 636: Performed by: INTERNAL MEDICINE

## 2021-11-01 PROCEDURE — 86140 C-REACTIVE PROTEIN: CPT | Performed by: FAMILY MEDICINE

## 2021-11-01 PROCEDURE — 85379 FIBRIN DEGRADATION QUANT: CPT | Performed by: FAMILY MEDICINE

## 2021-11-01 PROCEDURE — 94640 AIRWAY INHALATION TREATMENT: CPT

## 2021-11-01 PROCEDURE — 120N000001 HC R&B MED SURG/OB

## 2021-11-01 PROCEDURE — 97530 THERAPEUTIC ACTIVITIES: CPT | Mod: GP

## 2021-11-01 PROCEDURE — 97535 SELF CARE MNGMENT TRAINING: CPT | Mod: GO | Performed by: OCCUPATIONAL THERAPIST

## 2021-11-01 RX ORDER — VENLAFAXINE HYDROCHLORIDE 75 MG/1
75 CAPSULE, EXTENDED RELEASE ORAL EVERY EVENING
Status: DISCONTINUED | OUTPATIENT
Start: 2021-11-01 | End: 2021-11-02 | Stop reason: HOSPADM

## 2021-11-01 RX ADMIN — FLUTICASONE FUROATE AND VILANTEROL TRIFENATATE 1 PUFF: 200; 25 POWDER RESPIRATORY (INHALATION) at 08:16

## 2021-11-01 RX ADMIN — BENZONATATE 100 MG: 100 CAPSULE ORAL at 08:08

## 2021-11-01 RX ADMIN — BENZONATATE 100 MG: 100 CAPSULE ORAL at 17:09

## 2021-11-01 RX ADMIN — TRAMADOL HYDROCHLORIDE 50 MG: 50 TABLET, FILM COATED ORAL at 10:01

## 2021-11-01 RX ADMIN — ACETAMINOPHEN 650 MG: 325 TABLET, FILM COATED ORAL at 14:17

## 2021-11-01 RX ADMIN — LISINOPRIL 2.5 MG: 2.5 TABLET ORAL at 10:01

## 2021-11-01 RX ADMIN — LIDOCAINE 5% 1 PATCH: 700 PATCH TOPICAL at 10:02

## 2021-11-01 RX ADMIN — ENOXAPARIN SODIUM 30 MG: 100 INJECTION SUBCUTANEOUS at 10:00

## 2021-11-01 RX ADMIN — VENLAFAXINE HYDROCHLORIDE 75 MG: 75 CAPSULE, EXTENDED RELEASE ORAL at 22:22

## 2021-11-01 RX ADMIN — ATORVASTATIN CALCIUM 20 MG: 20 TABLET, FILM COATED ORAL at 22:22

## 2021-11-01 RX ADMIN — METOPROLOL SUCCINATE 25 MG: 25 TABLET, EXTENDED RELEASE ORAL at 10:01

## 2021-11-01 RX ADMIN — ALPRAZOLAM 0.5 MG: 0.5 TABLET ORAL at 22:23

## 2021-11-01 RX ADMIN — VENLAFAXINE HYDROCHLORIDE 150 MG: 75 CAPSULE, EXTENDED RELEASE ORAL at 10:01

## 2021-11-01 RX ADMIN — TRAMADOL HYDROCHLORIDE 50 MG: 50 TABLET, FILM COATED ORAL at 17:09

## 2021-11-01 RX ADMIN — TIOTROPIUM BROMIDE INHALATION SPRAY 1 PUFF: 3.12 SPRAY, METERED RESPIRATORY (INHALATION) at 08:10

## 2021-11-01 RX ADMIN — CEFTRIAXONE SODIUM 2 G: 2 INJECTION, SOLUTION INTRAVENOUS at 10:11

## 2021-11-01 RX ADMIN — ALPRAZOLAM 0.5 MG: 0.5 TABLET ORAL at 14:17

## 2021-11-01 RX ADMIN — DEXAMETHASONE 6 MG: 2 TABLET ORAL at 14:16

## 2021-11-01 RX ADMIN — ASPIRIN 81 MG: 81 TABLET ORAL at 10:01

## 2021-11-01 RX ADMIN — Medication 1 PATCH: at 10:02

## 2021-11-01 RX ADMIN — BENZONATATE 100 MG: 100 CAPSULE ORAL at 22:22

## 2021-11-01 ASSESSMENT — ACTIVITIES OF DAILY LIVING (ADL)
ADLS_ACUITY_SCORE: 12
ADLS_ACUITY_SCORE: 11
ADLS_ACUITY_SCORE: 12
ADLS_ACUITY_SCORE: 11
ADLS_ACUITY_SCORE: 10
ADLS_ACUITY_SCORE: 9
ADLS_ACUITY_SCORE: 12
ADLS_ACUITY_SCORE: 9
ADLS_ACUITY_SCORE: 11
ADLS_ACUITY_SCORE: 9
ADLS_ACUITY_SCORE: 11
ADLS_ACUITY_SCORE: 12
ADLS_ACUITY_SCORE: 12
ADLS_ACUITY_SCORE: 9
ADLS_ACUITY_SCORE: 12
ADLS_ACUITY_SCORE: 9
ADLS_ACUITY_SCORE: 12
ADLS_ACUITY_SCORE: 12
ADLS_ACUITY_SCORE: 9

## 2021-11-01 ASSESSMENT — MIFFLIN-ST. JEOR: SCORE: 1037.03

## 2021-11-01 NOTE — PROGRESS NOTES
SAFETY CHECKLIST  ID Bands and Risk clasps correct and in place (DNR, Fall risk, Allergy, Latex, Limb):  Yes  All Lines Reconciled and labeled correctly: Yes  Whiteboard updated:Yes  Environmental interventions (bed/chair alarm on, call light, side rails, restraints, sitter....): Yes  Verify Tele #: na

## 2021-11-01 NOTE — PROGRESS NOTES
[facility]    Medicine Progress Note - Hospitalist Service       Date of Admission:  10/26/2021    Assessment & Plan             Acute respiratory failure with hypoxia (H)  Assessment: present on admission, secondary to Covid, possible community-acquired pneumonia and severe COPD.  Oxygen need down to 1 liter from a high of 4 liters.   Plan: Plan as detailed below with supplemental oxygen therapy.  We will also treat with ceftriaxone and azithromycin for potential concurrent bacterial pneumonia, day 7.        Pneumonia due to 2019 novel coronavirus  Assessment: Present on admission, diagnosed October 16.  She has not been treated.  She is hypoxic.  Plan: Remdesivir 5 days completed. Dexamethasone day 7. Will begin wean tomorrow.      Active Problems:    COPD (chronic obstructive pulmonary disease) (H)  Assessment: Severe COPD at baseline.  She is not O2 dependent at baseline.  Plan: Continue long-acting bronchodilator and steroid therapy, Spiriva.       Hypertension  Assessment: Chronic  Plan: Continue lisinopril and metoprolol       Hypomagnesemia  Assessment: Present on admission  Plan: Replace       Hyponatremia  Assessment: Present on admission, resolved     Malnutrition:  - Level of malnutrition: Severe   - Based on: weight loss, reduced intake, mild (or greater) subcutaneous fat loss, mild (or greater) muscle loss          Diet: Combination Diet Regular Diet Adult  Snacks/Supplements Adult: Ensure Enlive; With Meals    DVT Prophylaxis: Enoxaparin (Lovenox) SQ  Llamas Catheter: Not present  Central Lines: None  Code Status: Full Code      Disposition Plan   Expected discharge:  1-2 days recommended to transitional care unit once safe disposition plan/ TCU bed available.     The patient's care was discussed with the Patient.    Valdemar Perez MD  Hospitalist Service  [facility]  Securely message with the Vocera Web Console (learn more here)  Text page via [employer]  Paging/Directory        Clinically Significant Risk Factors Present on Admission                ______________________________________________________________________    Interval History   Feeling better. Still weak. Still dyspnea on exertion.     Data reviewed today: I reviewed all medications, new labs and imaging results over the last 24 hours. I personally reviewed no images or EKG's today.    Physical Exam   Vital Signs: Temp: 96.8  F (36  C) Temp src: Tympanic BP: 119/68 Pulse: 106   Resp: 18 SpO2: 92 % O2 Device: Nasal cannula Oxygen Delivery: 1 LPM  Weight: 111 lbs 3.2 oz  GENERAL: Comfortable, no apparent distress.  CARDIOVASCULAR: regular rate and rhythm, no murmur. No lower extremity edema   RESPIRATORY: Clear to auscultation bilaterally, no wheezes or crackles.  GI: non-tender, non-distended, normal bowel sounds.   SKIN: warm periphery, no rashes      Data   Recent Labs   Lab 11/01/21  0620 10/31/21  0504 10/30/21  0601 10/29/21  0637 10/29/21  0637 10/28/21  0627 10/26/21  2153   WBC 7.0 7.8 7.7   < > 11.2*   < > 10.3   HGB 11.1* 12.3 11.2*   < > 11.2*   < > 12.3   MCV 91 90 91   < > 89   < > 87   * 607* 537*   < > 531*   < > 407   NA  --  139 140  --  139   < > 126*   POTASSIUM  --  4.8 5.1  --  5.0   < > 3.7   CHLORIDE  --  102 106  --  105   < > 108*   CO2  --  30 27  --  27   < > 28   BUN  --  33* 37*  --  55*   < > 30*   CR  --  0.74 0.64  --  1.02   < > 0.93   ANIONGAP  --  7 7  --  7   < > <1*   DARRIUS  --  9.4 9.0  --  9.1   < > 9.3   GLC  --  119* 119*  --  135*   < > 109*   ALBUMIN  --   --   --   --   --   --  3.3*   PROTTOTAL  --   --   --   --   --   --  6.8   BILITOTAL  --   --   --   --   --   --  0.5   ALKPHOS  --   --   --   --   --   --  70   ALT  --   --   --   --   --   --  15   AST  --   --   --   --   --   --  26    < > = values in this interval not displayed.     Medications     - MEDICATION INSTRUCTIONS -       sodium chloride 10 mL/hr at 10/28/21 1252       sodium chloride  0.9%  50 mL Intravenous Once     sodium chloride 0.9%  50 mL Intravenous Q24H     aspirin  81 mg Oral Daily     atorvastatin  20 mg Oral At Bedtime     benzonatate  100 mg Oral TID     cefTRIAXone  2 g Intravenous Q24H     dexamethasone  6 mg Oral Daily     enoxaparin ANTICOAGULANT  30 mg Subcutaneous Q24H     fluticasone-vilanterol  1 puff Inhalation Daily     lidocaine  1 patch Transdermal Q24H     lidocaine   Transdermal Q8H     lisinopril  2.5 mg Oral Daily     metoprolol succinate ER  25 mg Oral Daily     nicotine  1 patch Transdermal Daily     nicotine   Transdermal Q8H     sodium chloride (PF)  3 mL Intracatheter Q8H     tiotropium  1 puff Inhalation Daily     venlafaxine  150 mg Oral Daily     venlafaxine  75 mg Oral QPM

## 2021-11-01 NOTE — PLAN OF CARE
"Pt afebrile, vss. Crackles in bilateral bases, dyspnea upon exertion, remains on 1 lpm, unable to wean this shift. Pt desaturates to mid 80's with activity, recovers quickly to 91-93%.   Pt anxious while on phone call regarding home and belongings with a family member, pt was crying, PRN xanax given.  Pt reports pain on left lateral side/back, PRN tramadol given and scheduled lidocaine patch placed, will continue to monitor.        /68 (BP Location: Left arm)   Pulse 85   Temp 98.5  F (36.9  C) (Tympanic)   Resp 20   Ht 1.702 m (5' 7\")   Wt 50.4 kg (111 lb 3.2 oz)   SpO2 93%   BMI 17.42 kg/m      "

## 2021-11-01 NOTE — PLAN OF CARE
Patient alert and oriented, vitals stable on 1.5 LPM. Denies pain. Lungs with crackles, continues to have productive cough. PRN xanax administered for anxiety/promote restful sleep. Up to bedside commode with A1. Adequate urinary output. Alma Bacon RN on 11/1/2021 at 5:59 AM

## 2021-11-01 NOTE — PROGRESS NOTES
:    I called into patients room to discuss discharge planning needs.  Patient stated she feels she cannot go home at discharge and would like to continue planning for her going to [facility] at discharge.  I called and left message for Roopa at  to see where they are at with bed availability this week.   will continue to follow.    YASMANI Mcdaniel on 11/1/2021 at 11:03 AM

## 2021-11-01 NOTE — PROGRESS NOTES
Incentive Spirometry education completed.  Pt goal 2250 mls.  Pt achieved 900 mls.  Pt instructed to perform 10/hr while awake with at least one deep breath and cough per hour until able to perform baseline activity.  RT will follow and re-assess as need.      Hood Jimenez, RT on 11/1/2021 at 3:44 PM

## 2021-11-01 NOTE — PROGRESS NOTES
"Clinical Nutrition / Reassessment     Assessment:   Client History:  Appetite still poor when recorded, however notes indicate an improved appetite. Continue with supplements and encourage intakes.   Diet Order:  Regular  Oral Intake:  Bites of toast recorded  Supplement Intake: Boost/ensure TID on trays  Weight:   Wt Readings from Last 10 Encounters:   11/01/21 50.4 kg (111 lb 3.2 oz)   08/26/21 50.6 kg (111 lb 9.6 oz)   12/27/19 53.5 kg (118 lb)   10/11/18 52.8 kg (116 lb 6.4 oz)   03/12/18 51 kg (112 lb 7 oz)   01/06/16 54.3 kg (119 lb 9.6 oz)   12/22/15 54 kg (119 lb)   11/30/15 54.1 kg (119 lb 3.2 oz)   11/12/15 54.7 kg (120 lb 9.6 oz)   11/04/15 54.3 kg (119 lb 12.8 oz)   Height: 5' 7\"  BMI: Body mass index is 17.42 kg/m .    Estimated nutritional needs based on:  Usual wt:  55 kg / 120 lbs   Estimated energy needs:  7323-8298 kcal/day (25-30 kcal/kg)  Estimated protein needs:  55-66 gm/day (1-1.2 g/kg)  Estimated fluid needs:  7443-5770 ml/day (1 ml/kcal)    Malnutrition Criteria:  (Need to have 2 indicators to qualify recommendation)  Energy Intake:  Acute Severe: </= 50% of estimated energy requirement for >/= 5 days  Interpretation of Weight Loss:  Chronic Severe:   >7.5% in 3 months -likely more recent wt loss with poor intakes x 2 weeks   Physical Findings:  Chronic Body Fat Loss:  severe and Chronic Muscle Mass Loss:  severe  Reduced  Strength:  Not Measured    Recommended Nutrition Diagnosis:   Severe Malnutrition in the context of acute illness or injury - based on AND/ASPEN Clinical Characterstics of Malnutrition May 2012      Nutrition Education: Nutrition education will be provided as appropriate.    Nutrition Diagnosis: Weight:  weight loss related to inadequate energy intakes as evidenced by wt down ~ 9% in past 3 months, likely more acute with decreased appetite x 2 weeks.    Intervention:  Nutrition Prescription:     Nutrition Intervention(s):Recommended general, healthful diet  1.  Meals " and Snacks: small/frequent meals/snacks   2. Medical Food Supplement:boost/ensure TID on trays    Nutrition Goal(s):  1. Pt will consume 50% or more at meals and supplements- on going  2. Pt will not have unplanned wt loss during hospitalization - on going  3. Pt will tolerate diet as ordered- on going    Monitoring and Evaluation:   Food Intake, diet tolerance, weights     Discharge Recommendation:   Nutrition Discharge Planning  recommend supplements on discharge to help maintain healthy weight.     RD will reassess in within 1-5 days or sooner.    Monse Sun RD on 11/1/2021 at 12:07 PM

## 2021-11-02 ENCOUNTER — APPOINTMENT (OUTPATIENT)
Dept: PHYSICAL THERAPY | Facility: OTHER | Age: 76
DRG: 177 | End: 2021-11-02
Payer: COMMERCIAL

## 2021-11-02 VITALS
HEIGHT: 67 IN | OXYGEN SATURATION: 94 % | WEIGHT: 111.7 LBS | RESPIRATION RATE: 20 BRPM | HEART RATE: 66 BPM | BODY MASS INDEX: 17.53 KG/M2 | SYSTOLIC BLOOD PRESSURE: 136 MMHG | TEMPERATURE: 97.4 F | DIASTOLIC BLOOD PRESSURE: 66 MMHG

## 2021-11-02 LAB
HOLD SPECIMEN: NORMAL
MAGNESIUM SERPL-MCNC: 1.7 MG/DL (ref 1.9–2.7)

## 2021-11-02 PROCEDURE — 97116 GAIT TRAINING THERAPY: CPT | Mod: GP

## 2021-11-02 PROCEDURE — 97530 THERAPEUTIC ACTIVITIES: CPT | Mod: GP

## 2021-11-02 PROCEDURE — 250N000013 HC RX MED GY IP 250 OP 250 PS 637: Performed by: INTERNAL MEDICINE

## 2021-11-02 PROCEDURE — 83735 ASSAY OF MAGNESIUM: CPT | Performed by: INTERNAL MEDICINE

## 2021-11-02 PROCEDURE — 99239 HOSP IP/OBS DSCHRG MGMT >30: CPT | Performed by: INTERNAL MEDICINE

## 2021-11-02 PROCEDURE — 94640 AIRWAY INHALATION TREATMENT: CPT

## 2021-11-02 PROCEDURE — 250N000011 HC RX IP 250 OP 636: Performed by: INTERNAL MEDICINE

## 2021-11-02 PROCEDURE — 999N000157 HC STATISTIC RCP TIME EA 10 MIN

## 2021-11-02 PROCEDURE — 36415 COLL VENOUS BLD VENIPUNCTURE: CPT | Performed by: INTERNAL MEDICINE

## 2021-11-02 RX ORDER — LISINOPRIL 2.5 MG/1
2.5 TABLET ORAL DAILY
Qty: 30 TABLET | Refills: 3 | Status: ON HOLD | OUTPATIENT
Start: 2021-11-02 | End: 2021-12-05

## 2021-11-02 RX ORDER — LIDOCAINE 50 MG/G
1 PATCH TOPICAL EVERY 24 HOURS
Qty: 30 PATCH | Refills: 0 | Status: ON HOLD | OUTPATIENT
Start: 2021-11-03 | End: 2021-12-05

## 2021-11-02 RX ORDER — TRAMADOL HYDROCHLORIDE 50 MG/1
50 TABLET ORAL EVERY 6 HOURS PRN
Qty: 30 TABLET | Refills: 0 | Status: SHIPPED | OUTPATIENT
Start: 2021-11-02

## 2021-11-02 RX ORDER — NICOTINE 21 MG/24HR
1 PATCH, TRANSDERMAL 24 HOURS TRANSDERMAL DAILY
Qty: 14 PATCH | Refills: 0 | Status: ON HOLD | OUTPATIENT
Start: 2021-11-02 | End: 2021-12-05

## 2021-11-02 RX ORDER — PREDNISONE 10 MG/1
TABLET ORAL
Qty: 20 TABLET | Refills: 0 | Status: ON HOLD | OUTPATIENT
Start: 2021-11-02 | End: 2021-12-05

## 2021-11-02 RX ADMIN — ENOXAPARIN SODIUM 30 MG: 100 INJECTION SUBCUTANEOUS at 09:25

## 2021-11-02 RX ADMIN — TIOTROPIUM BROMIDE INHALATION SPRAY 1 PUFF: 3.12 SPRAY, METERED RESPIRATORY (INHALATION) at 06:57

## 2021-11-02 RX ADMIN — METOPROLOL SUCCINATE 25 MG: 25 TABLET, EXTENDED RELEASE ORAL at 09:24

## 2021-11-02 RX ADMIN — VENLAFAXINE HYDROCHLORIDE 150 MG: 75 CAPSULE, EXTENDED RELEASE ORAL at 09:24

## 2021-11-02 RX ADMIN — BENZONATATE 100 MG: 100 CAPSULE ORAL at 08:17

## 2021-11-02 RX ADMIN — TRAMADOL HYDROCHLORIDE 50 MG: 50 TABLET, FILM COATED ORAL at 08:17

## 2021-11-02 RX ADMIN — FLUTICASONE FUROATE AND VILANTEROL TRIFENATATE 1 PUFF: 200; 25 POWDER RESPIRATORY (INHALATION) at 06:57

## 2021-11-02 RX ADMIN — LISINOPRIL 2.5 MG: 2.5 TABLET ORAL at 09:24

## 2021-11-02 RX ADMIN — ASPIRIN 81 MG: 81 TABLET ORAL at 09:24

## 2021-11-02 RX ADMIN — Medication 1 PATCH: at 09:27

## 2021-11-02 RX ADMIN — LIDOCAINE 5% 1 PATCH: 700 PATCH TOPICAL at 09:27

## 2021-11-02 RX ADMIN — CEFTRIAXONE SODIUM 2 G: 2 INJECTION, SOLUTION INTRAVENOUS at 09:26

## 2021-11-02 ASSESSMENT — ACTIVITIES OF DAILY LIVING (ADL)
ADLS_ACUITY_SCORE: 12

## 2021-11-02 ASSESSMENT — MIFFLIN-ST. JEOR: SCORE: 1039.3

## 2021-11-02 NOTE — PLAN OF CARE
Physical Therapy Discharge Summary    Reason for therapy discharge:    Discharged to transitional care facility.    Progress towards therapy goal(s). See goals on Care Plan in [facility] electronic health record for goal details.  Goals partially met.  Barriers to achieving goals:   discharge from facility.    Therapy recommendation(s):    Continued therapy is recommended.  Rationale/Recommendations:  to promote return to prior level of functional activity/gait tolerance.

## 2021-11-02 NOTE — PHARMACY - DISCHARGE MEDICATION RECONCILIATION AND EDUCATION
Pharmacy:  Discharge Counseling and Medication Reconciliation    Jaida Nieto  [address]   [facility] [place] [address]  [phone] (home) [phone] (work)  76 year old female  PCP: Osiel Calderon    Allergies: Gabapentin, Latex, Morphine, Codeine, Diphenhydramine, Nortriptyline, and Sucralfate    Discharge Counseling:    Patient is going to [facility], where nursing staff administer medications. Pharmacist DID NOT MEET with patient (and/or family) today to review the medication portion of the After Visit Summary.    Summary of Education: provided printed handouts for nursing staff    Materials Provided:  MedCounselor sheets printed from Clinical Pharmacology on: lidocaine patch, fluticasone/vilanterol, nicotine patch, prednisone, rivaroxaban, and tramadol    Discharge Medication Reconciliation:    It has been determined that the patient has an adequate supply of medications available or which can be obtained from the patient's preferred pharmacy, which staff has confirmed as: [phone]. An updated medication list will be faxed to the patient's pharmacy.    Thank you for the consult.    Diana Sanchez [facility]........November 2, 2021 10:58 AM

## 2021-11-02 NOTE — PROGRESS NOTES
Respiratory care has instructed patient and followed patient for minimum of 3 days (BID) on IS use and technique.  Patient is able to perform IS independently and has been instructed to continue to use it until he/she is back to their normal activity level or for length of time determined by provider.  RT had complete IS order..

## 2021-11-02 NOTE — PROGRESS NOTES
Pt stable throughout the night. Remains onO2 @ 1lpm, maintaining SpO2 level > 90%. Breo and Spiriva inhalers administered as ordered. Continue to monitor.

## 2021-11-02 NOTE — PLAN OF CARE
"PT afebrile, vss. Lungs clear, diminished in bases, infrequent dry cough. O2 91-94% on acute 1 lpm nc, dyspnea upon exertion present.  Pt reports pain on left lower back lidocaine patch placed and and PRN tramadol given. Pt ambulated with PT / OT in room, will continue to monitor.        /66   Pulse 66   Temp 97.4  F (36.3  C) (Tympanic)   Resp 20   Ht 1.702 m (5' 7\")   Wt 50.7 kg (111 lb 11.2 oz)   SpO2 94%   BMI 17.49 kg/m      "

## 2021-11-02 NOTE — PLAN OF CARE
Pt sleeping - 4 hour vitals not obtained.  Will take VS if pt puts light on.  Mishel Emmanuel RN............................ 11/2/2021 12:22 AM      Woke pt just before 0500 and obtained VS which were stable - see flowsheets.  Pt slept majority of the night. No new complaints or concerns.  Mishel Emmanuel RN............................ 11/2/2021 5:02 AM

## 2021-11-02 NOTE — PROGRESS NOTES
Shift Summary  Pt. Received both her MDI treatments this morning. Instructed Pt. About the Incentive Spirometer. Pt. Did 900 mL on a goal of 2250 mL. Pt. On 1.5 LPM 02.

## 2021-11-02 NOTE — PROGRESS NOTES
11/01/21 1500   Signing Clinician's Name / Credentials   Signing clinician's name / credentials Abdullahi Rolle MPT   Quick Adds   Rehab Discipline PT   Quick Adds Therapeutic Exercise   Gait Training   Symptoms Noted During/After Treatment (Gait Training) fatigue;shortness of breath   Distance in Feet (Required for LE Total Joints) 20 x 2   Treatment Detail ambulation within patient's room   Young Level (Gait Training) stand-by assist   Physical Assistance Level (Gait Training) 1 person + 1 person to manage equipment   Weight Bearing (Gait Training) full weight-bearing   Assistive Device (Gait Training) rolling walker   Gait Analysis Deviations decreased cande   Therapeutic Activity   Treatment Detail SBA for bed mobilities   Therapeutic Exercise   Symptoms Noted During/After Treatment fatigue   Treatment Detail standing LE exercises with Fww for slupport   PT Discharge Planning    PT Discharge Recommendation (DC Rec) Transitional Care Facility;home with home care physical therapy   PT Rationale for DC Rec to promote strength, stability and increased functional activity tolerance   PT Brief overview of current status  SBA for bed mobilities; SBA for transfers and short ambulation    Additional Documentation   PT Plan Continue PT   Total Session Time   Total Session Time (minutes) 30 minutes

## 2021-11-02 NOTE — DISCHARGE SUMMARY
[facility]  Hospitalist Discharge Summary      Date of Admission:  10/26/2021  Date of Discharge:  11/2/2021  Discharging Provider: Valdemar Perez MD      Discharge Diagnoses   Principal Problem:    Acute respiratory failure with hypoxia (H)  Active Problems:    COPD (chronic obstructive pulmonary disease) (H)    Hypertension    Hypomagnesemia    Hyponatremia    Pneumonia due to 2019 novel coronavirus  Severe malnutrition, present on admission     Follow-ups Needed After Discharge   Follow-up Appointments     Follow Up and recommended labs and tests      Hospital follow up scheduled for 11/12 @ 1425 with PCP at [facility]                 Discharge Disposition   Discharged to short-term care facility  Condition at discharge: Stable  Patient ready to discharge to a skilled nursing facility as soon as possible in order to create capacity for patients related to the COVID-19 pandemic.    Hospital Course       Jaida Nieto is a 76 year old female who has severe COPD at baseline and is unvaccinated for COVID-19 presents to the emergency department with tachypnea and hypoxia.  She was diagnosed with Covid on October 16 and not treated with anything.  She has noticed progressive dyspnea on exertion and now dyspnea at rest since that time.  She has trouble doing activities around her home.  She is not O2 dependent.     In the emergency department her O2 sats are lower than 90% on room air.  She requires 1 to 2 L to maintain oxygen saturations above 90%.  She has significant dyspnea on exertion.  A CT of the chest shows patchy multifocal pneumonia consistent with Covid, she does not have a pulmonary embolism.          Acute respiratory failure with hypoxia (H)  Present on admission, secondary to Covid, possible community-acquired pneumonia and severe COPD.  Oxygen need down to 1 liter from a high of 4 liters.   Completed 5 days remdesivir, 7 days ceftriaxone and azithromycin. 7  days of dexamethasone, and now weaning with prednisone on discharge.        Pneumonia due to 2019 novel coronavirus  Present on admission, diagnosed October 16.  She has not been treated.  She is hypoxic.  Remdesivir 5 days completed. Dexamethasone day 7. Will begin wean tomorrow.      Active Problems:    COPD (chronic obstructive pulmonary disease) (H)  Severe COPD at baseline.  She is not O2 dependent at baseline.  Continue long-acting bronchodilator and steroid therapy with Breo, Spiriva.       Hypertension  Chronic  Continue lisinopril and metoprolol       Hypomagnesemia       Hyponatremia     Malnutrition:  - Level of malnutrition: Severe   - Based on: weight loss, reduced intake, mild (or greater) subcutaneous fat loss, mild (or greater) muscle loss         Consultations This Hospital Stay   RESPIRATORY CARE IP CONSULT  SOCIAL WORK IP CONSULT  PHYSICAL THERAPY ADULT IP CONSULT  OCCUPATIONAL THERAPY ADULT IP CONSULT  PHYSICAL THERAPY ADULT IP CONSULT  OCCUPATIONAL THERAPY ADULT IP CONSULT  SMOKING CESSATION PROGRAM IP CONSULT    Code Status   Full Code    Time Spent on this Encounter   I, Valdemar Perez MD, personally saw the patient today and spent greater than 30 minutes discharging this patient.       Valdemar Perez MD  [facility]  [address]  [place] [address]  Phone: [phone]  Fax: [phone]  ______________________________________________________________________    Physical Exam   Vital Signs: Temp: 97.4  F (36.3  C) Temp src: Tympanic BP: 136/66 Pulse: 66   Resp: 20 SpO2: 94 % O2 Device: Nasal cannula Oxygen Delivery: 1 LPM  Weight: 111 lbs 11.2 oz  GENERAL: Comfortable, no apparent distress.  CARDIOVASCULAR: regular rate and rhythm, no murmur. No lower extremity edema   RESPIRATORY: Clear to auscultation bilaterally, no wheezes or crackles.  GI: non-tender, non-distended, normal bowel sounds.   SKIN: warm periphery, no rashes         Primary Care  Physician   Osiel Calderon    Discharge Orders      General info for SNF    Length of Stay Estimate: Short Term Care: Estimated # of Days <30  Condition at Discharge: Improving  Level of care:skilled   Rehabilitation Potential: Good  Admission H&P remains valid and up-to-date: Yes  Recent Chemotherapy: N/A  Use Nursing Home Standing Orders: Yes     Mantoux instructions    Give two-step Mantoux (PPD) Per Facility Policy Yes     Follow Up and recommended labs and tests    Hospital follow up scheduled for 11/12 @ 1425 with PCP at [facility]     Reason for your hospital stay    COVID pneumonia and COPD     Activity - Up with nursing assistance     Activity - Ambulate in hallway    Every shift     Full Code     Physical Therapy Adult Consult    Evaluate and treat as clinically indicated.    Reason:  weakness     Occupational Therapy Adult Consult    Evaluate and treat as clinically indicated.    Reason:  weakness     Oxygen Adult/Peds    Oxygen Documentation:   I certify that this patient, Jaida Nieto has been under my care (or a nurse practitioner or physican's assistant working with me). This is the face-to-face encounter for oxygen medical necessity.      Jaida Nieto is now in a chronic stable state and continues to require supplemental oxygen. Patient has continued oxygen desaturation due to COPD J44.9 and COVID.    Alternative treatment(s) tried or considered and deemed clinically infective for treatment of COPD J44.9  And COVID include inhalers, steroids, pulmonary toileting, and pulmonary rehab.  If portability is ordered, is the patient mobile within the home? yes    **Patients who qualify for home O2 coverage under the CMS guidelines require ABG tests or O2 sat readings obtained closest to, but no earlier than 2 days prior to the discharge, as evidence of the need for home oxygen therapy. Testing must be performed while patient is in the chronic stable state. See notes for O2 sats.**     Diet     Follow this diet upon discharge: Orders Placed This Encounter      Snacks/Supplements Adult: Ensure Enlive; With Meals      Combination Diet Regular Diet Adult       Significant Results and Procedures   Most Recent 3 CBC's:  Recent Labs   Lab Test 11/01/21  0620 10/31/21  0504 10/30/21  0601   WBC 7.0 7.8 7.7   HGB 11.1* 12.3 11.2*   MCV 91 90 91   * 607* 537*     Most Recent 3 BMP's:  Recent Labs   Lab Test 10/31/21  0504 10/30/21  0601 10/29/21  0637    140 139   POTASSIUM 4.8 5.1 5.0   CHLORIDE 102 106 105   CO2 30 27 27   BUN 33* 37* 55*   CR 0.74 0.64 1.02   ANIONGAP 7 7 7   DARRIUS 9.4 9.0 9.1   * 119* 135*     Most Recent 2 LFT's:  Recent Labs   Lab Test 10/26/21  2153 12/27/19  1420   AST 26 15   ALT 15 12   ALKPHOS 70 79   BILITOTAL 0.5 0.3     Most Recent 6 Bacteria Isolates From Any Culture (See EPIC Reports for Culture Details):  Recent Labs   Lab Test 03/08/18  1956 03/08/18  1142   CULT Normal respiratory nanci  No further identification or sensitivity done   50,000 to 100,000 colonies/mL  Escherichia coli  *   ,   Results for orders placed or performed during the hospital encounter of 10/26/21   XR Chest Port 1 View    Narrative    PROCEDURE INFORMATION:   Exam: XR Chest   Exam date and time: 10/26/2021 9:24 PM   Age: 76 years old   Clinical indication: Shortness of breath; Additional info: Dyspnea/sob     TECHNIQUE:   Imaging protocol: XR of the chest.   Views: 1 view.     COMPARISON:   CR XR CHEST 2 VW 3/8/2018 3:09 PM     FINDINGS:   Lungs: Hyperaeration. Increased opacity at both lung bases.   Pleural spaces: Bilateral apical pleural thickening.   Heart/Mediastinum: Unremarkable. No cardiomegaly.   Vasculature: Tortuous calcified aorta. Tortuous calcified aorta.   Bones/joints: Osteopenia.       Impression    IMPRESSION:   1. COPD.   2. Bibasilar pulmonary infiltrates.   3. Old granulomatous disease.     THIS DOCUMENT HAS BEEN ELECTRONICALLY SIGNED BY MICHELLE TOPETE MD   CT  Chest Pulmonary Embolism w Contrast    Narrative    PROCEDURE INFORMATION:   Exam: CTA Chest With Contrast   Exam date and time: 10/26/2021 11:06 PM   Age: 76 years old   Clinical indication: Shortness of breath; Additional info: Covid-19 positive     TECHNIQUE:   Imaging protocol: Computed tomographic angiography of the chest with contrast.   3D rendering (Not supervised by radiologist): MIP and/or 3D reconstructed   images were created by the technologist.   Radiation optimization: All CT scans at this facility use at least one of these   dose optimization techniques: automated exposure control; mA and/or kV   adjustment per patient size (includes targeted exams where dose is matched to   clinical indication); or iterative reconstruction.   Contrast material: ISOVUE 370; Contrast volume: 100 ml; Contrast route:   INTRAVENOUS (IV);      COMPARISON:   CR XR CHEST PORT 1 VIEW 10/26/2021 9:28 PM     FINDINGS:   Pulmonary arteries: Normal. No pulmonary emboli.   Aorta: Unremarkable. No aortic aneurysm. No aortic dissection.     Lungs: There is multifocal ground-glass opacification in all lobes of the   bilateral lungs. This has a peripheral predominance.   Pleural spaces: Unremarkable. No pneumothorax. No pleural effusion.   Heart: Cardiomegaly is identified.   Lymph nodes: Unremarkable. No enlarged lymph nodes.     Bones/joints: There is old deformity of the T12 vertebral body.Degenerative   change is identified in the spine.   Soft tissues: Unremarkable.         Impression    IMPRESSION:   There is no evidence for a pulmonary artery embolus.     There is multifocal disease in the bilateral lungs consistent with the given   history of Covid 19 pneumonia.    THIS DOCUMENT HAS BEEN ELECTRONICALLY SIGNED BY CARLOS QUILES MD       Discharge Medications   Current Discharge Medication List      START taking these medications    Details   fluticasone-vilanterol (BREO ELLIPTA) 200-25 MCG/INH inhaler Inhale 1 puff into the  lungs daily  Qty: 28 each, Refills: 3    Associated Diagnoses: Chronic obstructive pulmonary disease with acute exacerbation (H)      lidocaine (LIDODERM) 5 % patch Place 1 patch onto the skin every 24 hours To prevent lidocaine toxicity, patient should be patch free for 12 hrs daily.  Qty: 30 patch, Refills: 0    Associated Diagnoses: Closed fracture of sacrum, unspecified portion of sacrum, sequela      nicotine (NICODERM CQ) 14 MG/24HR 24 hr patch Place 1 patch onto the skin daily  Qty: 14 patch, Refills: 0    Associated Diagnoses: Tobacco use disorder      predniSONE (DELTASONE) 10 MG tablet 4 tabs daily for 2 days, then 3 tabs daily for 2 days, then 2 tabs daily for 2 days, then 1 tab daily for 2 days, then stop  Qty: 20 tablet, Refills: 0    Associated Diagnoses: Chronic obstructive pulmonary disease with acute exacerbation (H)      rivaroxaban ANTICOAGULANT (XARELTO ANTICOAGULANT) 10 MG TABS tablet Take 1 tablet (10 mg) by mouth daily (with dinner)  Qty: 30 tablet, Refills: 0    Associated Diagnoses: Pneumonia due to 2019 novel coronavirus      traMADol (ULTRAM) 50 MG tablet Take 1 tablet (50 mg) by mouth every 6 hours as needed for moderate pain  Qty: 30 tablet, Refills: 0    Associated Diagnoses: Pneumonia due to 2019 novel coronavirus         CONTINUE these medications which have CHANGED    Details   lisinopril (ZESTRIL) 2.5 MG tablet Take 1 tablet (2.5 mg) by mouth daily  Qty: 30 tablet, Refills: 3    Associated Diagnoses: Hypertension, unspecified type         CONTINUE these medications which have NOT CHANGED    Details   albuterol (2.5 MG/3ML) 0.083% neb solution Inhale 1 vial into the lungs every 4 hours as needed       albuterol (VENTOLIN HFA) 108 (90 BASE) MCG/ACT Inhaler Inhale 1-2 puffs into the lungs every 4 hours as needed       aspirin EC 81 MG EC tablet Take 81 mg by mouth daily       atorvastatin (LIPITOR) 20 MG tablet Take 20 mg by mouth At Bedtime       clonazePAM (KLONOPIN) 1 MG tablet Take  1 mg by mouth 2 times daily as needed       metoprolol succinate (TOPROL-XL) 25 MG 24 hr tablet Take 25 mg by mouth daily      mineral oil-hydrophilic petrolatum (AQUAPHOR) external ointment Apply topically as needed (dry skin or rash)      Nutritional Supplements (BOOST HIGH PROTEIN) LIQD Drink one pouch 3 times daily.      omeprazole (PRILOSEC) 40 MG DR capsule Take 40 mg by mouth daily      !! order for DME Equipment being ordered: Walker 4 wheels  Treatment Diagnosis: sacral fracture  PETE: 6 months  Qty: 1 Units, Refills: 0    Associated Diagnoses: Closed nondisplaced zone III fracture of sacrum, initial encounter (H)      !! order for DME Hospital bed for recent abdominal surgery      !! Respiratory Therapy Supplies (NEBULIZER COMPRESSOR) KIT Nebulizer machine and mask/tubing      !! Respiratory Therapy Supplies (NEBULIZER COMPRESSOR) KIT Nebulizer, neb kit, neb cup and mask.  Medication: Albuterol  For home use. Length of need  for Medicare patients: 99      tiotropium (SPIRIVA) 18 MCG inhaled capsule Inhale 1 capsule into the lungs daily       triamcinolone (KENALOG) 0.1 % external cream Apply topically 2 times daily      !! venlafaxine (EFFEXOR-XR) 150 MG 24 hr capsule Take 150 mg by mouth daily Take with 75 mg capsule to equal 225 mg/day.      !! venlafaxine (EFFEXOR-XR) 75 MG 24 hr capsule Take 75 mg by mouth daily Take with 150 mg capsule to equal 225 mg/day.       !! - Potential duplicate medications found. Please discuss with provider.      STOP taking these medications       tiZANidine (ZANAFLEX) 4 MG tablet Comments:   Reason for Stopping:             Allergies   Allergies   Allergen Reactions     Gabapentin Other (See Comments) and Unknown     Felt like being plugged into light socket after taking 1 pill  Lethargy and sweaty     Latex Rash     Morphine Nausea and Vomiting and GI Disturbance     Codeine Other (See Comments) and Nausea and Vomiting     dizzy     Diphenhydramine      Other reaction(s):  Irritability  Hyper, feels awful on this     Nortriptyline      Other reaction(s): Emotional Disturbance  Made her feel funny     Sucralfate Nausea

## 2021-11-02 NOTE — PROGRESS NOTES
11/01/21 1246   Signing Clinician's Name / Credentials   Signing clinician's name / credentials Mishel Bonilla OTR/L    Quick Adds   Rehab Discipline OT   Toilet Transfer Training   Toilet Transfer [place] Level (Training) contact guard   Toilet Transfer Physical Assistance Level (Training) 1 person assist   Gait Training   Symptoms Noted During/After Treatment (Gait Training) fatigue;shortness of breath   [place] Level (Gait Training) stand-by assist   Physical Assistance Level (Gait Training) 1 person + 1 person to manage equipment   Assistive Device (Gait Training) rolling walker   Therapeutic Exercise   Treatment Detail u/e exercises while seated    Grooming Training   [place] Level (Grooming Training) stand-by assist   Toilet Training   [place] Level (Toilet Training) contact guard   Assistance (Toilet Training) 1 person assist   OT Discharge Planning    OT Discharge Recommendation (DC Rec) Transitional Care Facility   OT Rationale for DC Rec Pt will benefit from on-going OT to address independence with ADL's   OT Brief overview of current status  Pt ambulated around room with SBA/CGA, toileting with CGA, U/E exercises and tolerated well.    Additional Documentation   OT Plan cont OT    Total Session Time   Total Session Time (minutes) 30 minutes

## 2021-11-02 NOTE — PHARMACY - DISCHARGE MEDICATION RECONCILIATION AND EDUCATION
[facility]  Part of [address] [address]    November 2, 2021    Dear Pharmacist,    Your customer, Jaida Nieto, born on 1945, was recently discharged from [facility].  We have updated her medication list and want to alert you to the following:       Review of your medicines      START taking      Dose / Directions   fluticasone-vilanterol 200-25 MCG/INH inhaler  Commonly known as: BREO ELLIPTA  Used for: Chronic obstructive pulmonary disease with acute exacerbation (H)      Dose: 1 puff  Start taking on: November 3, 2021  Inhale 1 puff into the lungs daily  Quantity: 28 each  Refills: 3     nicotine 14 MG/24HR 24 hr patch  Commonly known as: NICODERM CQ  Used for: Tobacco use disorder      Dose: 1 patch  Place 1 patch onto the skin daily  Quantity: 14 patch  Refills: 0     predniSONE 10 MG tablet  Commonly known as: DELTASONE  Used for: Chronic obstructive pulmonary disease with acute exacerbation (H)      4 tabs daily for 2 days, then 3 tabs daily for 2 days, then 2 tabs daily for 2 days, then 1 tab daily for 2 days, then stop  Quantity: 20 tablet  Refills: 0     rivaroxaban ANTICOAGULANT 10 MG Tabs tablet  Commonly known as: XARELTO ANTICOAGULANT      Dose: 10 mg  Take 1 tablet (10 mg) by mouth daily (with dinner)  Quantity: 30 tablet  Refills: 0     traMADol 50 MG tablet  Commonly known as: ULTRAM      Dose: 50 mg  Take 1 tablet (50 mg) by mouth every 6 hours as needed for moderate pain  Quantity: 30 tablet  Refills: 0        CONTINUE these medicines which may have CHANGED, or have new prescriptions. If we are uncertain of the size of tablets/capsules you have at home, strength may be listed as something that might have changed.      Dose / Directions   lisinopril 2.5 MG tablet  Commonly known as: ZESTRIL  This may have changed:     medication strength    See the new instructions.  Used for: Hypertension, unspecified type       Dose: 2.5 mg  Take 1 tablet (2.5 mg) by mouth daily  Quantity: 30 tablet  Refills: 3        CONTINUE these medicines which have NOT CHANGED      Dose / Directions   * albuterol (2.5 MG/3ML) 0.083% neb solution  Commonly known as: PROVENTIL      Dose: 1 vial  Inhale 1 vial into the lungs every 4 hours as needed  Refills: 0     * Ventolin  (90 Base) MCG/ACT inhaler  Generic drug: albuterol      Dose: 1-2 puff  Inhale 1-2 puffs into the lungs every 4 hours as needed  Refills: 0     aspirin 81 MG EC tablet      Dose: 81 mg  Take 81 mg by mouth daily  Refills: 0     atorvastatin 20 MG tablet  Commonly known as: LIPITOR      Dose: 20 mg  Take 20 mg by mouth At Bedtime  Refills: 0     Boost High Protein Liqd      Drink one pouch 3 times daily.  Refills: 0     clonazePAM 1 MG tablet  Commonly known as: klonoPIN      Dose: 1 mg  Take 1 mg by mouth 2 times daily as needed  Refills: 0     metoprolol succinate ER 25 MG 24 hr tablet  Commonly known as: TOPROL-XL      Dose: 25 mg  Take 25 mg by mouth daily  Refills: 0     mineral oil-hydrophilic petrolatum external ointment      Apply topically as needed (dry skin or rash)  Refills: 0     * Nebulizer Compressor Kit      Nebulizer, neb kit, neb cup and mask.  Medication: Albuterol  For home use. Length of need  for Medicare patients: 99  Refills: 0     * Nebulizer Compressor Kit      Nebulizer machine and mask/tubing  Refills: 0     omeprazole 40 MG DR capsule  Commonly known as: priLOSEC      Dose: 40 mg  Take 40 mg by mouth daily  Refills: 0     order for DME      Hospital bed for recent abdominal surgery  Refills: 0     order for DME  Used for: Closed nondisplaced zone III fracture of sacrum, initial encounter (H)      Equipment being ordered: Walker 4 wheels  Treatment Diagnosis: sacral fracture  PETE: 6 months  Quantity: 1 Units  Refills: 0     tiotropium 18 MCG inhaled capsule  Commonly known as: SPIRIVA      Dose: 1 capsule  Inhale 1 capsule into the lungs  daily  Refills: 0     triamcinolone 0.1 % external cream  Commonly known as: KENALOG      Apply topically 2 times daily  Refills: 0     * venlafaxine 150 MG 24 hr capsule  Commonly known as: EFFEXOR-XR      Dose: 150 mg  Take 150 mg by mouth daily Take with 75 mg capsule to equal 225 mg/day.  Refills: 0     * venlafaxine 75 MG 24 hr capsule  Commonly known as: EFFEXOR-XR      Dose: 75 mg  Take 75 mg by mouth daily Take with 150 mg capsule to equal 225 mg/day.  Refills: 0         * This list has 6 medication(s) that are the same as other medications prescribed for you. Read the directions carefully, and ask your doctor or other care provider to review them with you.            STOP taking    tiZANidine 4 MG tablet  Commonly known as: ZANAFLEX              Where to get your medicines      These medications were sent to [id number] - [place], [place] - [address]  [address] [facility] [address]    Phone: [phone]     fluticasone-vilanterol 200-25 MCG/INH inhaler    lisinopril 2.5 MG tablet    nicotine 14 MG/24HR 24 hr patch    predniSONE 10 MG tablet    rivaroxaban ANTICOAGULANT 10 MG Tabs tablet    traMADol 50 MG tablet         We also reviewed Jaida Nieto's allergy list and updated it as needed:  Allergies: Gabapentin, Latex, Morphine, Codeine, Diphenhydramine, Nortriptyline, and Sucralfate    Thank you for continuing to care for Jaida Nieto.  We look forward to working together with you in the future.    Sincerely,  Diana Sanchez, [facility]

## 2021-11-02 NOTE — PLAN OF CARE
Occupational Therapy Discharge Summary    Reason for therapy discharge:    Discharged to transitional care facility.    Progress towards therapy goal(s). See goals on Care Plan in [facility] electronic health record for goal details.  Goals partially met.  Barriers to achieving goals:   discharge from facility.    Therapy recommendation(s):    Continued therapy is recommended.  Rationale/Recommendations:  pt will benefit from on-going therapy at [facility] to maximize level of independence needed to return to apt as previous.

## 2021-11-02 NOTE — PHARMACY - DISCHARGE MEDICATION RECONCILIATION AND EDUCATION
[facility]  Part of [facility]  [address] [place] [address] [place] [address]    November 2, 2021    Dear Pharmacist,    Your customer, Jaida Nieto, born on 1945, was recently discharged from [facility].  We have updated her medication list and want to alert you to the following:       Review of your medicines      START taking      Dose / Directions   fluticasone-vilanterol 200-25 MCG/INH inhaler  Commonly known as: BREO ELLIPTA  Used for: Chronic obstructive pulmonary disease with acute exacerbation (H)      Dose: 1 puff  Start taking on: November 3, 2021  Inhale 1 puff into the lungs daily  Quantity: 28 each  Refills: 3     lidocaine 5 % patch  Commonly known as: LIDODERM  Used for: Closed fracture of sacrum, unspecified portion of sacrum, sequela      Dose: 1 patch  Start taking on: November 3, 2021  Place 1 patch onto the skin every 24 hours To prevent lidocaine toxicity, patient should be patch free for 12 hrs daily.  Quantity: 30 patch  Refills: 0     nicotine 14 MG/24HR 24 hr patch  Commonly known as: NICODERM CQ  Used for: Tobacco use disorder      Dose: 1 patch  Place 1 patch onto the skin daily  Quantity: 14 patch  Refills: 0     predniSONE 10 MG tablet  Commonly known as: DELTASONE  Used for: Chronic obstructive pulmonary disease with acute exacerbation (H)      4 tabs daily for 2 days, then 3 tabs daily for 2 days, then 2 tabs daily for 2 days, then 1 tab daily for 2 days, then stop  Quantity: 20 tablet  Refills: 0     rivaroxaban ANTICOAGULANT 10 MG Tabs tablet  Commonly known as: XARELTO ANTICOAGULANT      Dose: 10 mg  Take 1 tablet (10 mg) by mouth daily (with dinner)  Quantity: 30 tablet  Refills: 0     traMADol 50 MG tablet  Commonly known as: ULTRAM      Dose: 50 mg  Take 1 tablet (50 mg) by mouth every 6 hours as needed for moderate pain  Quantity: 30 tablet  Refills: 0        CONTINUE these medicines which may have CHANGED, or have new  prescriptions. If we are uncertain of the size of tablets/capsules you have at home, strength may be listed as something that might have changed.      Dose / Directions   lisinopril 2.5 MG tablet  Commonly known as: ZESTRIL  This may have changed:     medication strength    See the new instructions.  Used for: Hypertension, unspecified type      Dose: 2.5 mg  Take 1 tablet (2.5 mg) by mouth daily  Quantity: 30 tablet  Refills: 3        CONTINUE these medicines which have NOT CHANGED      Dose / Directions   * albuterol (2.5 MG/3ML) 0.083% neb solution  Commonly known as: PROVENTIL      Dose: 1 vial  Inhale 1 vial into the lungs every 4 hours as needed  Refills: 0     * Ventolin  (90 Base) MCG/ACT inhaler  Generic drug: albuterol      Dose: 1-2 puff  Inhale 1-2 puffs into the lungs every 4 hours as needed  Refills: 0     aspirin 81 MG EC tablet      Dose: 81 mg  Take 81 mg by mouth daily  Refills: 0     atorvastatin 20 MG tablet  Commonly known as: LIPITOR      Dose: 20 mg  Take 20 mg by mouth At Bedtime  Refills: 0     Boost High Protein Liqd      Drink one pouch 3 times daily.  Refills: 0     clonazePAM 1 MG tablet  Commonly known as: klonoPIN      Dose: 1 mg  Take 1 mg by mouth 2 times daily as needed  Refills: 0     metoprolol succinate ER 25 MG 24 hr tablet  Commonly known as: TOPROL-XL      Dose: 25 mg  Take 25 mg by mouth daily  Refills: 0     mineral oil-hydrophilic petrolatum external ointment      Apply topically as needed (dry skin or rash)  Refills: 0     * Nebulizer Compressor Kit      Nebulizer, neb kit, neb cup and mask.  Medication: Albuterol  For home use. Length of need  for Medicare patients: 99  Refills: 0     * Nebulizer Compressor Kit      Nebulizer machine and mask/tubing  Refills: 0     omeprazole 40 MG DR capsule  Commonly known as: priLOSEC      Dose: 40 mg  Take 40 mg by mouth daily  Refills: 0     order for [facility]      Hospital bed for recent abdominal surgery  Refills: 0     order for  DME  Used for: Closed nondisplaced zone III fracture of sacrum, initial encounter (H)      Equipment being ordered: Walker 4 wheels  Treatment Diagnosis: sacral fracture  PETE: 6 months  Quantity: 1 Units  Refills: 0     tiotropium 18 MCG inhaled capsule  Commonly known as: SPIRIVA      Dose: 1 capsule  Inhale 1 capsule into the lungs daily  Refills: 0     triamcinolone 0.1 % external cream  Commonly known as: KENALOG      Apply topically 2 times daily  Refills: 0     * venlafaxine 150 MG 24 hr capsule  Commonly known as: EFFEXOR-XR      Dose: 150 mg  Take 150 mg by mouth daily Take with 75 mg capsule to equal 225 mg/day.  Refills: 0     * venlafaxine 75 MG 24 hr capsule  Commonly known as: EFFEXOR-XR      Dose: 75 mg  Take 75 mg by mouth daily Take with 150 mg capsule to equal 225 mg/day.  Refills: 0         * This list has 6 medication(s) that are the same as other medications prescribed for you. Read the directions carefully, and ask your doctor or other care provider to review them with you.            STOP taking    tiZANidine 4 MG tablet  Commonly known as: ZANAFLEX              Where to get your medicines      These medications [facility] sent to [id number] - [place], [place]  [address]  [address] [facility] [address]    Phone: [phone]     fluticasone-vilanterol 200-25 MCG/INH inhaler    lidocaine 5 % patch    lisinopril 2.5 MG tablet    nicotine 14 MG/24HR 24 hr patch    predniSONE 10 MG tablet    rivaroxaban ANTICOAGULANT 10 MG Tabs tablet    traMADol 50 MG tablet         We also reviewed Jaida Nieto's allergy list and updated it as needed:  Allergies: Gabapentin, Latex, Morphine, Codeine, Diphenhydramine, Nortriptyline, and Sucralfate    Thank you for continuing to care for Jaida Nieto.  We look forward to working together with you in the future.    Sincerely,  Diana Sanchez, [facility]

## 2021-11-02 NOTE — PROGRESS NOTES
NSG DISCHARGE NOTE    Patient discharged to short-term care facility at 11:27 AM via wheel chair. Accompanied by staff. Discharge instructions reviewed with patient, opportunity offered to ask questions. Prescriptions sent to patients preferred pharmacy. All belongings sent with patient.    Cr Roland RN

## 2021-11-02 NOTE — PROGRESS NOTES
11/02/21 1340   Signing Clinician's Name / Credentials   Signing clinician's name / credentials Abdullahi Rolle MPT   Quick Adds   Rehab Discipline PT   Gait Training   Symptoms Noted During/After Treatment (Gait Training) fatigue   Distance in Feet (Required for LE Total Joints) 20-25    Treatment Detail ambulation within patient's room   [place] Level (Gait Training) stand-by assist   Physical Assistance Level (Gait Training) 1 person assist   Weight Bearing (Gait Training) full weight-bearing   Assistive Device (Gait Training) rolling walker   PT Discharge Planning    PT Discharge Recommendation (DC Rec) Transitional Care Facility   PT Rationale for DC Rec to promote strength, stability and increased functional activity tolerance   PT Brief overview of current status  SBA for bed mobilities; SBA for transfers and short ambulation    Additional Documentation   Rehab Comments patient will benefit from continued PT    PT Plan patient discharging to short term rehab   Total Session Time   Total Session Time (minutes) 30 minutes

## 2021-11-02 NOTE — PROGRESS NOTES
:    I received a call from Roopa at [facility] and they can accept patient today.  I checked with PT and she is an assist of 1.  Roopa will let me know what time they can transport.  Patient will need oxygen at transport.  PAS screen completed.  PAS confirmation # [id number]    YASMANI Mcdaniel on 11/2/2021 at 8:53 AM    :    Roopa from [facility] called and they can be here at 1130.  I called patient in room and gave her discharge update.  No further needs at this time.    YASMANI Mcdaniel on 11/2/2021 at 11:42 AM

## 2021-11-02 NOTE — PROGRESS NOTES
11/02/21 1328   Signing Clinician's Name / Credentials   Signing clinician's name / credentials Mishel Bonilla OTR/L    Quick Adds   Rehab Discipline OT   Toilet Transfer Training   Toilet Transfer [place] Level (Training) contact guard   Toilet Transfer Physical Assistance Level (Training) 1 person assist   Gait Training   [place] Level (Gait Training) stand-by assist   Physical Assistance Level (Gait Training) 1 person + 1 person to manage equipment   Assistive Device (Gait Training) rolling walker   Grooming Training   [place] Level (Grooming Training) stand-by assist   Assistance (Grooming Training) supervision   Bathing Training   [place] Level (Bathing Training) contact guard   Assistance (Bathing Training) 1 person assist   Upper Body Dressing Training   [place] Level (Upper Body Dressing Training) stand-by assist   Assistance (Upper Body Dressing Training) supervision   Lower Body Dressing Training   Lower Body Dressing Training Assistance minimum assist (75% patient effort)   Toilet Training   [place] Level (Toilet Training) stand-by assist   Assistance (Toilet Training) supervision   OT Discharge Planning    OT Discharge Recommendation (DC Rec) Transitional Care Facility   OT Rationale for DC Rec Pt would continue to benefit from on-going therapies to maximize strength and endurance needed to return to apt as previous    OT Brief overview of current status  Pt has progressed well, completed full shower today with very minimal assist, ambualting in room with FWW and CGA    Additional Documentation   OT Plan d/c to STR today    Total Session Time   Total Session Time (minutes) 75 minutes

## 2021-11-03 ENCOUNTER — PATIENT OUTREACH (OUTPATIENT)
Dept: CARE COORDINATION | Facility: CLINIC | Age: 76
End: 2021-11-03

## 2021-11-03 ENCOUNTER — NURSE TRIAGE (OUTPATIENT)
Dept: NURSING | Facility: CLINIC | Age: 76
End: 2021-11-03

## 2021-11-03 RX ORDER — ALPRAZOLAM 0.5 MG
0.5 TABLET ORAL 3 TIMES DAILY PRN
Qty: 30 TABLET | Refills: 0 | Status: SHIPPED | OUTPATIENT
Start: 2021-11-03

## 2021-11-03 RX ORDER — LORAZEPAM 0.5 MG/1
0.5 TABLET ORAL EVERY 6 HOURS PRN
Qty: 6 TABLET | Refills: 0 | Status: ON HOLD | OUTPATIENT
Start: 2021-11-03 | End: 2021-12-05

## 2021-11-03 NOTE — TELEPHONE ENCOUNTER
Patient is calling from a nursing home upset stating she was discharged from the hospital and has been taking off of Klonopin medication for her anxiety. Patient is demanding to speak with her provider. Patient does not have a Pcp via [employer]. Triager spoke with the Nurse at the nursing home and she states the providers are aware of patients request and they are working on it.  Triage guidelines recommend to call pcp when office opens  COVID 19 Nurse Triage Plan/Patient Instructions    Please be aware that novel coronavirus (COVID-19) may be circulating in the community. If you develop symptoms such as fever, cough, or SOB or if you have concerns about the presence of another infection including coronavirus (COVID-19), please contact your health care provider or visit [email or web address].     Disposition/Instructions    Virtual Visit with provider recommended. Reference Visit Selection Guide.    Thank you for taking steps to prevent the spread of this virus.  o Limit your contact with others.  o Wear a simple mask to cover your cough.  o Wash your hands well and often.    Resources    [facility]: About COVID-19: [email or web address]    CDC: What to Do If You're Sick: www.cdc.gov/coronavirus/2019-ncov/about/steps-when-sick.html    CDC: Ending Home Isolation: www.cdc.gov/coronavirus/2019-ncov/hcp/disposition-in-home-patients.html     CDC: Caring for Someone: www.cdc.gov/coronavirus/2019-ncov/if-you-are-sick/care-for-someone.html     [facility]: Interim Guidance for Hospital Discharge to Home: www.health.[facility].[place]/diseases/coronavirus/hcp/hospdischarge.pdf    [facility] clinical trials (COVID-19 research studies): [email or web address]     Below are the COVID-19 hotlines at the [facility] ([facility]). Interpreters are available.   o For health questions: Call 424-491-6830 or 1-737.781.5175 (7 a.m. to 7 p.m.)  o For questions about schools and  "childcare: Call 203-517-8572 or 1-975.554.7608 (7 a.m. to 7 p.m.)                       Reason for Disposition    Caller requesting a CONTROLLED substance prescription refill (e.g., narcotics, ADHD medicines)    Additional Information    Negative: Drug overdose and triager unable to answer question    Negative: Caller requesting information unrelated to medicine    Negative: Caller requesting a prescription for Strep throat and has a positive culture result    Negative: Rash while taking a medication or within 3 days of stopping it    Negative: Immunization reaction suspected    Negative: [1] Asthma and [2] having symptoms of asthma (cough, wheezing, etc.)    Negative: [1] Influenza symptoms AND [2] anti-viral med prescription request, such as Tamiflu    Negative: [1] Symptom of illness (e.g., headache, abdominal pain, earache, vomiting) AND [2] more than mild    Negative: MORE THAN A DOUBLE DOSE of a prescription or over-the-counter (OTC) drug    Negative: [1] DOUBLE DOSE (an extra dose or lesser amount) of over-the-counter (OTC) drug AND [2] any symptoms (e.g., dizziness, nausea, pain, sleepiness)    Negative: [1] DOUBLE DOSE (an extra dose or lesser amount) of prescription drug AND [2] any symptoms (e.g., dizziness, nausea, pain, sleepiness)    Negative: Took another person's prescription drug    Negative: [1] DOUBLE DOSE (an extra dose or lesser amount) of prescription drug AND [2] NO symptoms (Exception: a double dose of antibiotics)    Negative: Diabetes drug error or overdose (e.g., took wrong type of insulin or took extra dose)    Negative: [1] Request for URGENT new prescription or refill of \"essential\" medication (i.e., likelihood of harm to patient if not taken) AND [2] triager unable to fill per unit policy    Negative: [1] Prescription not at pharmacy AND [2] was prescribed by PCP recently    Negative: [1] Pharmacy calling with prescription questions AND [2] triager unable to answer question    " Negative: [1] Caller has URGENT medication question about med that PCP or specialist prescribed AND [2] triager unable to answer question    Negative: [1] Caller has NON-URGENT medication question about med that PCP prescribed AND [2] triager unable to answer question    Negative: [1] Caller requesting a NON-URGENT new prescription or refill AND [2] triager unable to refill per unit policy    Negative: [1] Caller has medication question about med not prescribed by PCP AND [2] triager unable to answer question (e.g., compatibility with other med, storage)    Protocols used: MEDICATION QUESTION CALL-A-

## 2021-11-03 NOTE — PROGRESS NOTES
Clinic Care Coordination Contact    Patient has PCP elsewhere, no follow-up here. No TCM call required per policy.  Regine Cummings RN ,....................  11/3/2021   8:27 AM

## 2021-11-09 ENCOUNTER — LAB REQUISITION (OUTPATIENT)
Dept: LAB | Facility: OTHER | Age: 76
End: 2021-11-09
Payer: COMMERCIAL

## 2021-11-09 DIAGNOSIS — R30.0 DYSURIA: ICD-10-CM

## 2021-11-09 LAB
ALBUMIN UR-MCNC: NEGATIVE MG/DL
APPEARANCE UR: CLEAR
BILIRUB UR QL STRIP: NEGATIVE
COLOR UR AUTO: YELLOW
GLUCOSE UR STRIP-MCNC: NEGATIVE MG/DL
HGB UR QL STRIP: NEGATIVE
KETONES UR STRIP-MCNC: NEGATIVE MG/DL
LEUKOCYTE ESTERASE UR QL STRIP: NEGATIVE
NITRATE UR QL: NEGATIVE
PH UR STRIP: 6 [PH] (ref 5–9)
SP GR UR STRIP: 1.01 (ref 1–1.03)
UROBILINOGEN UR STRIP-MCNC: NORMAL MG/DL

## 2021-11-09 PROCEDURE — 81003 URINALYSIS AUTO W/O SCOPE: CPT | Performed by: NURSE PRACTITIONER

## 2021-12-04 ENCOUNTER — HOSPITAL ENCOUNTER (INPATIENT)
Facility: OTHER | Age: 76
LOS: 4 days | Discharge: HOME HEALTH | DRG: 194 | End: 2021-12-08
Attending: EMERGENCY MEDICINE | Admitting: FAMILY MEDICINE
Payer: COMMERCIAL

## 2021-12-04 ENCOUNTER — APPOINTMENT (OUTPATIENT)
Dept: GENERAL RADIOLOGY | Facility: OTHER | Age: 76
DRG: 194 | End: 2021-12-04
Attending: EMERGENCY MEDICINE
Payer: COMMERCIAL

## 2021-12-04 DIAGNOSIS — N28.9 RENAL INSUFFICIENCY: ICD-10-CM

## 2021-12-04 DIAGNOSIS — E11.9 TYPE 2 DIABETES MELLITUS WITHOUT COMPLICATION, UNSPECIFIED WHETHER LONG TERM INSULIN USE (H): ICD-10-CM

## 2021-12-04 DIAGNOSIS — J12.82 PNEUMONIA DUE TO 2019 NOVEL CORONAVIRUS: Primary | ICD-10-CM

## 2021-12-04 DIAGNOSIS — I10 PRIMARY HYPERTENSION: ICD-10-CM

## 2021-12-04 DIAGNOSIS — Z79.51 LONG TERM CURRENT USE OF INHALED STEROID: ICD-10-CM

## 2021-12-04 DIAGNOSIS — N28.9 URETERAL SLUDGE: ICD-10-CM

## 2021-12-04 DIAGNOSIS — I25.10 ATHEROSCLEROSIS OF NATIVE CORONARY ARTERY WITHOUT ANGINA PECTORIS, UNSPECIFIED WHETHER NATIVE OR TRANSPLANTED HEART: ICD-10-CM

## 2021-12-04 DIAGNOSIS — E78.5 HYPERLIPIDEMIA, UNSPECIFIED HYPERLIPIDEMIA TYPE: ICD-10-CM

## 2021-12-04 DIAGNOSIS — J18.9 PNEUMONIA OF LEFT LUNG DUE TO INFECTIOUS ORGANISM, UNSPECIFIED PART OF LUNG: ICD-10-CM

## 2021-12-04 DIAGNOSIS — I10 ESSENTIAL HYPERTENSION, MALIGNANT: ICD-10-CM

## 2021-12-04 DIAGNOSIS — Z79.899 NEED FOR PROPHYLACTIC CHEMOTHERAPY: ICD-10-CM

## 2021-12-04 DIAGNOSIS — Z79.01 LONG TERM (CURRENT) USE OF ANTICOAGULANTS: ICD-10-CM

## 2021-12-04 DIAGNOSIS — F17.210 CIGARETTE SMOKER: ICD-10-CM

## 2021-12-04 DIAGNOSIS — Z79.82 ENCOUNTER FOR LONG-TERM (CURRENT) USE OF ASPIRIN: ICD-10-CM

## 2021-12-04 DIAGNOSIS — Z11.52 ENCOUNTER FOR SCREENING LABORATORY TESTING FOR COVID-19 VIRUS: ICD-10-CM

## 2021-12-04 DIAGNOSIS — U07.1 PNEUMONIA DUE TO 2019 NOVEL CORONAVIRUS: Primary | ICD-10-CM

## 2021-12-04 PROBLEM — N17.9 ACUTE KIDNEY INJURY (H): Status: ACTIVE | Noted: 2021-12-04

## 2021-12-04 LAB
ALBUMIN SERPL-MCNC: 3.1 G/DL (ref 3.5–5.7)
ALP SERPL-CCNC: 88 U/L (ref 34–104)
ALT SERPL W P-5'-P-CCNC: 10 U/L (ref 7–52)
ANION GAP SERPL CALCULATED.3IONS-SCNC: 11 MMOL/L (ref 3–14)
AST SERPL W P-5'-P-CCNC: 13 U/L (ref 13–39)
BASE EXCESS BLDA CALC-SCNC: -4.2 MMOL/L (ref -9–1.8)
BASOPHILS # BLD AUTO: 0.1 10E3/UL (ref 0–0.2)
BASOPHILS NFR BLD AUTO: 0 %
BILIRUB SERPL-MCNC: 0.4 MG/DL (ref 0.3–1)
BUN SERPL-MCNC: 39 MG/DL (ref 7–25)
CALCIUM SERPL-MCNC: 9.6 MG/DL (ref 8.6–10.3)
CHLORIDE BLD-SCNC: 95 MMOL/L (ref 98–107)
CO2 SERPL-SCNC: 26 MMOL/L (ref 21–31)
CREAT SERPL-MCNC: 2.67 MG/DL (ref 0.6–1.2)
EOSINOPHIL # BLD AUTO: 0 10E3/UL (ref 0–0.7)
EOSINOPHIL NFR BLD AUTO: 0 %
ERYTHROCYTE [DISTWIDTH] IN BLOOD BY AUTOMATED COUNT: 15.6 % (ref 10–15)
FLUAV RNA SPEC QL NAA+PROBE: NEGATIVE
FLUBV RNA RESP QL NAA+PROBE: NEGATIVE
GFR SERPL CREATININE-BSD FRML MDRD: 17 ML/MIN/1.73M2
GLUCOSE BLD-MCNC: 113 MG/DL (ref 70–105)
HCO3 BLD-SCNC: 22 MMOL/L (ref 21–28)
HCT VFR BLD AUTO: 33.5 % (ref 35–47)
HGB BLD-MCNC: 10.8 G/DL (ref 11.7–15.7)
IMM GRANULOCYTES # BLD: 0.3 10E3/UL
IMM GRANULOCYTES NFR BLD: 2 %
LACTATE SERPL-SCNC: 1.6 MMOL/L (ref 0.7–2)
LYMPHOCYTES # BLD AUTO: 1.3 10E3/UL (ref 0.8–5.3)
LYMPHOCYTES NFR BLD AUTO: 7 %
MAGNESIUM SERPL-MCNC: 1.4 MG/DL (ref 1.9–2.7)
MCH RBC QN AUTO: 29.7 PG (ref 26.5–33)
MCHC RBC AUTO-ENTMCNC: 32.2 G/DL (ref 31.5–36.5)
MCV RBC AUTO: 92 FL (ref 78–100)
MONOCYTES # BLD AUTO: 1.1 10E3/UL (ref 0–1.3)
MONOCYTES NFR BLD AUTO: 6 %
NEUTROPHILS # BLD AUTO: 16.6 10E3/UL (ref 1.6–8.3)
NEUTROPHILS NFR BLD AUTO: 85 %
NRBC # BLD AUTO: 0 10E3/UL
NRBC BLD AUTO-RTO: 0 /100
O2/TOTAL GAS SETTING VFR VENT: 0 %
OXYHGB MFR BLD: 93 % (ref 92–100)
PCO2 BLD: 44 MM HG (ref 35–45)
PH BLD: 7.31 [PH] (ref 7.35–7.45)
PLATELET # BLD AUTO: 403 10E3/UL (ref 150–450)
PO2 BLD: 71 MM HG (ref 80–105)
POTASSIUM BLD-SCNC: 4.1 MMOL/L (ref 3.5–5.1)
PROT SERPL-MCNC: 6.8 G/DL (ref 6.4–8.9)
RBC # BLD AUTO: 3.64 10E6/UL (ref 3.8–5.2)
RSV RNA SPEC NAA+PROBE: NEGATIVE
SARS-COV-2 RNA RESP QL NAA+PROBE: NEGATIVE
SODIUM SERPL-SCNC: 132 MMOL/L (ref 134–144)
WBC # BLD AUTO: 19.4 10E3/UL (ref 4–11)

## 2021-12-04 PROCEDURE — C9803 HOPD COVID-19 SPEC COLLECT: HCPCS | Performed by: EMERGENCY MEDICINE

## 2021-12-04 PROCEDURE — 82805 BLOOD GASES W/O2 SATURATION: CPT | Performed by: FAMILY MEDICINE

## 2021-12-04 PROCEDURE — 96366 THER/PROPH/DIAG IV INF ADDON: CPT | Performed by: EMERGENCY MEDICINE

## 2021-12-04 PROCEDURE — 85025 COMPLETE CBC W/AUTO DIFF WBC: CPT | Performed by: EMERGENCY MEDICINE

## 2021-12-04 PROCEDURE — 250N000011 HC RX IP 250 OP 636: Performed by: FAMILY MEDICINE

## 2021-12-04 PROCEDURE — 96368 THER/DIAG CONCURRENT INF: CPT | Performed by: EMERGENCY MEDICINE

## 2021-12-04 PROCEDURE — 80053 COMPREHEN METABOLIC PANEL: CPT | Performed by: EMERGENCY MEDICINE

## 2021-12-04 PROCEDURE — 36600 WITHDRAWAL OF ARTERIAL BLOOD: CPT | Performed by: FAMILY MEDICINE

## 2021-12-04 PROCEDURE — 250N000011 HC RX IP 250 OP 636: Performed by: EMERGENCY MEDICINE

## 2021-12-04 PROCEDURE — 99222 1ST HOSP IP/OBS MODERATE 55: CPT | Mod: AI | Performed by: FAMILY MEDICINE

## 2021-12-04 PROCEDURE — 71045 X-RAY EXAM CHEST 1 VIEW: CPT | Mod: TC

## 2021-12-04 PROCEDURE — 83605 ASSAY OF LACTIC ACID: CPT | Performed by: EMERGENCY MEDICINE

## 2021-12-04 PROCEDURE — 96365 THER/PROPH/DIAG IV INF INIT: CPT | Performed by: EMERGENCY MEDICINE

## 2021-12-04 PROCEDURE — 250N000013 HC RX MED GY IP 250 OP 250 PS 637: Performed by: FAMILY MEDICINE

## 2021-12-04 PROCEDURE — 99285 EMERGENCY DEPT VISIT HI MDM: CPT | Performed by: EMERGENCY MEDICINE

## 2021-12-04 PROCEDURE — 258N000003 HC RX IP 258 OP 636: Performed by: EMERGENCY MEDICINE

## 2021-12-04 PROCEDURE — 83735 ASSAY OF MAGNESIUM: CPT | Performed by: FAMILY MEDICINE

## 2021-12-04 PROCEDURE — 99285 EMERGENCY DEPT VISIT HI MDM: CPT | Mod: 25 | Performed by: EMERGENCY MEDICINE

## 2021-12-04 PROCEDURE — 36415 COLL VENOUS BLD VENIPUNCTURE: CPT | Performed by: EMERGENCY MEDICINE

## 2021-12-04 PROCEDURE — 258N000003 HC RX IP 258 OP 636: Performed by: FAMILY MEDICINE

## 2021-12-04 PROCEDURE — 120N000001 HC R&B MED SURG/OB

## 2021-12-04 PROCEDURE — 93010 ELECTROCARDIOGRAM REPORT: CPT | Performed by: INTERNAL MEDICINE

## 2021-12-04 PROCEDURE — 87040 BLOOD CULTURE FOR BACTERIA: CPT | Performed by: EMERGENCY MEDICINE

## 2021-12-04 PROCEDURE — 250N000013 HC RX MED GY IP 250 OP 250 PS 637: Performed by: EMERGENCY MEDICINE

## 2021-12-04 PROCEDURE — 87637 SARSCOV2&INF A&B&RSV AMP PRB: CPT | Performed by: EMERGENCY MEDICINE

## 2021-12-04 PROCEDURE — 93005 ELECTROCARDIOGRAM TRACING: CPT | Performed by: EMERGENCY MEDICINE

## 2021-12-04 RX ORDER — AZITHROMYCIN 500 MG/5ML
500 INJECTION, POWDER, LYOPHILIZED, FOR SOLUTION INTRAVENOUS ONCE
Status: COMPLETED | OUTPATIENT
Start: 2021-12-04 | End: 2021-12-04

## 2021-12-04 RX ORDER — MAGNESIUM SULFATE HEPTAHYDRATE 40 MG/ML
2 INJECTION, SOLUTION INTRAVENOUS ONCE
Status: COMPLETED | OUTPATIENT
Start: 2021-12-04 | End: 2021-12-04

## 2021-12-04 RX ORDER — NICOTINE 21 MG/24HR
1 PATCH, TRANSDERMAL 24 HOURS TRANSDERMAL DAILY
Status: DISCONTINUED | OUTPATIENT
Start: 2021-12-04 | End: 2021-12-08 | Stop reason: HOSPADM

## 2021-12-04 RX ORDER — LORAZEPAM 1 MG/1
1 TABLET ORAL ONCE
Status: COMPLETED | OUTPATIENT
Start: 2021-12-04 | End: 2021-12-04

## 2021-12-04 RX ORDER — NALOXONE HYDROCHLORIDE 0.4 MG/ML
0.4 INJECTION, SOLUTION INTRAMUSCULAR; INTRAVENOUS; SUBCUTANEOUS
Status: DISCONTINUED | OUTPATIENT
Start: 2021-12-04 | End: 2021-12-08 | Stop reason: HOSPADM

## 2021-12-04 RX ORDER — ASPIRIN 81 MG/1
81 TABLET ORAL DAILY
Status: DISCONTINUED | OUTPATIENT
Start: 2021-12-05 | End: 2021-12-08 | Stop reason: HOSPADM

## 2021-12-04 RX ORDER — ALPRAZOLAM 0.5 MG
0.5 TABLET ORAL 3 TIMES DAILY PRN
Status: DISCONTINUED | OUTPATIENT
Start: 2021-12-04 | End: 2021-12-08 | Stop reason: HOSPADM

## 2021-12-04 RX ORDER — AMOXICILLIN 250 MG
1 CAPSULE ORAL 2 TIMES DAILY PRN
Status: DISCONTINUED | OUTPATIENT
Start: 2021-12-04 | End: 2021-12-08 | Stop reason: HOSPADM

## 2021-12-04 RX ORDER — CEFEPIME HYDROCHLORIDE 1 G/1
1 INJECTION, POWDER, FOR SOLUTION INTRAMUSCULAR; INTRAVENOUS ONCE
Status: COMPLETED | OUTPATIENT
Start: 2021-12-04 | End: 2021-12-04

## 2021-12-04 RX ORDER — NALOXONE HYDROCHLORIDE 0.4 MG/ML
0.2 INJECTION, SOLUTION INTRAMUSCULAR; INTRAVENOUS; SUBCUTANEOUS
Status: DISCONTINUED | OUTPATIENT
Start: 2021-12-04 | End: 2021-12-08 | Stop reason: HOSPADM

## 2021-12-04 RX ORDER — ATORVASTATIN CALCIUM 20 MG/1
20 TABLET, FILM COATED ORAL AT BEDTIME
Status: DISCONTINUED | OUTPATIENT
Start: 2021-12-04 | End: 2021-12-08 | Stop reason: HOSPADM

## 2021-12-04 RX ORDER — ACETAMINOPHEN 325 MG/1
975 TABLET ORAL EVERY 8 HOURS
Status: DISCONTINUED | OUTPATIENT
Start: 2021-12-04 | End: 2021-12-08 | Stop reason: HOSPADM

## 2021-12-04 RX ORDER — METOPROLOL SUCCINATE 25 MG/1
25 TABLET, EXTENDED RELEASE ORAL DAILY
Status: DISCONTINUED | OUTPATIENT
Start: 2021-12-05 | End: 2021-12-08 | Stop reason: HOSPADM

## 2021-12-04 RX ORDER — BISACODYL 10 MG
10 SUPPOSITORY, RECTAL RECTAL DAILY PRN
Status: DISCONTINUED | OUTPATIENT
Start: 2021-12-04 | End: 2021-12-08 | Stop reason: HOSPADM

## 2021-12-04 RX ORDER — SODIUM CHLORIDE 9 MG/ML
INJECTION, SOLUTION INTRAVENOUS CONTINUOUS
Status: DISCONTINUED | OUTPATIENT
Start: 2021-12-04 | End: 2021-12-08 | Stop reason: HOSPADM

## 2021-12-04 RX ORDER — VENLAFAXINE HYDROCHLORIDE 75 MG/1
75 CAPSULE, EXTENDED RELEASE ORAL DAILY
Status: DISCONTINUED | OUTPATIENT
Start: 2021-12-04 | End: 2021-12-08 | Stop reason: HOSPADM

## 2021-12-04 RX ORDER — LISINOPRIL 2.5 MG/1
2.5 TABLET ORAL DAILY
Status: DISCONTINUED | OUTPATIENT
Start: 2021-12-04 | End: 2021-12-06

## 2021-12-04 RX ORDER — VENLAFAXINE HYDROCHLORIDE 75 MG/1
150 CAPSULE, EXTENDED RELEASE ORAL DAILY
Status: DISCONTINUED | OUTPATIENT
Start: 2021-12-04 | End: 2021-12-08 | Stop reason: HOSPADM

## 2021-12-04 RX ORDER — TIOTROPIUM BROMIDE 18 UG/1
1 CAPSULE ORAL; RESPIRATORY (INHALATION) DAILY
Status: DISCONTINUED | OUTPATIENT
Start: 2021-12-04 | End: 2021-12-04 | Stop reason: CLARIF

## 2021-12-04 RX ORDER — ALBUTEROL SULFATE 90 UG/1
2 AEROSOL, METERED RESPIRATORY (INHALATION) EVERY 4 HOURS PRN
Status: DISCONTINUED | OUTPATIENT
Start: 2021-12-04 | End: 2021-12-04

## 2021-12-04 RX ORDER — PROCHLORPERAZINE MALEATE 5 MG
5 TABLET ORAL EVERY 6 HOURS PRN
Status: DISCONTINUED | OUTPATIENT
Start: 2021-12-04 | End: 2021-12-08 | Stop reason: HOSPADM

## 2021-12-04 RX ORDER — OMEPRAZOLE 40 MG/1
40 CAPSULE, DELAYED RELEASE ORAL DAILY
Status: DISCONTINUED | OUTPATIENT
Start: 2021-12-04 | End: 2021-12-04 | Stop reason: CLARIF

## 2021-12-04 RX ORDER — GUAIFENESIN/DEXTROMETHORPHAN 100-10MG/5
10 SYRUP ORAL EVERY 4 HOURS PRN
Status: DISCONTINUED | OUTPATIENT
Start: 2021-12-04 | End: 2021-12-08 | Stop reason: HOSPADM

## 2021-12-04 RX ORDER — ONDANSETRON 4 MG/1
4 TABLET, ORALLY DISINTEGRATING ORAL EVERY 6 HOURS PRN
Status: DISCONTINUED | OUTPATIENT
Start: 2021-12-04 | End: 2021-12-04

## 2021-12-04 RX ORDER — POLYETHYLENE GLYCOL 3350 17 G/17G
17 POWDER, FOR SOLUTION ORAL DAILY PRN
Status: DISCONTINUED | OUTPATIENT
Start: 2021-12-04 | End: 2021-12-08 | Stop reason: HOSPADM

## 2021-12-04 RX ORDER — PANTOPRAZOLE SODIUM 40 MG/1
40 TABLET, DELAYED RELEASE ORAL
Status: DISCONTINUED | OUTPATIENT
Start: 2021-12-05 | End: 2021-12-08 | Stop reason: HOSPADM

## 2021-12-04 RX ORDER — ONDANSETRON 2 MG/ML
4 INJECTION INTRAMUSCULAR; INTRAVENOUS EVERY 6 HOURS PRN
Status: DISCONTINUED | OUTPATIENT
Start: 2021-12-04 | End: 2021-12-04

## 2021-12-04 RX ORDER — AMOXICILLIN 250 MG
2 CAPSULE ORAL 2 TIMES DAILY PRN
Status: DISCONTINUED | OUTPATIENT
Start: 2021-12-04 | End: 2021-12-08 | Stop reason: HOSPADM

## 2021-12-04 RX ORDER — SODIUM CHLORIDE 9 MG/ML
INJECTION, SOLUTION INTRAVENOUS CONTINUOUS
Status: DISCONTINUED | OUTPATIENT
Start: 2021-12-04 | End: 2021-12-04

## 2021-12-04 RX ORDER — PROCHLORPERAZINE 25 MG
12.5 SUPPOSITORY, RECTAL RECTAL EVERY 12 HOURS PRN
Status: DISCONTINUED | OUTPATIENT
Start: 2021-12-04 | End: 2021-12-08 | Stop reason: HOSPADM

## 2021-12-04 RX ORDER — LIDOCAINE 40 MG/G
CREAM TOPICAL
Status: DISCONTINUED | OUTPATIENT
Start: 2021-12-04 | End: 2021-12-08 | Stop reason: HOSPADM

## 2021-12-04 RX ORDER — ALBUTEROL SULFATE 0.83 MG/ML
2.5 SOLUTION RESPIRATORY (INHALATION) EVERY 4 HOURS PRN
Status: DISCONTINUED | OUTPATIENT
Start: 2021-12-04 | End: 2021-12-08 | Stop reason: HOSPADM

## 2021-12-04 RX ORDER — TRAMADOL HYDROCHLORIDE 50 MG/1
50 TABLET ORAL EVERY 6 HOURS PRN
Status: DISCONTINUED | OUTPATIENT
Start: 2021-12-04 | End: 2021-12-08 | Stop reason: HOSPADM

## 2021-12-04 RX ADMIN — SODIUM CHLORIDE: 9 INJECTION, SOLUTION INTRAVENOUS at 14:45

## 2021-12-04 RX ADMIN — VENLAFAXINE HYDROCHLORIDE: 75 CAPSULE, EXTENDED RELEASE ORAL at 14:44

## 2021-12-04 RX ADMIN — CEFEPIME HYDROCHLORIDE 1 G: 1 INJECTION, POWDER, FOR SOLUTION INTRAMUSCULAR; INTRAVENOUS at 12:46

## 2021-12-04 RX ADMIN — ACETAMINOPHEN 975 MG: 325 TABLET, FILM COATED ORAL at 14:44

## 2021-12-04 RX ADMIN — MAGNESIUM SULFATE HEPTAHYDRATE 2 G: 40 INJECTION, SOLUTION INTRAVENOUS at 20:10

## 2021-12-04 RX ADMIN — NICOTINE 1 PATCH: 14 PATCH, EXTENDED RELEASE TRANSDERMAL at 14:45

## 2021-12-04 RX ADMIN — ATORVASTATIN CALCIUM 20 MG: 20 TABLET, FILM COATED ORAL at 22:19

## 2021-12-04 RX ADMIN — SODIUM CHLORIDE: 9 INJECTION, SOLUTION INTRAVENOUS at 10:57

## 2021-12-04 RX ADMIN — SODIUM CHLORIDE: 9 INJECTION, SOLUTION INTRAVENOUS at 22:20

## 2021-12-04 RX ADMIN — SODIUM CHLORIDE 1000 ML: 9 INJECTION, SOLUTION INTRAVENOUS at 20:11

## 2021-12-04 RX ADMIN — TIOTROPIUM BROMIDE INHALATION SPRAY 2 PUFF: 3.12 SPRAY, METERED RESPIRATORY (INHALATION) at 15:36

## 2021-12-04 RX ADMIN — VENLAFAXINE HYDROCHLORIDE: 75 CAPSULE, EXTENDED RELEASE ORAL at 14:45

## 2021-12-04 RX ADMIN — FLUTICASONE FUROATE AND VILANTEROL TRIFENATATE 1 PUFF: 200; 25 POWDER RESPIRATORY (INHALATION) at 15:38

## 2021-12-04 RX ADMIN — LORAZEPAM 1 MG: 1 TABLET ORAL at 12:21

## 2021-12-04 RX ADMIN — AZITHROMYCIN MONOHYDRATE 500 MG: 500 INJECTION, POWDER, LYOPHILIZED, FOR SOLUTION INTRAVENOUS at 10:57

## 2021-12-04 RX ADMIN — ACETAMINOPHEN 975 MG: 325 TABLET, FILM COATED ORAL at 22:19

## 2021-12-04 ASSESSMENT — ACTIVITIES OF DAILY LIVING (ADL)
WHICH_OF_THE_ABOVE_FUNCTIONAL_RISKS_HAD_A_RECENT_ONSET_OR_CHANGE?: AMBULATION
ADLS_ACUITY_SCORE: 10
DIFFICULTY_COMMUNICATING: NO
CONCENTRATING,_REMEMBERING_OR_MAKING_DECISIONS_DIFFICULTY: NO
WEAR_GLASSES_OR_BLIND: YES
ADLS_ACUITY_SCORE: 10
CONCENTRATING,_REMEMBERING_OR_MAKING_DECISIONS_DIFFICULTY: YES
ADLS_ACUITY_SCORE: 10
WALKING_OR_CLIMBING_STAIRS: AMBULATION DIFFICULTY, REQUIRES EQUIPMENT
ADLS_ACUITY_SCORE: 10
HEARING_DIFFICULTY_OR_DEAF: YES
ADLS_ACUITY_SCORE: 10
TOILETING_ISSUES: NO
ADLS_ACUITY_SCORE: 10
DESCRIBE_HEARING_LOSS: BILATERAL HEARING LOSS
TOILETING_ISSUES: NO
ADLS_ACUITY_SCORE: 14
ADLS_ACUITY_SCORE: 14
EQUIPMENT_CURRENTLY_USED_AT_HOME: WALKER, STANDARD
DRESSING/BATHING_DIFFICULTY: NO
ADLS_ACUITY_SCORE: 10
ADLS_ACUITY_SCORE: 10
WERE_AUXILIARY_AIDS_OFFERED?: YES
ADLS_ACUITY_SCORE: 6
DOING_ERRANDS_INDEPENDENTLY_DIFFICULTY: NO
THE_FOLLOWING_AIDS_WERE_PROVIDED;: PATIENT DECLINED OFFER OF AUXILIARY AIDS
DRESSING/BATHING_DIFFICULTY: NO
DIFFICULTY_COMMUNICATING: YES
PATIENT_/_FAMILY_COMMUNICATION_STYLE: SPOKEN LANGUAGE (ENGLISH OR BILINGUAL)
ADLS_ACUITY_SCORE: 10
WALKING_OR_CLIMBING_STAIRS_DIFFICULTY: YES
WALKING_OR_CLIMBING_STAIRS: AMBULATION DIFFICULTY, ASSISTANCE 1 PERSON;AMBULATION DIFFICULTY, REQUIRES EQUIPMENT
DIFFICULTY_EATING/SWALLOWING: NO
FALL_HISTORY_WITHIN_LAST_SIX_MONTHS: NO
ADLS_ACUITY_SCORE: 10
WEAR_GLASSES_OR_BLIND: NO
DOING_ERRANDS_INDEPENDENTLY_DIFFICULTY: NO
FALL_HISTORY_WITHIN_LAST_SIX_MONTHS: NO

## 2021-12-04 ASSESSMENT — ENCOUNTER SYMPTOMS
AGITATION: 0
VOMITING: 1
ARTHRALGIAS: 0
FEVER: 0
SHORTNESS OF BREATH: 1
CONSTIPATION: 0
LIGHT-HEADEDNESS: 0
DYSURIA: 0
DIARRHEA: 0
CHEST TIGHTNESS: 0
CHILLS: 0
NAUSEA: 1

## 2021-12-04 NOTE — PHARMACY-CONSULT NOTE
Pharmacy- Renal Dose Adjustment    Patient Active Problem List   Diagnosis     Abdominal pain, chronic, right upper quadrant     Cardiomyopathy in diseases classified elsewhere (H)     Controlled substance agreement signed     COPD (chronic obstructive pulmonary disease) (H)     CAD (coronary artery disease)     Depression     Diverticulosis of sigmoid colon     SUSAN (generalized anxiety disorder)     H/O adenomatous polyp of colon     Hypertension     Migraine headache     Pure hypercholesterolemia     Recurrent ventral hernia     Squamous cell carcinoma of skin of left upper extremity, including shoulder     Tobacco use disorder     Vitamin D deficiency     Community acquired pneumonia of right lung     E. coli UTI (urinary tract infection)     Closed fracture of sacrum (H)     Fall at home     Anemia     Hypomagnesemia     Hyponatremia     Acute respiratory failure with hypoxia (H)     Pneumonia due to 2019 novel coronavirus     Renal insufficiency     Pneumonia of left lung due to infectious organism, unspecified part of lung     Acute kidney injury (H)        Relevant Labs:  Recent Labs   Lab Test 12/04/21  0954 11/01/21  0620   WBC 19.4* 7.0   HGB 10.8* 11.1*    545*        CrCl: 15.2 mL/min    No intake or output data in the 24 hours ending 12/04/21 1341       Per Renal Dose Adjustment Protocol, will adjust:  Cefepime 2g Q8H to cefepime 2g Q24h for HAP      Will continue to follow and make adjustments accordingly. Thank You.    Marisela Brian [facility] ....................  12/4/2021   1:41 PM

## 2021-12-04 NOTE — ED PROVIDER NOTES
History     Chief Complaint   Patient presents with     Shortness of Breath     HPI  Jaida Nieto is a 76 year old female who comes in by ambulance from home.  She lives in an apartment in an assisted living but does not have much assistance.  Recently hospitalized here for shortness of breath and Covid pneumonia.  Was discharged 1 month ago.  I believe she wants to nursing home for convalescence.  Has been home now for couple of weeks.  She said she has just not felt well, she still feels very weak and has a hard time doing some of her activities of daily living.  Breathing seems to be getting worse so she called the ambulance this morning.    Allergies:  Allergies   Allergen Reactions     Gabapentin Other (See Comments) and Unknown     Felt like being plugged into light socket after taking 1 pill  Lethargy and sweaty     Latex Rash     Morphine Nausea and Vomiting and GI Disturbance     Codeine Other (See Comments) and Nausea and Vomiting     dizzy     Diphenhydramine      Other reaction(s): Irritability  Hyper, feels awful on this     Nortriptyline      Other reaction(s): Emotional Disturbance  Made her feel funny     Sucralfate Nausea       Problem List:    Patient Active Problem List    Diagnosis Date Noted     Renal insufficiency 12/04/2021     Priority: Medium     Pneumonia of left lung due to infectious organism, unspecified part of lung 12/04/2021     Priority: Medium     Hyponatremia 10/27/2021     Priority: Medium     Acute respiratory failure with hypoxia (H) 10/27/2021     Priority: Medium     Pneumonia due to 2019 novel coronavirus 10/27/2021     Priority: Medium     Anemia 03/09/2018     Priority: Medium     Hypomagnesemia 03/09/2018     Priority: Medium     Community acquired pneumonia of right lung 03/08/2018     Priority: Medium     E. coli UTI (urinary tract infection) 03/08/2018     Priority: Medium     Closed fracture of sacrum (H) 03/08/2018     Priority: Medium     Fall at home 03/08/2018      Priority: Medium     Abdominal pain, chronic, right upper quadrant 11/30/2015     Priority: Medium     Controlled substance agreement signed 11/30/2015     Priority: Medium     Squamous cell carcinoma of skin of left upper extremity, including shoulder 11/12/2015     Priority: Medium     COPD (chronic obstructive pulmonary disease) (H) 08/05/2015     Priority: Medium     Recurrent ventral hernia 02/14/2014     Priority: Medium     SUSAN (generalized anxiety disorder) 01/08/2014     Priority: Medium     Vitamin D deficiency 10/14/2013     Priority: Medium     Hypertension 08/30/2013     Priority: Medium     H/O adenomatous polyp of colon 08/26/2013     Priority: Medium     Depression 06/10/2013     Priority: Medium     Diverticulosis of sigmoid colon 06/07/2013     Priority: Medium     CAD (coronary artery disease) 01/08/2007     Priority: Medium     mild CAD 9-06; Stress CM, initial EF 15%.       Cardiomyopathy in diseases classified elsewhere (H) 09/15/2006     Priority: Medium     Stress CM/[place]  initial EF15%. 9-06  F/U EF 55%.       Tobacco use disorder 09/15/2006     Priority: Medium     Overview:   1PPD       Migraine headache 09/13/2006     Priority: Medium     Pure hypercholesterolemia 09/13/2006     Priority: Medium        Past Medical History:    Past Medical History:   Diagnosis Date     Abdominal pain, chronic, right upper quadrant 11/30/2015     Acute biliary pancreatitis 10/5/2015     CAD (coronary artery disease) 01/2007     Cardiomyopathy in disease classified elsewhere (H) 09/2006     Closed fracture of sacrum (H) 3/8/2018     Community acquired pneumonia of right lung 3/8/2018     Diverticulosis of sigmoid colon 6/7/2013     Essential (primary) hypertension 08/2013     H/O adenomatous polyp of colon 8/26/2013     Hyperlipidemia      Major depressive disorder, single episode      Migraine without status migrainosus, not intractable      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  08/15/2011     Other specified congenital malformations of kidney (CODE) 06/28/2011     S/P repair of recurrent ventral hernia 3/11/2014     Squamous cell carcinoma of skin of left upper limb, including shoulder 11/2015     Type 2 diabetes mellitus without complications (H)      Vitamin D deficiency 10/14/2013       Past Surgical History:    Past Surgical History:   Procedure Laterality Date     ARTHROPLASTY HIP      left     ARTHROSCOPY KNEE      left knee surgical repair     CHOLECYSTECTOMY  09/03/2013    open     COLONOSCOPY  08/26/2013 8/26/13,F/U 2018     COLOSTOMY  06/04/2013    Sigmoid Colostomy/Brian's, removal mesh     COLOSTOMY  08/27/2013    Colostomy closure, appy 29 mm EEA     ELBOW SURGERY      left elbow repair surgical     HERNIA REPAIR      x 2 with mesh     LAPAROTOMY EXPLORATORY  09/03/2013    9/3/13,lysis of adhesions     OTHER SURGICAL HISTORY  03/10/2014    with mesh and bilateral component separation       Family History:    Family History   Problem Relation Age of Onset     Heart Disease Mother         Heart Disease,Valvular Heart Surgery     Colon Cancer Father         Cancer-colon     Colon Cancer Sister         Cancer-colon       Social History:  Marital Status:   [5]  Social History     Tobacco Use     Smoking status: Current Every Day Smoker     Packs/day: 1.00     Years: 42.00     Pack years: 42.00     Types: Cigarettes     Smokeless tobacco: Never Used   Vaping Use     Vaping Use: Never used   Substance Use Topics     Alcohol use: No     Alcohol/week: 0.0 standard drinks     Drug use: Never        Medications:    albuterol (2.5 MG/3ML) 0.083% neb solution  albuterol (VENTOLIN HFA) 108 (90 BASE) MCG/ACT Inhaler  ALPRAZolam (XANAX) 0.5 MG tablet  aspirin EC 81 MG EC tablet  atorvastatin (LIPITOR) 20 MG tablet  clonazePAM (KLONOPIN) 1 MG tablet  fluticasone-vilanterol (BREO ELLIPTA) 200-25 MCG/INH inhaler  lidocaine (LIDODERM) 5 % patch  lisinopril (ZESTRIL) 2.5 MG  tablet  LORazepam (ATIVAN) 0.5 MG tablet  metoprolol succinate (TOPROL-XL) 25 MG 24 hr tablet  mineral oil-hydrophilic petrolatum (AQUAPHOR) external ointment  nicotine (NICODERM CQ) 14 MG/24HR 24 hr patch  Nutritional Supplements (BOOST HIGH PROTEIN) LIQD  omeprazole (PRILOSEC) 40 MG DR capsule  order for DME  order for DME  predniSONE (DELTASONE) 10 MG tablet  Respiratory Therapy Supplies (NEBULIZER COMPRESSOR) KIT  Respiratory Therapy Supplies (NEBULIZER COMPRESSOR) KIT  rivaroxaban ANTICOAGULANT (XARELTO ANTICOAGULANT) 10 MG TABS tablet  tiotropium (SPIRIVA) 18 MCG inhaled capsule  traMADol (ULTRAM) 50 MG tablet  triamcinolone (KENALOG) 0.1 % external cream  venlafaxine (EFFEXOR-XR) 150 MG 24 hr capsule  venlafaxine (EFFEXOR-XR) 75 MG 24 hr capsule          Review of Systems   Constitutional: Negative for chills and fever.   HENT: Negative for congestion.    Eyes: Negative for visual disturbance.   Respiratory: Positive for shortness of breath. Negative for chest tightness.    Cardiovascular: Negative for chest pain.   Gastrointestinal: Positive for nausea and vomiting. Negative for constipation and diarrhea.   Genitourinary: Negative for dysuria.   Musculoskeletal: Negative for arthralgias.   Skin: Negative for rash.   Neurological: Negative for light-headedness.   Psychiatric/Behavioral: Negative for agitation.       Physical Exam   BP: (!) 87/53  Pulse: 91  Temp: (!) 95.8  F (35.4  C)  Resp: 24  Weight: 52 kg (114 lb 11.2 oz)  SpO2: 91 %      Physical Exam  Vitals and nursing note reviewed.   Constitutional:       Appearance: She is well-developed. She is ill-appearing.   HENT:      Head: Normocephalic and atraumatic.      Mouth/Throat:      Mouth: Mucous membranes are moist.   Eyes:      Conjunctiva/sclera: Conjunctivae normal.   Cardiovascular:      Rate and Rhythm: Normal rate and regular rhythm.      Heart sounds: Normal heart sounds.   Pulmonary:      Effort: Pulmonary effort is normal.      Breath  sounds: Normal breath sounds.   Abdominal:      General: Abdomen is flat.   Skin:     General: Skin is warm and dry.   Neurological:      Mental Status: She is alert and oriented to person, place, and time.   Psychiatric:         Mood and Affect: Mood normal.         Behavior: Behavior normal.         ED Course          EKG shows sinus rhythm 88 bpm.  She does have T wave inversion V1 through V4.  This is seen in previous EKG however perhaps more pronounced today.       Procedures                Results for orders placed or performed during the hospital encounter of 12/04/21 (from the past 24 hour(s))   Lactic acid whole blood STAT   Result Value Ref Range    Lactic Acid 1.6 0.7 - 2.0 mmol/L   CBC with platelets differential    Narrative    The following orders were created for panel order CBC with platelets differential.  Procedure                               Abnormality         Status                     ---------                               -----------         ------                     CBC with platelets and d...[[id number]]  Abnormal            Final result                 Please view results for these tests on the individual orders.   Comprehensive metabolic panel   Result Value Ref Range    Sodium 132 (L) 134 - 144 mmol/L    Potassium 4.1 3.5 - 5.1 mmol/L    Chloride 95 (L) 98 - 107 mmol/L    Carbon Dioxide (CO2) 26 21 - 31 mmol/L    Anion Gap 11 3 - 14 mmol/L    Urea Nitrogen 39 (H) 7 - 25 mg/dL    Creatinine 2.67 (H) 0.60 - 1.20 mg/dL    Calcium 9.6 8.6 - 10.3 mg/dL    Glucose 113 (H) 70 - 105 mg/dL    Alkaline Phosphatase 88 34 - 104 U/L    AST 13 13 - 39 U/L    ALT 10 7 - 52 U/L    Protein Total 6.8 6.4 - 8.9 g/dL    Albumin 3.1 (L) 3.5 - 5.7 g/dL    Bilirubin Total 0.4 0.3 - 1.0 mg/dL    GFR Estimate 17 (L) >60 mL/min/1.73m2   CBC with platelets and differential   Result Value Ref Range    WBC Count 19.4 (H) 4.0 - 11.0 10e3/uL    RBC Count 3.64 (L) 3.80 - 5.20 10e6/uL    Hemoglobin 10.8 (L) 11.7 -  15.7 g/dL    Hematocrit 33.5 (L) 35.0 - 47.0 %    MCV 92 78 - 100 fL    MCH 29.7 26.5 - 33.0 pg    MCHC 32.2 31.5 - 36.5 g/dL    RDW 15.6 (H) 10.0 - 15.0 %    Platelet Count 403 150 - 450 10e3/uL    % Neutrophils 85 %    % Lymphocytes 7 %    % Monocytes 6 %    % Eosinophils 0 %    % Basophils 0 %    % Immature Granulocytes 2 %    NRBCs per 100 WBC 0 <1 /100    Absolute Neutrophils 16.6 (H) 1.6 - 8.3 10e3/uL    Absolute Lymphocytes 1.3 0.8 - 5.3 10e3/uL    Absolute Monocytes 1.1 0.0 - 1.3 10e3/uL    Absolute Eosinophils 0.0 0.0 - 0.7 10e3/uL    Absolute Basophils 0.1 0.0 - 0.2 10e3/uL    Absolute Immature Granulocytes 0.3 <=0.4 10e3/uL    Absolute NRBCs 0.0 10e3/uL   XR Chest Port 1 View    Narrative    PROCEDURE INFORMATION:   Exam: XR Chest   Exam date and time: 12/4/2021 10:03 AM   Age: 76 years old   Clinical indication: Shortness of breath; Additional info: SOB     TECHNIQUE:   Imaging protocol: XR of the chest.   Views: 1 view.     COMPARISON:   CR XR CHEST PORT 1 VIEW 10/26/2021 9:28 PM     FINDINGS:   Lungs: Infiltrate seen previously at the lung bases are still present but   improved but there is a new area of significant confluent infiltrate lateral to   the left hilum. In the proper clinical setting the findings are consistent with   pneumonia.   Pleural spaces: Unremarkable. No pleural effusion. No pneumothorax.   Heart/Mediastinum: Unremarkable. No cardiomegaly.   Bones/joints: Unremarkable.       Impression    IMPRESSION:   Infiltrate seen previously at the lung bases are still present but improved but   there is a new area of significant confluent infiltrate lateral to the left   hilum. In the proper clinical setting the findings are consistent with   pneumonia.     THIS DOCUMENT HAS BEEN ELECTRONICALLY SIGNED BY JULES BAIRES MD       Medications   piperacillin-tazobactam (ZOSYN) intermittent infusion 4.5 g (has no administration in time range)   azithromycin 500 mg (ZITHROMAX) in 0.9% NaCl 250 mL  intermittent infusion 500 mg (has no administration in time range)   sodium chloride 0.9% infusion (has no administration in time range)       Assessments & Plan (with Medical Decision Making)     I have reviewed the nursing notes.    I have reviewed the findings, diagnosis, plan and need for follow up with the patient.  Patient appears to have left sided infiltrate consistent with pneumonia.  Also some acute renal insufficiency likely from dehydration.  We will start an IV, give her some fluids IV antibiotics.  I have spoken with Dr. Lomax, hospitalist, who has accepted the patient to the medical floor.    New Prescriptions    No medications on file       Final diagnoses:   Pneumonia of left lung due to infectious organism, unspecified part of lung   Renal insufficiency       12/4/2021   [facility]     Jewel Cadet MD  12/04/21 1047       Jewel Cadet MD  12/04/21 1234

## 2021-12-04 NOTE — H&P
[facility]    History and Physical  Hospitalist       Date of Admission:  12/4/2021    Assessment & Plan   Jaida Nieto is a 76 year old female who presents with a left-sided pneumonia.  We will consider this a healthcare associated pneumonia because of her recent hospitalization and nursing home stay.    Principal Problem:    Pneumonia of left lung due to infectious organism, unspecified part of lung    Assessment: May be due to pneumococcus.  Will check urine antigen.  Await sputum culture and blood culture. Leukocytosis and infiltrate on chest xray.  RSV, influenza and Covid were negative.  She is Covid recovered.  She is not vaccinated.    Plan: IV cefepime pending culture.  Will not continue azithromycin because of prolonged QT.  Nebs/pulmonary toilet/inhalers.  Oxygen supplementation if necessary.  RCAT    Active Problems:      Acute kidney injury (H)    Assessment: Likely due to dehydration.  Has been nauseated but will hold Zofran because of prolonged QT and will also check magnesium    Plan: IV fluids and monitor labs.      COPD (chronic obstructive pulmonary disease) (H)    Assessment: Likely has an exacerbation although really does not have much for wheezing or coarse breath sounds    Plan: Continue with Breo, Spiriva, as needed albuterol.  We will hold off on steroid treatment at this time, but have a low threshold for starting that.      SUSAN (generalized anxiety disorder)    Assessment: Longstanding and managed with as needed alprazolam    Plan: Continue as needed alprazolam        DVT Prophylaxis: Pneumatic Compression Devices  Code Status: Full Code    Eron Lomax    Primary Care Physician   Osiel Calderon    Chief Complaint   Shortness of breath    History is obtained from the patient, ER MD, and chart review.    History of Present Illness   Jaida Nieto is a 76 year old female with COPD who presented to the emergency room by ambulance from home.  She lives in an independent  apartment in an assisted living but really does not have much for help.  She was recently hospitalized here for 2 weeks with Covid pneumonia.  She was discharged to [facility] where she did some rehabilitation and then went home a couple weeks ago.  She saw her primary physician Dr. Calderon at [facility] for follow-up and was doing reasonably well.  2 days prior to admission now, she began feeling more short of breath and had some nausea and vomiting.  She really did not eat much and really has only been drinking boost over the last couple of weeks.  She has been trying to drink some fluids but has not been very successful.  Her breathing seemed to be getting worse today she was feeling very weak and having a hard time doing her activities of daily living so she called the ambulance.  She denies fever but had had some hot flashes and felt chilled.  Has had some colored sputum. Since she had not been eating much, she had not had much for a bowel movement.      She was seen and evaluated and found to not be hypoxic but was slightly dyspneic.  She had CT of the chest abdomen pelvis which showed a left-sided infiltrate in her chest consistent with pneumonia.  She had a leukocytosis and elevated CRP.  She was admitted to the hospital for further cares.  She initially received a dose of a azithromycin along with cefepime for a presumed hospital-acquired pneumonia due to her recent stay at the nursing home.  He was also found to be in acute renal failure with a creatinine of 2.67.  Her baseline is normal.    Past Medical History    I have reviewed this patient's medical history and updated it with pertinent information if needed.   Past Medical History:   Diagnosis Date     Abdominal pain, chronic, right upper quadrant 11/30/2015     Acute biliary pancreatitis 10/5/2015     CAD (coronary artery disease) 01/2007    mild CAD 9-06; Stress CM, initial EF 15%.     Cardiomyopathy in disease classified elsewhere (H)  09/2006    Stress CM/T[place]o initial EF15%. 9-06 F/U EF 55%.     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  Past Surgical History:   Procedure Laterality Date     ARTHROPLASTY HIP      left     ARTHROSCOPY KNEE      left knee surgical repair     CHOLECYSTECTOMY  09/03/2013    open     COLONOSCOPY  08/26/2013 8/26/13,F/U 2018     COLOSTOMY  06/04/2013    Sigmoid Colostomy/Brian's, removal mesh     COLOSTOMY  08/27/2013    Colostomy closure, appy 29 mm EEA     ELBOW SURGERY      left elbow repair surgical     HERNIA REPAIR      x 2 with mesh     LAPAROTOMY EXPLORATORY  09/03/2013    9/3/13,lysis of adhesions     OTHER SURGICAL HISTORY  03/10/2014    with mesh and bilateral component separation       Prior to Admission Medications   Prior to Admission Medications   Prescriptions Last Dose Informant Patient Reported? Taking?   ALPRAZolam (XANAX) 0.5 MG tablet   No No   Sig: Take 1 tablet (0.5 mg) by mouth 3 times daily as needed for anxiety   LORazepam (ATIVAN) 0.5 MG tablet   No No   Sig: Take 1 tablet (0.5 mg) by mouth every 6 hours as needed for anxiety    Nutritional Supplements (BOOST HIGH PROTEIN) LIQD  Other Yes No   Sig: Drink one pouch 3 times daily.   Respiratory Therapy Supplies (NEBULIZER COMPRESSOR) KIT  Pharmacy Yes No   Sig: Nebulizer machine and mask/tubing   Respiratory Therapy Supplies (NEBULIZER COMPRESSOR) KIT  Pharmacy Yes No   Sig: Nebulizer, neb kit, neb cup and mask.  Medication: Albuterol  For home use. Length of need  for Medicare patients: 99   albuterol (2.5 MG/3ML) 0.083% neb solution  Pharmacy Yes No   Sig: Inhale 1 vial into the lungs every 4 hours as needed    albuterol (VENTOLIN HFA) 108 (90 BASE) MCG/ACT Inhaler  Pharmacy Yes No   Sig: Inhale 1-2 puffs into the lungs every 4 hours as needed    aspirin EC 81 MG EC tablet  Other Yes No   Sig: Take 81 mg by mouth daily    atorvastatin (LIPITOR) 20 MG tablet  Pharmacy Yes No   Sig: Take 20 mg by mouth At Bedtime    clonazePAM (KLONOPIN) 1 MG tablet  Pharmacy Yes No   Sig: Take 1 mg by mouth 2 times daily as needed    fluticasone-vilanterol (BREO ELLIPTA) 200-25 MCG/INH inhaler   No No   Sig: Inhale 1 puff into the lungs daily   lidocaine (LIDODERM) 5 % patch   No No   Sig: Place 1 patch onto the skin every 24 hours To prevent lidocaine toxicity, patient should be patch free for 12 hrs daily.   lisinopril (ZESTRIL) 2.5 MG tablet   No No   Sig: Take 1 tablet (2.5 mg) by mouth daily   metoprolol succinate (TOPROL-XL) 25 MG 24 hr tablet  Pharmacy Yes No   Sig: Take 25 mg by mouth daily   mineral oil-hydrophilic petrolatum (AQUAPHOR) external ointment  Pharmacy Yes No   Sig: Apply topically as needed (dry skin or rash)   nicotine (NICODERM CQ) 14 MG/24HR 24 hr patch   No No   Sig: Place 1 patch onto the skin daily   omeprazole (PRILOSEC) 40 MG DR capsule  Pharmacy Yes No   Sig: Take 40 mg by mouth daily   order for DME  Other Yes No   Sig: Hospital bed for recent abdominal surgery   order for DME  Other No No   Sig: Equipment being ordered: Walker 4 wheels  Treatment Diagnosis: sacral  fracture  PETE: 6 months   predniSONE (DELTASONE) 10 MG tablet   No No   Si tabs daily for 2 days, then 3 tabs daily for 2 days, then 2 tabs daily for 2 days, then 1 tab daily for 2 days, then stop   rivaroxaban ANTICOAGULANT (XARELTO ANTICOAGULANT) 10 MG TABS tablet   No No   Sig: Take 1 tablet (10 mg) by mouth daily (with dinner)   tiotropium (SPIRIVA) 18 MCG inhaled capsule  Pharmacy Yes No   Sig: Inhale 1 capsule into the lungs daily    traMADol (ULTRAM) 50 MG tablet   No No   Sig: Take 1 tablet (50 mg) by mouth every 6 hours as needed for moderate pain   triamcinolone (KENALOG) 0.1 % external cream  Pharmacy Yes No   Sig: Apply topically 2 times daily   venlafaxine (EFFEXOR-XR) 150 MG 24 hr capsule  Pharmacy Yes No   Sig: Take 150 mg by mouth daily Take with 75 mg capsule to equal 225 mg/day.   venlafaxine (EFFEXOR-XR) 75 MG 24 hr capsule  Pharmacy Yes No   Sig: Take 75 mg by mouth daily Take with 150 mg capsule to equal 225 mg/day.      Facility-Administered Medications: None     Allergies   Allergies   Allergen Reactions     Gabapentin Other (See Comments) and Unknown     Felt like being plugged into light socket after taking 1 pill  Lethargy and sweaty     Latex Rash     Morphine Nausea and Vomiting and GI Disturbance     Codeine Other (See Comments) and Nausea and Vomiting     dizzy     Diphenhydramine      Other reaction(s): Irritability  Hyper, feels awful on this     Nortriptyline      Other reaction(s): Emotional Disturbance  Made her feel funny     Sucralfate Nausea       Social History   I have reviewed this patient's social history and updated it with pertinent information if needed. Jaida BRAY Gerson  reports that she has been smoking cigarettes. She has a 42.00 pack-year smoking history. She has never used smokeless tobacco. She reports that she does not drink alcohol and does not use drugs.    Family History   I have reviewed this patient's family history and updated it with pertinent information  "if needed.   Family History   Problem Relation Age of Onset     Heart Disease Mother         Heart Disease,Valvular Heart Surgery     Colon Cancer Father         Cancer-colon     Colon Cancer Sister         Cancer-colon       Review of Systems     REVIEW OF SYSTEMS:    Constitutional: normal energy and appetite, no recent sick contacts  Eyes: no changes in vision  Ears, nose, mouth, throat, and face: no mouth sores, dysphagia, or odynophagia  Respiratory: See HPI  Cardiovascular: no chest pain, palpitations, orthopnea, increased lower extremity edema, or syncope.   Gastrointestinal: no constipation, diarrhea, nausea, vomiting or abdominal pain.  Genitourinary: no dysuria, hematuria, urgency or frequency.   Hematologic/lymphatic: no unintentional weight loss or night sweats.  Musculoskeletal: no pain to extremities or falls.   Neurological: no new weakness, tingling, numbness.   Psychiatric: no hallucinations or delusions.  Endocrine: She is not not a known diabetic.  Has been told that she is \"borderline\"        Physical Exam   Temp: 98.2  F (36.8  C) Temp src: Oral BP: (!) 86/48 Pulse: 86   Resp: 22 SpO2: 92 % O2 Device: None (Room air)    Vital Signs with Ranges  Temp:  [95.8  F (35.4  C)-98.2  F (36.8  C)] 98.2  F (36.8  C)  Pulse:  [85-91] 86  Resp:  [10-24] 22  BP: (80-87)/(48-61) 86/48  SpO2:  [91 %-94 %] 92 %  114 lbs 11.2 oz    Constitutional: Thin frail elderly lady lying on hospital bed.  She is edentulous but has dentures.    Eyes: PERRLA, EOMI.  Conjunctiva sclerae normal.  HEENT: Nose is clear, mucous membranes are dry.  Throat is clear but dry  Respiratory: Pink puffer habitus.  Diminished breath sounds throughout.  No definite rales rhonchi or wheezes heard.  Some left-sided chest discomfort noted  Cardiovascular: RRR without murmur  GI: Abdomen is protuberant and soft but nontender  Lymph/Hematologic: No palpable lymphadenopathy  Skin: No rash or open areas  Musculoskeletal: No swelling or synovitis " of any joints  Neurologic: No focal neurologic findings  Psychiatric: Affect is some what restricted but she will smile at times.  No formal thought disorder.    Data   Data reviewed today:  I personally reviewed the EKG tracing showing Normal sinus rhythm.  Increased QTC compared to previous tracing.  Lateral T wave inversion is noted that was not present on previous tracing and the chest x-ray image(s) showing Left-sided pulmonary infiltrate consistent with pneumonia.  Recent Labs   Lab 12/04/21  0954   WBC 19.4*   HGB 10.8*   MCV 92      *   POTASSIUM 4.1   CHLORIDE 95*   CO2 26   BUN 39*   CR 2.67*   ANIONGAP 11   DARRIUS 9.6   *   ALBUMIN 3.1*   PROTTOTAL 6.8   BILITOTAL 0.4   ALKPHOS 88   ALT 10   AST 13       Recent Results (from the past 24 hour(s))   XR Chest Port 1 View    Narrative    PROCEDURE INFORMATION:   Exam: XR Chest   Exam date and time: 12/4/2021 10:03 AM   Age: 76 years old   Clinical indication: Shortness of breath; Additional info: SOB     TECHNIQUE:   Imaging protocol: XR of the chest.   Views: 1 view.     COMPARISON:   CR XR CHEST PORT 1 VIEW 10/26/2021 9:28 PM     FINDINGS:   Lungs: Infiltrate seen previously at the lung bases are still present but   improved but there is a new area of significant confluent infiltrate lateral to   the left hilum. In the proper clinical setting the findings are consistent with   pneumonia.   Pleural spaces: Unremarkable. No pleural effusion. No pneumothorax.   Heart/Mediastinum: Unremarkable. No cardiomegaly.   Bones/joints: Unremarkable.       Impression    IMPRESSION:   Infiltrate seen previously at the lung bases are still present but improved but   there is a new area of significant confluent infiltrate lateral to the left   hilum. In the proper clinical setting the findings are consistent with   pneumonia.     THIS DOCUMENT HAS BEEN ELECTRONICALLY SIGNED BY JULES BAIRES MD

## 2021-12-04 NOTE — ED TRIAGE NOTES
ED Nursing Triage Note (General)   ________________________________    Jaida Nieto is a 76 year old Female that presents to triage ambulance with history of shortness of breath, emesis X1 and headache reported by patient   Reports feeling SOB on and off due to COPD.  BP (!) 87/53   Pulse 91   Temp (!) 95.8  F (35.4  C) (Tympanic)   Resp 24   Wt 52 kg (114 lb 11.2 oz)   SpO2 91%   BMI 17.96 kg/m  t  Patient appears alert , in mild distress., and cooperative behavior.    GCS Eye Opening = 4=Spontaneous  Airway: intact  Breathing noted as Normal  Circulation Normal  Skin:  Normal  Action taken: [place] 4    PRE HOSPITAL PRIOR LIVING SITUATION Alone

## 2021-12-04 NOTE — PROGRESS NOTES
Patient arrived from ED able to make needs known, Bp's have been on the softer side, no issues with skin, able to ambulate to bathroom. No further issues at this time. Patient remains on room air. Will continue to monitor. Donya Gomez RN on 12/4/2021 at 4:18 PM

## 2021-12-05 ENCOUNTER — APPOINTMENT (OUTPATIENT)
Dept: PHYSICAL THERAPY | Facility: OTHER | Age: 76
DRG: 194 | End: 2021-12-05
Attending: FAMILY MEDICINE
Payer: COMMERCIAL

## 2021-12-05 ENCOUNTER — APPOINTMENT (OUTPATIENT)
Dept: OCCUPATIONAL THERAPY | Facility: OTHER | Age: 76
DRG: 194 | End: 2021-12-05
Attending: FAMILY MEDICINE
Payer: COMMERCIAL

## 2021-12-05 LAB
ANION GAP SERPL CALCULATED.3IONS-SCNC: 13 MMOL/L (ref 3–14)
BASOPHILS # BLD AUTO: 0.1 10E3/UL (ref 0–0.2)
BASOPHILS NFR BLD AUTO: 1 %
BUN SERPL-MCNC: 42 MG/DL (ref 7–25)
CALCIUM SERPL-MCNC: 9 MG/DL (ref 8.6–10.3)
CHLORIDE BLD-SCNC: 102 MMOL/L (ref 98–107)
CO2 SERPL-SCNC: 20 MMOL/L (ref 21–31)
CREAT SERPL-MCNC: 2.13 MG/DL (ref 0.6–1.2)
CRP SERPL-MCNC: 211 MG/L
EOSINOPHIL # BLD AUTO: 0.1 10E3/UL (ref 0–0.7)
EOSINOPHIL NFR BLD AUTO: 0 %
ERYTHROCYTE [DISTWIDTH] IN BLOOD BY AUTOMATED COUNT: 15.9 % (ref 10–15)
GFR SERPL CREATININE-BSD FRML MDRD: 22 ML/MIN/1.73M2
GLUCOSE BLD-MCNC: 76 MG/DL (ref 70–105)
HCT VFR BLD AUTO: 32.7 % (ref 35–47)
HGB BLD-MCNC: 10.4 G/DL (ref 11.7–15.7)
IMM GRANULOCYTES # BLD: 0.3 10E3/UL
IMM GRANULOCYTES NFR BLD: 2 %
LACTATE SERPL-SCNC: 0.7 MMOL/L (ref 0.7–2)
LYMPHOCYTES # BLD AUTO: 1.4 10E3/UL (ref 0.8–5.3)
LYMPHOCYTES NFR BLD AUTO: 8 %
MAGNESIUM SERPL-MCNC: 2 MG/DL (ref 1.9–2.7)
MCH RBC QN AUTO: 29.7 PG (ref 26.5–33)
MCHC RBC AUTO-ENTMCNC: 31.8 G/DL (ref 31.5–36.5)
MCV RBC AUTO: 93 FL (ref 78–100)
MONOCYTES # BLD AUTO: 0.9 10E3/UL (ref 0–1.3)
MONOCYTES NFR BLD AUTO: 5 %
NEUTROPHILS # BLD AUTO: 15.1 10E3/UL (ref 1.6–8.3)
NEUTROPHILS NFR BLD AUTO: 84 %
NRBC # BLD AUTO: 0 10E3/UL
NRBC BLD AUTO-RTO: 0 /100
PLATELET # BLD AUTO: 477 10E3/UL (ref 150–450)
POTASSIUM BLD-SCNC: 4 MMOL/L (ref 3.5–5.1)
RBC # BLD AUTO: 3.5 10E6/UL (ref 3.8–5.2)
SODIUM SERPL-SCNC: 135 MMOL/L (ref 134–144)
WBC # BLD AUTO: 17.8 10E3/UL (ref 4–11)

## 2021-12-05 PROCEDURE — 250N000013 HC RX MED GY IP 250 OP 250 PS 637: Performed by: FAMILY MEDICINE

## 2021-12-05 PROCEDURE — 86140 C-REACTIVE PROTEIN: CPT | Performed by: FAMILY MEDICINE

## 2021-12-05 PROCEDURE — 94664 DEMO&/EVAL PT USE INHALER: CPT

## 2021-12-05 PROCEDURE — 36415 COLL VENOUS BLD VENIPUNCTURE: CPT | Performed by: FAMILY MEDICINE

## 2021-12-05 PROCEDURE — 97530 THERAPEUTIC ACTIVITIES: CPT | Mod: GO | Performed by: OCCUPATIONAL THERAPIST

## 2021-12-05 PROCEDURE — 85025 COMPLETE CBC W/AUTO DIFF WBC: CPT | Performed by: FAMILY MEDICINE

## 2021-12-05 PROCEDURE — 99232 SBSQ HOSP IP/OBS MODERATE 35: CPT | Performed by: FAMILY MEDICINE

## 2021-12-05 PROCEDURE — 80048 BASIC METABOLIC PNL TOTAL CA: CPT | Performed by: FAMILY MEDICINE

## 2021-12-05 PROCEDURE — 250N000011 HC RX IP 250 OP 636: Performed by: FAMILY MEDICINE

## 2021-12-05 PROCEDURE — 87899 AGENT NOS ASSAY W/OPTIC: CPT | Performed by: FAMILY MEDICINE

## 2021-12-05 PROCEDURE — 97165 OT EVAL LOW COMPLEX 30 MIN: CPT | Mod: GO | Performed by: OCCUPATIONAL THERAPIST

## 2021-12-05 PROCEDURE — 258N000003 HC RX IP 258 OP 636: Performed by: FAMILY MEDICINE

## 2021-12-05 PROCEDURE — 97162 PT EVAL MOD COMPLEX 30 MIN: CPT | Mod: GP | Performed by: PHYSICAL THERAPIST

## 2021-12-05 PROCEDURE — 83605 ASSAY OF LACTIC ACID: CPT | Performed by: FAMILY MEDICINE

## 2021-12-05 PROCEDURE — 83735 ASSAY OF MAGNESIUM: CPT | Performed by: FAMILY MEDICINE

## 2021-12-05 PROCEDURE — 120N000001 HC R&B MED SURG/OB

## 2021-12-05 PROCEDURE — 94640 AIRWAY INHALATION TREATMENT: CPT

## 2021-12-05 PROCEDURE — 97530 THERAPEUTIC ACTIVITIES: CPT | Mod: GP | Performed by: PHYSICAL THERAPIST

## 2021-12-05 PROCEDURE — 999N000105 HC STATISTIC NO DOCUMENTATION TO SUPPORT CHARGE

## 2021-12-05 RX ADMIN — METOPROLOL SUCCINATE 25 MG: 25 TABLET, EXTENDED RELEASE ORAL at 10:35

## 2021-12-05 RX ADMIN — SODIUM CHLORIDE: 9 INJECTION, SOLUTION INTRAVENOUS at 22:56

## 2021-12-05 RX ADMIN — ACETAMINOPHEN 975 MG: 325 TABLET, FILM COATED ORAL at 06:18

## 2021-12-05 RX ADMIN — PANTOPRAZOLE SODIUM 40 MG: 40 TABLET, DELAYED RELEASE ORAL at 08:36

## 2021-12-05 RX ADMIN — CEFEPIME HYDROCHLORIDE 2 G: 2 INJECTION, POWDER, FOR SOLUTION INTRAVENOUS at 13:34

## 2021-12-05 RX ADMIN — ACETAMINOPHEN 975 MG: 325 TABLET, FILM COATED ORAL at 14:12

## 2021-12-05 RX ADMIN — VENLAFAXINE HYDROCHLORIDE 75 MG: 75 CAPSULE, EXTENDED RELEASE ORAL at 10:35

## 2021-12-05 RX ADMIN — ACETAMINOPHEN 975 MG: 325 TABLET, FILM COATED ORAL at 21:27

## 2021-12-05 RX ADMIN — ATORVASTATIN CALCIUM 20 MG: 20 TABLET, FILM COATED ORAL at 21:27

## 2021-12-05 RX ADMIN — ALPRAZOLAM 0.5 MG: 0.5 TABLET ORAL at 14:16

## 2021-12-05 RX ADMIN — NICOTINE 1 PATCH: 14 PATCH, EXTENDED RELEASE TRANSDERMAL at 10:37

## 2021-12-05 RX ADMIN — VENLAFAXINE HYDROCHLORIDE 150 MG: 75 CAPSULE, EXTENDED RELEASE ORAL at 10:34

## 2021-12-05 RX ADMIN — TIOTROPIUM BROMIDE INHALATION SPRAY 2 PUFF: 3.12 SPRAY, METERED RESPIRATORY (INHALATION) at 08:24

## 2021-12-05 RX ADMIN — ASPIRIN 81 MG: 81 TABLET, COATED ORAL at 10:35

## 2021-12-05 RX ADMIN — SODIUM CHLORIDE: 9 INJECTION, SOLUTION INTRAVENOUS at 06:16

## 2021-12-05 RX ADMIN — SODIUM CHLORIDE: 9 INJECTION, SOLUTION INTRAVENOUS at 14:12

## 2021-12-05 RX ADMIN — FLUTICASONE FUROATE AND VILANTEROL TRIFENATATE 1 PUFF: 200; 25 POWDER RESPIRATORY (INHALATION) at 08:24

## 2021-12-05 RX ADMIN — ALPRAZOLAM 0.5 MG: 0.5 TABLET ORAL at 21:27

## 2021-12-05 ASSESSMENT — ACTIVITIES OF DAILY LIVING (ADL)
ADLS_ACUITY_SCORE: 15
ADLS_ACUITY_SCORE: 17
ADLS_ACUITY_SCORE: 15
ADLS_ACUITY_SCORE: 14
ADLS_ACUITY_SCORE: 15
ADLS_ACUITY_SCORE: 17
ADLS_ACUITY_SCORE: 17
ADLS_ACUITY_SCORE: 14
ADLS_ACUITY_SCORE: 14
ADLS_ACUITY_SCORE: 15
ADLS_ACUITY_SCORE: 15
ADLS_ACUITY_SCORE: 17
ADLS_ACUITY_SCORE: 15

## 2021-12-05 NOTE — PROVIDER NOTIFICATION
12/04/21 1935   Vital Signs   Temp 97.8  F (36.6  C)   Temp src Tympanic   Resp 20   Pulse 88   Pulse Rate Source Monitor   BP (!) 89/51     Unable to get oxygen sat reading on any extremities or ear lobes.  Hands and feet are cool and and blue in color, strong radial pulses.  Patient denies feeling short of breath.  She is alert and talking.  Also has had low BPs.  Updated MD.  Orders to give 1000 ml bolus and ABGS.       Ref. Range 12/4/2021 20:03   pH Arterial Latest Ref Range: 7.35 - 7.45  7.31 (L)   pCO2 Arterial Latest Ref Range: 35 - 45 mm Hg 44   PO2 Arterial Latest Ref Range: 80 - 105 mm Hg 71 (L)   Bicarbonate Arterial Latest Ref Range: 21 - 28 mmol/L 22   Base Excess Art Latest Ref Range: -9.0 - 1.8 mmol/L -4.2   FIO2 Unknown 0   Oxyhemoglobin Arterial Latest Ref Range: 92 - 100 % 93     md updated of results.  Will place patient on 2L d/t ABG results.

## 2021-12-05 NOTE — PROGRESS NOTES
12/05/21 1100   Quick Adds   Type of Visit Initial PT Evaluation   Living Environment   People in home alone   Current Living Arrangements assisted living   Living Environment Comments Initially patient reported she lives in [place] alone, then stated she is in an assisted living facility.    Self-Care   Usual Activity Tolerance fair   Current Activity Tolerance poor   Equipment Currently Used at Home   (Patient denies using assistive device at home )   General Information   Referring Physician Dr. Lomax    Cognition   Orientation Status (Cognition) person;place   Follows Commands (Cognition) follows one-step commands   Pain Assessment   Patient Currently in Pain No   Posture    Posture Kyphosis;Protracted shoulders;Forward head position   Range of Motion (ROM)   ROM Comment ROM limited due to kyphosis, LE's WFL    Strength   Strength Comments LE's 3+/5 knee flexion/extension, 3/5 DF, 3/5 hip flexion    Bed Mobility   Bed Mobility supine-sit;rolling left   Rolling Left [place] (Bed Mobility) modified independence   Supine-Sit [place] (Bed Mobility) modified independence   Impairments Contributing to Impaired Bed Mobility impaired balance;impaired coordination;impaired postural control   Assistive Device (Bed Mobility) bed rails   Transfers   Transfers sit-stand transfer;bed-chair transfer   Transfer Safety Concerns Noted decreased step length;decreased weight-shifting ability   Impairments Contributing to Impaired Transfers impaired balance;impaired postural control   Bed-Chair Transfer   Bed-Chair [place] (Transfers) contact guard   Bed/Chair Transfer Comments no assistive device used, pushed up from bed rail   Sit-Stand Transfer   Sit-Stand [place] (Transfers) contact guard   Gait/Stairs (Locomotion)   [place] Level (Gait) minimum assist (75% patient effort);1 person to manage equipment   Distance in Feet (Required for LE Total Joints) 10 feet   Pattern (Gait) step-to    Deviations/Abnormal Patterns (Gait) base of support, wide;gait speed decreased;stride length decreased;weight shifting decreased   Balance   Balance Comments fair - dynamic stability, reports she doesn't use a walker at home, would benefit from using one at least while in-patient    Clinical Impression   Criteria for Skilled Therapeutic Intervention yes, treatment indicated   PT Diagnosis (PT) impaired mobility    Clinical Presentation Evolving/Changing   Clinical Presentation Rationale clinical judgement, recent hospitalizations    Clinical Decision Making (Complexity) moderate complexity   Therapy Frequency (PT) Daily   Predicted Duration of Therapy Intervention (days/wks) length of stay    Planned Therapy Interventions (PT) balance training;bed mobility training;gait training;transfer training  (TE, TA )   Anticipated Equipment Needs at Discharge (PT) walker, standard   Risk & Benefits of therapy have been explained patient   Clinical Impression Comments Jaida required cues for safety while managing IV and O2 lines, was unsteady on her feet, CGA with A of 1 to manage equipment, she was noted to answer inconsistently and mumbled to herself throughout session.    PT Discharge Planning    PT Discharge Recommendation (DC Rec) home with home care physical therapy   PT Rationale for DC Rec Patient requires ongoing skilled homecare to improve strength, balance and gait    PT Brief overview of current status  Jaida is unsteady without gait aid, fatigued quickly, CGA plus assist of 1 to manage IV and O2.    Total Evaluation Time   Total Evaluation Time (Minutes) 30

## 2021-12-05 NOTE — PLAN OF CARE
/54   Pulse 84   Temp 96.9  F (36.1  C)   Resp 20   Wt 52 kg (114 lb 11.2 oz)   SpO2 94%   BMI 17.96 kg/m      Continue to have difficulty getting continuous pulse ox readings but get brief readings in 90s.  Patient has not displayed any respiratory distress this shift.  Does have some shortness of breath when up to bathroom, denies at rest. Telemetry has stayed in SR in the 80s.  Blood pressures have come up some since bolus.  Hands/feet continue to be cyanotic and cool, per patient this is her baseline.  Patient has denied any pain and remained alert.  No other interventions needed since last nurse's note.  MD is aware of situation with Sp02 readings and have kept pt on 2L via NC.  Pt unable to cough up any sputum for sample.  Every time she has voided it has been mixed with stool and unable to get urine sample. Will continue to attempt

## 2021-12-05 NOTE — PROGRESS NOTES
ON CALL NOTE:  Notified by nursing that pulse oximeter was not reading well on hands and feet were cold.  She did not seem in respiratory distress but blood pressure was in the high 80s.  Blood gas was checked.   Last Arterial Blood Gas:  pH Arterial   Date Value Ref Range Status   12/04/2021 7.31 (L) 7.35 - 7.45 Final     pCO2 Arterial   Date Value Ref Range Status   12/04/2021 44 35 - 45 mm Hg Final     pO2 Arterial   Date Value Ref Range Status   12/04/2021 71 (L) 80 - 105 mm Hg Final     Bicarbonate Arterial   Date Value Ref Range Status   12/04/2021 22 21 - 28 mmol/L Final     Base Excess/Deficit (+/-)   Date Value Ref Range Status   12/04/2021 -4.2 -9.0 - 1.8 mmol/L Final     We will give a bolus of fluid and do some low flow nasal cannula oxygen for now.  Continue to monitor.    Eron Lomax MD on 12/4/2021 at 9:14 PM

## 2021-12-05 NOTE — PROGRESS NOTES
[facility]    Hospitalist Progress Note      Assessment & Plan   Jaida Nieto is a 76 year old female who was admitted on 12/4/2021 with a left-sided pneumonia.  We will consider this a healthcare associated pneumonia because of her recent hospitalization and nursing home stay.     Principal Problem:    Pneumonia of left lung due to infectious organism, unspecified part of lung    Assessment: May be due to pneumococcus.  Will check urine antigen (not collected yet day 2).  Await sputum culture and blood culture. Leukocytosis and infiltrate on chest xray.  RSV, influenza and Covid were negative.  She is Covid recovered.  She is not vaccinated.    -12/5/21 White count is less.  Is needing low flow oxygen. Not on oxygen at home.       Plan: IV cefepime (day 2) pending culture.  Will not continue azithromycin because of prolonged QT.  Nebs/pulmonary toilet/inhalers.  Oxygen supplementation if necessary.  RCAT.     Active Problems:      Acute kidney injury (H)    Assessment: Likely due to dehydration. Improved.  Has been nauseated but will hold Zofran because of prolonged QT. Nausea improved.     Plan: Continue IV fluids and monitor labs.       COPD (chronic obstructive pulmonary disease) (H)    Assessment: Likely has an exacerbation although really does not have much for wheezing or coarse breath sounds    Plan: Continue with Breo, Spiriva, as needed albuterol.  We will hold off on steroid treatment at this time, but have a low threshold for starting that.       SUSAN (generalized anxiety disorder)    Assessment: Longstanding and managed with as needed alprazolam    Plan: Continue as needed alprazolam      Hypomagnesemia    Assessment:  Improved after repletion    Plan: continue to monitor. Mg replacement protocol.       Tobacco abuse    Assessment: resumed smoking 1/2 ppd upon return home from nursing home.    Plan: nicotine patch 14 mg.     Diet: Combination Diet Regular Diet  HISTORY OF PRESENT ILLNESS:  Graham Lamb, is a 68 year old male, who is here for complaints of increased pain from cramping that has been going on for the last few weeks. He admits to drinking wine a little more frequently. His liver enzymes have elevated resulting with an AST of 99. He continues to be followed by GI and states that he has a scheduled appointment in January. He denies any unintended weight loss, right upper quadrant pain, blood per mouth, nausea, vomiting, black tarry stool, or blood per rectum. Blood pressure shows controlled. He denies any chest pain, shortness of breath, palpitations, lightheadedness, dizziness, unilateral symptoms, change of vision, or headache.    Chief Complaint:  Chief Complaint   Patient presents with   • Hypertension   • Alcohol Problem     follow up    • MEDICATION ISSUE     Discuss increase in Norco    • Imm/Inj     Flu vaccine       Medications:  Current Outpatient Prescriptions   Medication Sig   • HYDROcodone-acetaminophen (NORCO)  MG per tablet Take 1 tablet by mouth 2 times daily as needed for Pain.   • amLODIPine (NORVASC) 5 MG tablet Take 1 tablet by mouth daily.   • digoxin (LANOXIN) 0.125 MG tablet Take 1 tablet by mouth daily.   • aspirin 325 MG tablet Take 1 tablet by mouth daily.   • metoPROLOL (LOPRESSOR) 50 MG tablet Take 1 tablet by mouth 2 times daily.   • pantoprazole (PROTONIX) 40 MG tablet Take 1 tablet by mouth daily.   • sildenafil (VIAGRA) 25 MG tablet Take 1 tablet by mouth as needed for Erectile Dysfunction.     No current facility-administered medications for this visit.        Allergies:  ALLERGIES:  No Known Allergies    Past Medical, Surgical and Social histories reviewed and updated in chart.    REVIEW OF SYSTEMS:  Constitutional: Negative for fever and chills.   Skin: Negative for rash.   HEENT: Negative for eye drainage, rhinorrhea, ear pain or sore throat.  Respiratory: Negative cough, wheezing or shortness of breath.    Cardiovascular:  Adult  Snacks/Supplements Adult: Other; what ever we have is fine; Between Meals    DVT Prophylaxis: Pneumatic Compression Devices  Llamas Catheter: Not present  Code Status: Full Code           Disposition Plan   Expected discharge:  2-3 days recommended to prior living arrangement once antibiotic plan established and renal function improved.  Entered: Eron Lomax MD 12/05/2021, 8:41 AM       The patient's care was discussed with the Patient.    Eron Lomax MD  Hospitalist Service  [facility]  Contact information available via [facility] Paging/Directory    ______________________________________________________________________    Interval History     Feels some better. Nausea is better. Interested in having some Ensure.  Abdomen feels better after BM.  Slept some. Less shortness of breath.     -Data reviewed today: I reviewed all new labs and imaging results over the last 24 hours. I personally reviewed no images or EKG's today.    Physical Exam   Temp: 96.9  F (36.1  C) Temp src: Tympanic BP: 105/60 Pulse: 80   Resp: 20 SpO2: 94 % O2 Device: Nasal cannula Oxygen Delivery: 2 LPM  Vitals:    12/04/21 0928 12/05/21 0620   Weight: 52 kg (114 lb 11.2 oz) 52.1 kg (114 lb 14.4 oz)     Vital Signs with Ranges  Temp:  [95.8  F (35.4  C)-98.2  F (36.8  C)] 96.9  F (36.1  C)  Pulse:  [80-91] 80  Resp:  [10-24] 20  BP: ()/(44-61) 105/60  SpO2:  [90 %-95 %] 94 %  I/O last 3 completed shifts:  In: 3216 [P.O.:113; I.V.:3103]  Out: -     Constitutional: Frail, thin lady wearing nasal canula O2, no distress. Tends to mumble but speech is not dysarthric. Dentures are poor fitting  Respiratory: Moving air better.  No rales, rhonchi or wheezes  Cardiovascular: RRR without murmur  GI: less protuberant. Soft  NT abdomen.   Skin/Integumen: no rashes or edema      Medications     sodium chloride 125 mL/hr at 12/05/21 0616       acetaminophen  975 mg Oral Q8H     aspirin  81 mg Oral Daily     atorvastatin   Negative for chest pain, chest pressure, palpitations or diaphoresis.   Gastrointestinal: Negative for nausea, vomiting, diarrhea or abdominal pain.   Genitourinary: Negative for dysuria, urgency, frequency, hematuria or flank pain.  Extremities:  Negative for joint swelling or joint pain.  Neurologic:  Negative for change in sensory or motor function.  Negative for headache.  Endocrine: Negative for heat or cold intolerance, weight loss or gain.  Hematological: Negative for bleeding, brusing or adenopathy.  Psychiatric: Negative for change in mood, affect or sleep disturbance.        Objective:Visit Vitals  /66 (BP Location: [facility] [place], Patient Position: Sitting, Cuff Size: Regular)   Pulse 63   Temp 98.9 °F (37.2 °C) (Tympanic)   Ht 5' 10\" (1.778 m)   Wt 61.2 kg   SpO2 98%   BMI 19.37 kg/m²       Physical Examination:  Vital Signs:    Visit Vitals  /66 (BP Location: [facility] [place], Patient Position: Sitting, Cuff Size: Regular)   Pulse 63   Temp 98.9 °F (37.2 °C) (Tympanic)   Ht 5' 10\" (1.778 m)   Wt 61.2 kg   SpO2 98%   BMI 19.37 kg/m²     General:   Alert, cooperative, conversive in no acute distress.  Skin:  Warm and dry without rash.    Head:  Normocephalic-atraumatic.   Neck:  Trachea is midline.   Eyes:  Normal conjunctiva and sclera.    ENT:  Mucous membranes are moist.    Cardiovascular:   Regular, rate and rhythm without murmur.  Respiratory:  Normal respiratory effort.  CTA.  No wheezes, rales or rhonchi.  Gastrointestinal:  Inspection: Normal  Musculoskeletal:  No deformity or edema.    Neurologic:   Orientated x 3.  Cranial nerves 2-12 are grossly intact.  No focal deficits.  Psychiatric:   Cooperative.  Appropriate mood & affect.      Assessment:  1. Muscle cramping    2. Chronic pain of multiple joints    3. Atrial fibrillation, unspecified type (CMS/HCC)    4. Essential hypertension    5. Need for vaccination        Plan:  1. Muscle cramping/chronic pain of multiple sites. Medication has been changed to  20 mg Oral At Bedtime     ceFEPIme (MAXIPIME) IV  2 g Intravenous Q24H     fluticasone-vilanterol  1 puff Inhalation Daily     [Held by provider] lisinopril  2.5 mg Oral Daily     metoprolol succinate ER  25 mg Oral Daily     nicotine  1 patch Transdermal Daily     nicotine   Transdermal Q8H     pantoprazole  40 mg Oral QAM AC     sodium chloride (PF)  3 mL Intracatheter Q8H     tiotropium  2 puff Inhalation Daily     venlafaxine  150 mg Oral Daily     venlafaxine  75 mg Oral Daily       Data   Recent Labs   Lab 12/05/21  0642 12/04/21  0954   WBC 17.8* 19.4*   HGB 10.4* 10.8*   MCV 93 92   * 403    132*   POTASSIUM 4.0 4.1   CHLORIDE 102 95*   CO2 20* 26   BUN 42* 39*   CR 2.13* 2.67*   ANIONGAP 13 11   DARRIUS 9.0 9.6   GLC 76 113*   ALBUMIN  --  3.1*   PROTTOTAL  --  6.8   BILITOTAL  --  0.4   ALKPHOS  --  88   ALT  --  10   AST  --  13       Recent Results (from the past 24 hour(s))   XR Chest Port 1 View    Narrative    PROCEDURE INFORMATION:   Exam: XR Chest   Exam date and time: 12/4/2021 10:03 AM   Age: 76 years old   Clinical indication: Shortness of breath; Additional info: SOB     TECHNIQUE:   Imaging protocol: XR of the chest.   Views: 1 view.     COMPARISON:   CR XR CHEST PORT 1 VIEW 10/26/2021 9:28 PM     FINDINGS:   Lungs: Infiltrate seen previously at the lung bases are still present but   improved but there is a new area of significant confluent infiltrate lateral to   the left hilum. In the proper clinical setting the findings are consistent with   pneumonia.   Pleural spaces: Unremarkable. No pleural effusion. No pneumothorax.   Heart/Mediastinum: Unremarkable. No cardiomegaly.   Bones/joints: Unremarkable.       Impression    IMPRESSION:   Infiltrate seen previously at the lung bases are still present but improved but   there is a new area of significant confluent infiltrate lateral to the left   hilum. In the proper clinical setting the findings are consistent with   pneumonia.     THIS  hydrocodone 10 mg without Tylenol. Labs have been ordered for the patient to be added to his previous labs. A urine drug tox has also been ordered.  2. Alcohol abuse. Risks of continued use were discussed at great length during the visit. Patient expressed understanding and agrees to abstain from the use of alcohol. He is also encouraged to go to AA.   3. Essential hypertension/A. fib. Continue as prescribed.     Orders Placed This Encounter   • Influenza Enhanced/High Dose Split Virus Preserv Free IM Vaccine (83879)   • Magnesium Level   • Phosphorus Level   • digoxin (LANOXIN) 0.125 MG tablet   • amLODIPine (NORVASC) 5 MG tablet   • HYDROcodone bitartrate 10 MG extended-release 12-hr capsule     Return in about 3 months (around 2/27/2018), or if symptoms worsen or fail to improve.         DOCUMENT HAS BEEN ELECTRONICALLY SIGNED BY JULES BAIRES MD

## 2021-12-05 NOTE — PROGRESS NOTES
SAFETY CHECKLIST  ID Bands and Risk clasps correct and in place (DNR, Fall risk, Allergy, Latex, Limb):  Yes  All Lines Reconciled and labeled correctly: Yes  Whiteboard updated:Yes  Environmental interventions (bed/chair alarm on, call light, side rails, restraints, sitter....): Yes   Tele number [phone]  Donya Gomez RN on 12/5/2021 at 7:19 AM

## 2021-12-05 NOTE — PROGRESS NOTES
/44 (BP Location: Left arm)   Pulse 86   Temp 97.6  F (36.4  C) (Tympanic)   Resp 20   Wt 52 kg (114 lb 11.2 oz)   SpO2 90%   BMI 17.96 kg/m      Continue to be unable to get good pulse oximetry reading.  Did get some brief readings in 90s.  Patient continues to deny feeling short of breath.  Will leave patient on 2L via NC and continue to monitor any symptoms of respiratory distress.  Blood pressure improving after bolus.

## 2021-12-05 NOTE — PHARMACY-ADMISSION MEDICATION HISTORY
Pharmacy -- Admission Medication Reconciliation    Prior to admission (PTA) medications were reviewed and the patient's PTA medication list was updated.    Sources Consulted: chart review, patient interview, sure scripts    The reliability of this Medication Reconciliation is: Reliability: Reliable    The following significant changes were made:    Removed:    Clonazepam    Lidocaine patch    Lisinopril (per patient and Dr. Calderon visit on 11/29/21)    Lorazepam    Nicotine patch    Prednisone    Rivaroxaban (finished course)    Updated: triamcinolone cream to PRN    In addition, the patient's allergies were reviewed with the patient and updated as follows:   Allergies: Gabapentin, Latex, Morphine, Codeine, Diphenhydramine, Nortriptyline, and Sucralfate    The pharmacist has reviewed with the patient that all personal medications should be removed from the building or locked in the belongings safe.  Patient shall only take medications ordered by the physician and administered by the nursing staff.       Medication barriers identified: patient has been recently hospitalized and then to a SNF and then home with multiple medication changes   Medication adherence concerns: patient unsure when she last took medications and was unsure about supply at home   Understanding of emergency medications: uses albuterol 4-5x daily     Marisela Brian DOUGLAS, 12/5/2021,  10:13 AM

## 2021-12-06 ENCOUNTER — APPOINTMENT (OUTPATIENT)
Dept: OCCUPATIONAL THERAPY | Facility: OTHER | Age: 76
DRG: 194 | End: 2021-12-06
Payer: COMMERCIAL

## 2021-12-06 ENCOUNTER — APPOINTMENT (OUTPATIENT)
Dept: PHYSICAL THERAPY | Facility: OTHER | Age: 76
DRG: 194 | End: 2021-12-06
Payer: COMMERCIAL

## 2021-12-06 LAB
ANION GAP SERPL CALCULATED.3IONS-SCNC: 6 MMOL/L (ref 3–14)
ATRIAL RATE - MUSE: 88 BPM
BASOPHILS # BLD AUTO: 0.1 10E3/UL (ref 0–0.2)
BASOPHILS NFR BLD AUTO: 0 %
BUN SERPL-MCNC: 33 MG/DL (ref 7–25)
CALCIUM SERPL-MCNC: 8.5 MG/DL (ref 8.6–10.3)
CHLORIDE BLD-SCNC: 112 MMOL/L (ref 98–107)
CO2 SERPL-SCNC: 20 MMOL/L (ref 21–31)
CREAT SERPL-MCNC: 1.09 MG/DL (ref 0.6–1.2)
CRP SERPL-MCNC: 156.2 MG/L
DIASTOLIC BLOOD PRESSURE - MUSE: NORMAL MMHG
EOSINOPHIL # BLD AUTO: 0 10E3/UL (ref 0–0.7)
EOSINOPHIL NFR BLD AUTO: 0 %
ERYTHROCYTE [DISTWIDTH] IN BLOOD BY AUTOMATED COUNT: 16.2 % (ref 10–15)
GFR SERPL CREATININE-BSD FRML MDRD: 50 ML/MIN/1.73M2
GLUCOSE BLD-MCNC: 92 MG/DL (ref 70–105)
HCT VFR BLD AUTO: 28.5 % (ref 35–47)
HGB BLD-MCNC: 8.9 G/DL (ref 11.7–15.7)
IMM GRANULOCYTES # BLD: 0.2 10E3/UL
IMM GRANULOCYTES NFR BLD: 2 %
INTERPRETATION ECG - MUSE: NORMAL
LYMPHOCYTES # BLD AUTO: 1.2 10E3/UL (ref 0.8–5.3)
LYMPHOCYTES NFR BLD AUTO: 8 %
MAGNESIUM SERPL-MCNC: 1.7 MG/DL (ref 1.9–2.7)
MCH RBC QN AUTO: 29 PG (ref 26.5–33)
MCHC RBC AUTO-ENTMCNC: 31.2 G/DL (ref 31.5–36.5)
MCV RBC AUTO: 93 FL (ref 78–100)
MONOCYTES # BLD AUTO: 0.8 10E3/UL (ref 0–1.3)
MONOCYTES NFR BLD AUTO: 5 %
NEUTROPHILS # BLD AUTO: 13.4 10E3/UL (ref 1.6–8.3)
NEUTROPHILS NFR BLD AUTO: 85 %
NRBC # BLD AUTO: 0 10E3/UL
NRBC BLD AUTO-RTO: 0 /100
P AXIS - MUSE: 77 DEGREES
PLATELET # BLD AUTO: 386 10E3/UL (ref 150–450)
POTASSIUM BLD-SCNC: 4.1 MMOL/L (ref 3.5–5.1)
PR INTERVAL - MUSE: 142 MS
QRS DURATION - MUSE: 90 MS
QT - MUSE: 446 MS
QTC - MUSE: 539 MS
R AXIS - MUSE: 83 DEGREES
RBC # BLD AUTO: 3.07 10E6/UL (ref 3.8–5.2)
S PNEUM AG SPEC QL: NEGATIVE
SODIUM SERPL-SCNC: 138 MMOL/L (ref 134–144)
SYSTOLIC BLOOD PRESSURE - MUSE: NORMAL MMHG
T AXIS - MUSE: 42 DEGREES
VENTRICULAR RATE- MUSE: 88 BPM
WBC # BLD AUTO: 15.7 10E3/UL (ref 4–11)

## 2021-12-06 PROCEDURE — 999N000157 HC STATISTIC RCP TIME EA 10 MIN

## 2021-12-06 PROCEDURE — 36415 COLL VENOUS BLD VENIPUNCTURE: CPT | Performed by: FAMILY MEDICINE

## 2021-12-06 PROCEDURE — 258N000003 HC RX IP 258 OP 636: Performed by: FAMILY MEDICINE

## 2021-12-06 PROCEDURE — 86140 C-REACTIVE PROTEIN: CPT | Performed by: FAMILY MEDICINE

## 2021-12-06 PROCEDURE — 250N000013 HC RX MED GY IP 250 OP 250 PS 637: Performed by: FAMILY MEDICINE

## 2021-12-06 PROCEDURE — 97530 THERAPEUTIC ACTIVITIES: CPT | Mod: GP

## 2021-12-06 PROCEDURE — 80048 BASIC METABOLIC PNL TOTAL CA: CPT | Performed by: FAMILY MEDICINE

## 2021-12-06 PROCEDURE — 85025 COMPLETE CBC W/AUTO DIFF WBC: CPT | Performed by: FAMILY MEDICINE

## 2021-12-06 PROCEDURE — 120N000001 HC R&B MED SURG/OB

## 2021-12-06 PROCEDURE — 250N000011 HC RX IP 250 OP 636: Performed by: FAMILY MEDICINE

## 2021-12-06 PROCEDURE — 99232 SBSQ HOSP IP/OBS MODERATE 35: CPT | Performed by: FAMILY MEDICINE

## 2021-12-06 PROCEDURE — 83735 ASSAY OF MAGNESIUM: CPT | Performed by: FAMILY MEDICINE

## 2021-12-06 PROCEDURE — 94640 AIRWAY INHALATION TREATMENT: CPT

## 2021-12-06 RX ADMIN — ALPRAZOLAM 0.5 MG: 0.5 TABLET ORAL at 13:57

## 2021-12-06 RX ADMIN — METOPROLOL SUCCINATE 25 MG: 25 TABLET, EXTENDED RELEASE ORAL at 10:41

## 2021-12-06 RX ADMIN — SODIUM CHLORIDE: 9 INJECTION, SOLUTION INTRAVENOUS at 09:31

## 2021-12-06 RX ADMIN — VENLAFAXINE HYDROCHLORIDE 150 MG: 75 CAPSULE, EXTENDED RELEASE ORAL at 10:41

## 2021-12-06 RX ADMIN — FLUTICASONE FUROATE AND VILANTEROL TRIFENATATE 1 PUFF: 200; 25 POWDER RESPIRATORY (INHALATION) at 08:09

## 2021-12-06 RX ADMIN — TRAMADOL HYDROCHLORIDE 50 MG: 50 TABLET, COATED ORAL at 19:50

## 2021-12-06 RX ADMIN — CEFEPIME HYDROCHLORIDE 2 G: 2 INJECTION, POWDER, FOR SOLUTION INTRAVENOUS at 13:59

## 2021-12-06 RX ADMIN — PANTOPRAZOLE SODIUM 40 MG: 40 TABLET, DELAYED RELEASE ORAL at 08:57

## 2021-12-06 RX ADMIN — NICOTINE 1 PATCH: 14 PATCH, EXTENDED RELEASE TRANSDERMAL at 10:44

## 2021-12-06 RX ADMIN — VENLAFAXINE HYDROCHLORIDE 75 MG: 75 CAPSULE, EXTENDED RELEASE ORAL at 10:41

## 2021-12-06 RX ADMIN — ALPRAZOLAM 0.5 MG: 0.5 TABLET ORAL at 21:26

## 2021-12-06 RX ADMIN — ASPIRIN 81 MG: 81 TABLET, COATED ORAL at 10:41

## 2021-12-06 RX ADMIN — ACETAMINOPHEN 975 MG: 325 TABLET, FILM COATED ORAL at 21:26

## 2021-12-06 RX ADMIN — TIOTROPIUM BROMIDE INHALATION SPRAY 2 PUFF: 3.12 SPRAY, METERED RESPIRATORY (INHALATION) at 08:09

## 2021-12-06 RX ADMIN — TRAMADOL HYDROCHLORIDE 50 MG: 50 TABLET, COATED ORAL at 09:31

## 2021-12-06 ASSESSMENT — ACTIVITIES OF DAILY LIVING (ADL)
ADLS_ACUITY_SCORE: 15
ADLS_ACUITY_SCORE: 17
ADLS_ACUITY_SCORE: 15
ADLS_ACUITY_SCORE: 14
ADLS_ACUITY_SCORE: 15
ADLS_ACUITY_SCORE: 17
ADLS_ACUITY_SCORE: 15
ADLS_ACUITY_SCORE: 14
ADLS_ACUITY_SCORE: 17
ADLS_ACUITY_SCORE: 14
ADLS_ACUITY_SCORE: 17
ADLS_ACUITY_SCORE: 15
ADLS_ACUITY_SCORE: 15
ADLS_ACUITY_SCORE: 17
ADLS_ACUITY_SCORE: 15

## 2021-12-06 NOTE — PROGRESS NOTES
Clinical Nutrition / Initial Assessment     Reason for Assessment:  Screened at nutritional risk due to:  Eating poorly due to decreased appetite    Assessment:   Client History:  Pt with pneumonia, recently hospitalized for acute respiratory failure. Was at [facility] but recently returned home. Noted poor intakes with nausea/vomiting a couple days prior to coming in. Feeling weak but appetite improving.   Diet Order:  Regular  Oral Intake:  No intakes recorded, notes indicate improvement   Supplement Intake:  Boost/Ensure TID on trays   Weight:   Wt Readings from Last 10 Encounters:   12/06/21 53.8 kg (118 lb 9.6 oz)   11/02/21 50.7 kg (111 lb 11.2 oz)   08/26/21 50.6 kg (111 lb 9.6 oz)   12/27/19 53.5 kg (118 lb)   10/11/18 52.8 kg (116 lb 6.4 oz)   03/12/18 51 kg (112 lb 7 oz)   01/06/16 54.3 kg (119 lb 9.6 oz)   12/22/15 54 kg (119 lb)   11/30/15 54.1 kg (119 lb 3.2 oz)   11/12/15 54.7 kg (120 lb 9.6 oz)   Height: Data Unavailable  BMI: Body mass index is 18.58 kg/m .    Estimated nutritional needs based on:  current body weight  54 kg / 118 lbs   Estimated energy needs:  4967-7806 kcal/day (30-35 kcal/kg)  Estimated protein needs:  54-65 gm/day (1-1.2 g/kg)  Estimated fluid needs:  3634-5444 ml/day (1 ml/kcal)    Malnutrition Criteria:  (Need to have 2 indicators to qualify recommendation)  Energy Intake:  Acute Moderate: < 75% of estimated energy requirement for > 7days  Interpretation of Weight Loss:  No significant weight loss  Physical Findings:  Chronic Body Fat Loss:  mild and Chronic Muscle Mass Loss:  mild  Reduced  Strength:  likely reduced with weakness and current illness.    Recommended Nutrition Diagnosis:   Moderate Malnutrition in the context of acute illness or injury - based on AND/ASPEN Clinical Characterstics of Malnutrition May 2012      Nutrition Education: Nutrition education will be provided as appropriate.    Nutrition Diagnosis: Oral or Nutrition Support Intake:  inadequate  energy intakes related to decreased appetite as evidenced by intakes at home poor over past few days with nausea .    Intervention:  Nutrition Prescription:     Nutrition Intervention(s):Recommended general, healthful diet  1. Meals and Snacks: small frequent meals/snacks   2. Medical Food Supplement: boost/ensure TID on trays     Nutrition Goal(s):  1. Pt will tolerate diet as ordered   2. Pt will consume 50% or more at meals and supplements   3. Pt will not have unplanned wt loss during hospitalization     Monitoring and Evaluation:   Food Intake , weights, diet tolerance    Discharge Recommendation:   Nutrition Discharge Planning  recommend supplements on discharge if intakes are less than 50% at meals.     RD will reassess in within 1-5 days or sooner.    Monse Sun RD on 12/6/2021 at 12:03 PM

## 2021-12-06 NOTE — PLAN OF CARE
/70 (BP Location: Left arm)   Pulse 90   Temp 97.4  F (36.3  C) (Tympanic)   Resp 20   Wt 53.8 kg (118 lb 9.6 oz)   SpO2 99%   BMI 18.58 kg/m      VSS, afebrile, denies pain at assessment.  Did complain of some aching pain in her lower back this morning, ultram helped with this pain.  Alert, oriented, pleasant for the most part.  Tolerating regular diet, IV antibiotics and oral medications.  Has been up and moving around the room some today and up to the bathroom several times with SBA.  IV fluids turned down to 25 mL/hr this morning.  LS clear, diminished in the bases, BS active, HR regular.  Mishel Emmanuel RN............................ 12/6/2021 5:38 PM

## 2021-12-06 NOTE — PROGRESS NOTES
[facility]    Medicine Progress Note - Hospitalist Service       Date of Admission:  12/4/2021    Assessment & Plan           Pneumonia of left lung due to infectious organism, unspecified part of lung  Being treated for HCAP with recent hospitalization and NH stay  Currently on cefipime, cultures pending  Clinically better, but still complaining of cough, feeling ill  Plan is to continue current antibiotics, await cultures    COPD (chronic obstructive pulmonary disease) (H)  No clinical signs of acute exacerbation  Continue home meds, Nebs, steroid inhalers    Hypomagnesemia  Low again today at 1.7, being replaced    Acute kidney injury (H)  Resolved with fluids    SUSAN (generalized anxiety disorder)  Chronic and stable  Using prn xanax and ongoing effexor    Tobacco abuse  Ongoing use, on nicoderm patch replacement           Diet: Combination Diet Regular Diet Adult  Snacks/Supplements Adult: Ensure Enlive; With Meals    DVT Prophylaxis: Pneumatic Compression Devices  Llamas Catheter: Not present  Central Lines: None  Code Status: Full Code      Disposition Plan   Expected discharge:  2-3 days recommended to prior living arrangement once antibiotic plan established and hemoglobin stable.     The patient's care was discussed with the Patient.    Alin Garcia MD  Hospitalist Service  [facility]  Securely message with the Vocera Web Console (learn more here)  Text page via [employer] Paging/Directory        Clinically Significant Risk Factors Present on Admission               ______________________________________________________________________    Interval History   Feeling somewhat worse today complaining of cough and back pain. Appetite better, no nausea. Feeling weak, sleeping okay. Currently on cefipime. WBC better, no fevers, oxygen at 0.5 LPM    Data reviewed today: I reviewed all medications, new labs and imaging results over the last 24 hours. I personally  reviewed no images or EKG's today.    Physical Exam   Vital Signs: Temp: 98.5  F (36.9  C) Temp src: Tympanic BP: 126/62 Pulse: 93   Resp: 20 SpO2: 99 % O2 Device: Nasal cannula Oxygen Delivery: 1.5 LPM  Weight: 118 lbs 9.6 oz  Constitutional: awake, alert, cooperative and no apparent distress  Respiratory: no increased work of breathing, good air exchange and no retractions, no wheezing  Cardiovascular: regular rate and rhythm  GI: normal bowel sounds, soft, non-distended and non-tender    Data   Recent Labs   Lab 12/06/21  0714 12/05/21  0642 12/04/21  0954   WBC 15.7* 17.8* 19.4*   HGB 8.9* 10.4* 10.8*   MCV 93 93 92    477* 403    135 132*   POTASSIUM 4.1 4.0 4.1   CHLORIDE 112* 102 95*   CO2 20* 20* 26   BUN 33* 42* 39*   CR 1.09 2.13* 2.67*   ANIONGAP 6 13 11   DARRIUS 8.5* 9.0 9.6   GLC 92 76 113*   ALBUMIN  --   --  3.1*   PROTTOTAL  --   --  6.8   BILITOTAL  --   --  0.4   ALKPHOS  --   --  88   ALT  --   --  10   AST  --   --  13

## 2021-12-06 NOTE — PROGRESS NOTES
:    Anticipated discharge home with [facility] in 2-3 days.  Home Care will need new orders at discharge.     YASMANI Guallpa on 12/6/2021 at 3:24 PM

## 2021-12-06 NOTE — PROGRESS NOTES
12/06/21 1500   Signing Clinician's Name / Credentials   Signing clinician's name / credentials Abdullahi Rolle MPT   Quick Adds   Rehab Discipline PT   Quick Adds Mobility;Therapeutic Activity   Therapeutic Activity   Treatment Detail repositioning in bed with moderate assist   PT Discharge Planning    PT Discharge Recommendation (DC Rec) home with assist;home with home care physical therapy   PT Rationale for DC Rec to promote strength, stability and safe mobility   Additional Documentation   Rehab Comments patient did not wish to participate with PT today due to her report of increased faitgue   PT Plan continue PT   Total Session Time   Total Session Time (minutes) 15 minutes

## 2021-12-06 NOTE — PLAN OF CARE
Patient Sp02 >92% on 2L via NC.  Lungs are clear and diminished in bases.  Dyspnea with exertion, denies at rest.  Given xanax for anxiety which was effective for relief and patient was able to rest throughout the night.  Blood pressures have improved with systolics 100-120s.  Denies any pain this shift.

## 2021-12-06 NOTE — ASSESSMENT & PLAN NOTE
Being treated for HCAP with recent hospitalization and NH stay  During hospitalization was treated with cefepime, IV  Improved slowly with normalization of respiratory status, not requiring oxygen  White count normalized, cultures were negative  At discharge was able to ambulate safely will be discharged home to finish out a course of oral Ceftin  Because of weakness, last at home health care work with her at home.  During this hospitalization she was evaluated by PT/OT and determined safe to return home with some supervision.

## 2021-12-06 NOTE — PROGRESS NOTES
Patient remains in bed through most of shift, up to bathroom with minimal sba. Remains on 2LO2 which is chronic for her. No further issues,   Donya Gomez RN on 12/5/2021 at 6:46 PM

## 2021-12-06 NOTE — PROGRESS NOTES
SAFETY CHECKLIST  ID Bands and Risk clasps correct and in place (DNR, Fall risk, Allergy, Latex, Limb):  Yes  All Lines Reconciled and labeled correctly: Yes  Whiteboard updated:Yes  Environmental interventions (bed/chair alarm on, call light, side rails, restraints, sitter....): Yes     Verify Tele #: [phone]

## 2021-12-06 NOTE — PROGRESS NOTES
Shift Summary  Pt on a 1.5 LPM Nasal Cannula. Breath sounds are clear/diminished. Pt Sp02 was 99%. Pt alert and oriented during visits.

## 2021-12-07 ENCOUNTER — APPOINTMENT (OUTPATIENT)
Dept: PHYSICAL THERAPY | Facility: OTHER | Age: 76
DRG: 194 | End: 2021-12-07
Payer: COMMERCIAL

## 2021-12-07 LAB
ANION GAP SERPL CALCULATED.3IONS-SCNC: 5 MMOL/L (ref 3–14)
BUN SERPL-MCNC: 23 MG/DL (ref 7–25)
CALCIUM SERPL-MCNC: 8.7 MG/DL (ref 8.6–10.3)
CHLORIDE BLD-SCNC: 108 MMOL/L (ref 98–107)
CO2 SERPL-SCNC: 24 MMOL/L (ref 21–31)
CREAT SERPL-MCNC: 0.8 MG/DL (ref 0.6–1.2)
ERYTHROCYTE [DISTWIDTH] IN BLOOD BY AUTOMATED COUNT: 16.2 % (ref 10–15)
GFR SERPL CREATININE-BSD FRML MDRD: 73 ML/MIN/1.73M2
GLUCOSE BLD-MCNC: 97 MG/DL (ref 70–105)
HCT VFR BLD AUTO: 31.1 % (ref 35–47)
HGB BLD-MCNC: 9.8 G/DL (ref 11.7–15.7)
MCH RBC QN AUTO: 29.2 PG (ref 26.5–33)
MCHC RBC AUTO-ENTMCNC: 31.5 G/DL (ref 31.5–36.5)
MCV RBC AUTO: 93 FL (ref 78–100)
PLATELET # BLD AUTO: 419 10E3/UL (ref 150–450)
POTASSIUM BLD-SCNC: 4.5 MMOL/L (ref 3.5–5.1)
RBC # BLD AUTO: 3.36 10E6/UL (ref 3.8–5.2)
SODIUM SERPL-SCNC: 137 MMOL/L (ref 134–144)
WBC # BLD AUTO: 14.2 10E3/UL (ref 4–11)

## 2021-12-07 PROCEDURE — 99232 SBSQ HOSP IP/OBS MODERATE 35: CPT | Performed by: FAMILY MEDICINE

## 2021-12-07 PROCEDURE — 36415 COLL VENOUS BLD VENIPUNCTURE: CPT | Performed by: FAMILY MEDICINE

## 2021-12-07 PROCEDURE — 120N000001 HC R&B MED SURG/OB

## 2021-12-07 PROCEDURE — 97116 GAIT TRAINING THERAPY: CPT | Mod: GP

## 2021-12-07 PROCEDURE — 97530 THERAPEUTIC ACTIVITIES: CPT | Mod: GP

## 2021-12-07 PROCEDURE — 94640 AIRWAY INHALATION TREATMENT: CPT

## 2021-12-07 PROCEDURE — 250N000011 HC RX IP 250 OP 636: Performed by: FAMILY MEDICINE

## 2021-12-07 PROCEDURE — 999N000157 HC STATISTIC RCP TIME EA 10 MIN

## 2021-12-07 PROCEDURE — 250N000013 HC RX MED GY IP 250 OP 250 PS 637: Performed by: FAMILY MEDICINE

## 2021-12-07 PROCEDURE — 80048 BASIC METABOLIC PNL TOTAL CA: CPT | Performed by: FAMILY MEDICINE

## 2021-12-07 PROCEDURE — 85027 COMPLETE CBC AUTOMATED: CPT | Performed by: FAMILY MEDICINE

## 2021-12-07 RX ORDER — LISINOPRIL 5 MG/1
5 TABLET ORAL DAILY
Status: DISCONTINUED | OUTPATIENT
Start: 2021-12-07 | End: 2021-12-08 | Stop reason: HOSPADM

## 2021-12-07 RX ADMIN — ACETAMINOPHEN 975 MG: 325 TABLET, FILM COATED ORAL at 05:25

## 2021-12-07 RX ADMIN — FLUTICASONE FUROATE AND VILANTEROL TRIFENATATE 1 PUFF: 200; 25 POWDER RESPIRATORY (INHALATION) at 07:36

## 2021-12-07 RX ADMIN — ASPIRIN 81 MG: 81 TABLET, COATED ORAL at 09:10

## 2021-12-07 RX ADMIN — ATORVASTATIN CALCIUM 20 MG: 20 TABLET, FILM COATED ORAL at 22:10

## 2021-12-07 RX ADMIN — PANTOPRAZOLE SODIUM 40 MG: 40 TABLET, DELAYED RELEASE ORAL at 07:34

## 2021-12-07 RX ADMIN — ACETAMINOPHEN 975 MG: 325 TABLET, FILM COATED ORAL at 13:10

## 2021-12-07 RX ADMIN — ALPRAZOLAM 0.5 MG: 0.5 TABLET ORAL at 19:57

## 2021-12-07 RX ADMIN — METOPROLOL SUCCINATE 25 MG: 25 TABLET, EXTENDED RELEASE ORAL at 09:10

## 2021-12-07 RX ADMIN — TIOTROPIUM BROMIDE INHALATION SPRAY 2 PUFF: 3.12 SPRAY, METERED RESPIRATORY (INHALATION) at 07:36

## 2021-12-07 RX ADMIN — VENLAFAXINE HYDROCHLORIDE 75 MG: 75 CAPSULE, EXTENDED RELEASE ORAL at 09:10

## 2021-12-07 RX ADMIN — LISINOPRIL 5 MG: 5 TABLET ORAL at 10:49

## 2021-12-07 RX ADMIN — ACETAMINOPHEN 975 MG: 325 TABLET, FILM COATED ORAL at 22:10

## 2021-12-07 RX ADMIN — CEFEPIME HYDROCHLORIDE 2 G: 2 INJECTION, POWDER, FOR SOLUTION INTRAVENOUS at 13:09

## 2021-12-07 RX ADMIN — VENLAFAXINE HYDROCHLORIDE 150 MG: 75 CAPSULE, EXTENDED RELEASE ORAL at 09:11

## 2021-12-07 RX ADMIN — NICOTINE 1 PATCH: 14 PATCH, EXTENDED RELEASE TRANSDERMAL at 09:15

## 2021-12-07 ASSESSMENT — ACTIVITIES OF DAILY LIVING (ADL)
ADLS_ACUITY_SCORE: 19
ADLS_ACUITY_SCORE: 19
ADLS_ACUITY_SCORE: 21
ADLS_ACUITY_SCORE: 21
ADLS_ACUITY_SCORE: 19
ADLS_ACUITY_SCORE: 19
ADLS_ACUITY_SCORE: 21
ADLS_ACUITY_SCORE: 15
ADLS_ACUITY_SCORE: 21
ADLS_ACUITY_SCORE: 17
ADLS_ACUITY_SCORE: 19
ADLS_ACUITY_SCORE: 21
ADLS_ACUITY_SCORE: 17
ADLS_ACUITY_SCORE: 19
ADLS_ACUITY_SCORE: 19
ADLS_ACUITY_SCORE: 21
ADLS_ACUITY_SCORE: 21
ADLS_ACUITY_SCORE: 19
ADLS_ACUITY_SCORE: 17
ADLS_ACUITY_SCORE: 21
ADLS_ACUITY_SCORE: 21

## 2021-12-07 NOTE — ASSESSMENT & PLAN NOTE
Treated at home with metoprolol, blood pressures have been elevated  Per pharmacy, previously on lisinopril  Patient started on low-dose lisinopril in addition to metoprolol with good control  At discharge will continue low-dose lisinopril.

## 2021-12-07 NOTE — PROGRESS NOTES
[facility]    Medicine Progress Note - Hospitalist Service       Date of Admission:  12/4/2021    Assessment & Plan           Pneumonia of left lung due to infectious organism, unspecified part of lung  Being treated for HCAP with recent hospitalization and NH stay  Currently on cefipime, cultures pending  Clinically better, but still complaining of cough, feeling ill, still low amounts of oxygen  Plan is to continue current antibiotics, await cultures  Hope to return home, will have PT/OT eval    COPD (chronic obstructive pulmonary disease) (H)  No clinical signs of acute exacerbation  Continue home meds, Nebs, steroid inhalers    Hypomagnesemia  Low again today at 1.7, being replaced    Acute kidney injury (H)  Resolved with fluids    SUSAN (generalized anxiety disorder)  Chronic and stable  Using prn xanax and ongoing effexor    Tobacco abuse  Ongoing use, on nicoderm patch replacement    Hypertension  Treated at home with metoprolol, blood pressures have been elevated  Per pharmacy, previously on lisinopril  Low dose lisinopril ordered, follow pressures           Diet: Combination Diet Regular Diet Adult  Snacks/Supplements Adult: Ensure Enlive; With Meals    DVT Prophylaxis: Pneumatic Compression Devices  Llamas Catheter: Not present  Central Lines: None  Code Status: Full Code      Disposition Plan   Expected discharge:  1-2 days recommended to prior living arrangement once antibiotic plan established and mental status at baseline.     The patient's care was discussed with the Patient.    Alin Garcia MD  Hospitalist Service  [facility]  Securely message with the Vocera Web Console (learn more here)  Text page via [employer] Paging/Directory        Clinically Significant Risk Factors Present on Admission           # Non-Severe Malnutrition, POA: based on Reduced intake;Subcutaneous fat loss;Muscle loss; strength (12/06/21 1200)      ______________________________________________________________________    Interval History   Feeling somewhat better. Has deep at times productive cough. On low dose of oxygen, 1.5 LPM. Appetite better, having some diarrhea. No abdominal pain. WBC trending down, Hgb stqable    Data reviewed today: I reviewed all medications, new labs and imaging results over the last 24 hours. I personally reviewed no images or EKG's today.    Physical Exam   Vital Signs: Temp: 97  F (36.1  C) Temp src: Tympanic BP: (!) 144/83 Pulse: 95   Resp: 20 SpO2: 96 % O2 Device: Nasal cannula Oxygen Delivery: 1.5 LPM  Weight: 120 lbs 14.4 oz  General Appearance: In no apparent distress, sitting up eating  Respiratory: crackle at bases, no wheeze  Cardiovascular: RRR no murmur  GI: Abd soft, no tenderness  Skin: no rashes or bruising  Other:      Data   Recent Labs   Lab 12/07/21  0534 12/06/21  0714 12/05/21  0642 12/04/21  0954   WBC 14.2* 15.7* 17.8* 19.4*   HGB 9.8* 8.9* 10.4* 10.8*   MCV 93 93 93 92    386 477* 403    138 135 132*   POTASSIUM 4.5 4.1 4.0 4.1   CHLORIDE 108* 112* 102 95*   CO2 24 20* 20* 26   BUN 23 33* 42* 39*   CR 0.80 1.09 2.13* 2.67*   ANIONGAP 5 6 13 11   DARRIUS 8.7 8.5* 9.0 9.6   GLC 97 92 76 113*   ALBUMIN  --   --   --  3.1*   PROTTOTAL  --   --   --  6.8   BILITOTAL  --   --   --  0.4   ALKPHOS  --   --   --  88   ALT  --   --   --  10   AST  --   --   --  13

## 2021-12-07 NOTE — PLAN OF CARE
"Patient A&O, standby assist. Here for pneumonia following covid recovery. VSS, elevated BP's overnight, otherwise vitally stable. Patient incontinent of both bowel and bladder overnight, states staff did not get to her room in time however her call light was answered very promptly and patient was soiled by that time. Became tearful overnight after incontinent episode stating \"I don't know why I am doing this.\" Empathic listening provided, patient reassured. No further needs at this time.    BP (!) 153/84 (BP Location: Left arm, Patient Position: Supine)   Pulse 93   Temp 97.7  F (36.5  C) (Tympanic)   Resp 18   Wt 54.8 kg (120 lb 14.4 oz)   SpO2 94%   BMI 18.94 kg/m      Camila Rodrigez RN on 12/7/2021 at 3:52 AM    "

## 2021-12-08 ENCOUNTER — APPOINTMENT (OUTPATIENT)
Dept: PHYSICAL THERAPY | Facility: OTHER | Age: 76
DRG: 194 | End: 2021-12-08
Payer: COMMERCIAL

## 2021-12-08 ENCOUNTER — APPOINTMENT (OUTPATIENT)
Dept: OCCUPATIONAL THERAPY | Facility: OTHER | Age: 76
DRG: 194 | End: 2021-12-08
Payer: COMMERCIAL

## 2021-12-08 VITALS
DIASTOLIC BLOOD PRESSURE: 79 MMHG | SYSTOLIC BLOOD PRESSURE: 124 MMHG | RESPIRATION RATE: 18 BRPM | WEIGHT: 117.4 LBS | TEMPERATURE: 99 F | HEART RATE: 109 BPM | BODY MASS INDEX: 18.39 KG/M2 | OXYGEN SATURATION: 96 %

## 2021-12-08 DIAGNOSIS — I10 PRIMARY HYPERTENSION: ICD-10-CM

## 2021-12-08 LAB
ANION GAP SERPL CALCULATED.3IONS-SCNC: 7 MMOL/L (ref 3–14)
BUN SERPL-MCNC: 19 MG/DL (ref 7–25)
CALCIUM SERPL-MCNC: 9 MG/DL (ref 8.6–10.3)
CHLORIDE BLD-SCNC: 104 MMOL/L (ref 98–107)
CO2 SERPL-SCNC: 26 MMOL/L (ref 21–31)
CREAT SERPL-MCNC: 0.72 MG/DL (ref 0.6–1.2)
ERYTHROCYTE [DISTWIDTH] IN BLOOD BY AUTOMATED COUNT: 16.3 % (ref 10–15)
GFR SERPL CREATININE-BSD FRML MDRD: 83 ML/MIN/1.73M2
GLUCOSE BLD-MCNC: 96 MG/DL (ref 70–105)
HCT VFR BLD AUTO: 33.5 % (ref 35–47)
HGB BLD-MCNC: 10.6 G/DL (ref 11.7–15.7)
LACTATE SERPL-SCNC: 1 MMOL/L (ref 0.7–2)
MCH RBC QN AUTO: 28.7 PG (ref 26.5–33)
MCHC RBC AUTO-ENTMCNC: 31.6 G/DL (ref 31.5–36.5)
MCV RBC AUTO: 91 FL (ref 78–100)
PLATELET # BLD AUTO: 457 10E3/UL (ref 150–450)
POTASSIUM BLD-SCNC: 5 MMOL/L (ref 3.5–5.1)
RBC # BLD AUTO: 3.69 10E6/UL (ref 3.8–5.2)
SODIUM SERPL-SCNC: 137 MMOL/L (ref 134–144)
WBC # BLD AUTO: 12 10E3/UL (ref 4–11)

## 2021-12-08 PROCEDURE — 36415 COLL VENOUS BLD VENIPUNCTURE: CPT | Performed by: FAMILY MEDICINE

## 2021-12-08 PROCEDURE — 250N000013 HC RX MED GY IP 250 OP 250 PS 637: Performed by: FAMILY MEDICINE

## 2021-12-08 PROCEDURE — 83605 ASSAY OF LACTIC ACID: CPT | Performed by: FAMILY MEDICINE

## 2021-12-08 PROCEDURE — 97535 SELF CARE MNGMENT TRAINING: CPT | Mod: GO | Performed by: OCCUPATIONAL THERAPIST

## 2021-12-08 PROCEDURE — 99239 HOSP IP/OBS DSCHRG MGMT >30: CPT | Performed by: FAMILY MEDICINE

## 2021-12-08 PROCEDURE — 80048 BASIC METABOLIC PNL TOTAL CA: CPT | Performed by: FAMILY MEDICINE

## 2021-12-08 PROCEDURE — 94640 AIRWAY INHALATION TREATMENT: CPT

## 2021-12-08 PROCEDURE — 85049 AUTOMATED PLATELET COUNT: CPT | Performed by: FAMILY MEDICINE

## 2021-12-08 PROCEDURE — 97530 THERAPEUTIC ACTIVITIES: CPT | Mod: GP

## 2021-12-08 PROCEDURE — 999N000157 HC STATISTIC RCP TIME EA 10 MIN

## 2021-12-08 PROCEDURE — 258N000003 HC RX IP 258 OP 636: Performed by: FAMILY MEDICINE

## 2021-12-08 PROCEDURE — 97116 GAIT TRAINING THERAPY: CPT | Mod: GP

## 2021-12-08 RX ORDER — CEFUROXIME AXETIL 500 MG/1
500 TABLET ORAL 2 TIMES DAILY
Qty: 14 TABLET | Refills: 0 | Status: SHIPPED | OUTPATIENT
Start: 2021-12-08 | End: 2021-12-15

## 2021-12-08 RX ORDER — LISINOPRIL 5 MG/1
5 TABLET ORAL DAILY
Qty: 30 TABLET | Refills: 0 | Status: SHIPPED | OUTPATIENT
Start: 2021-12-09

## 2021-12-08 RX ADMIN — VENLAFAXINE HYDROCHLORIDE 150 MG: 75 CAPSULE, EXTENDED RELEASE ORAL at 09:28

## 2021-12-08 RX ADMIN — METOPROLOL SUCCINATE 25 MG: 25 TABLET, EXTENDED RELEASE ORAL at 09:27

## 2021-12-08 RX ADMIN — NICOTINE 1 PATCH: 14 PATCH, EXTENDED RELEASE TRANSDERMAL at 09:27

## 2021-12-08 RX ADMIN — ALPRAZOLAM 0.5 MG: 0.5 TABLET ORAL at 09:28

## 2021-12-08 RX ADMIN — TRAMADOL HYDROCHLORIDE 50 MG: 50 TABLET, COATED ORAL at 00:43

## 2021-12-08 RX ADMIN — SODIUM CHLORIDE: 9 INJECTION, SOLUTION INTRAVENOUS at 00:41

## 2021-12-08 RX ADMIN — ASPIRIN 81 MG: 81 TABLET, COATED ORAL at 09:27

## 2021-12-08 RX ADMIN — PANTOPRAZOLE SODIUM 40 MG: 40 TABLET, DELAYED RELEASE ORAL at 08:59

## 2021-12-08 RX ADMIN — DEXTROMETHORPHAN HYDROBROMIDE, GUAIFENESIN 10 ML: 20; 200 SOLUTION ORAL at 00:45

## 2021-12-08 RX ADMIN — VENLAFAXINE HYDROCHLORIDE 75 MG: 75 CAPSULE, EXTENDED RELEASE ORAL at 09:31

## 2021-12-08 RX ADMIN — FLUTICASONE FUROATE AND VILANTEROL TRIFENATATE 1 PUFF: 200; 25 POWDER RESPIRATORY (INHALATION) at 06:45

## 2021-12-08 RX ADMIN — LISINOPRIL 5 MG: 5 TABLET ORAL at 09:28

## 2021-12-08 RX ADMIN — TIOTROPIUM BROMIDE INHALATION SPRAY 2 PUFF: 3.12 SPRAY, METERED RESPIRATORY (INHALATION) at 06:45

## 2021-12-08 ASSESSMENT — ACTIVITIES OF DAILY LIVING (ADL)
ADLS_ACUITY_SCORE: 19

## 2021-12-08 NOTE — PROGRESS NOTES
12/07/21 1500   Signing Clinician's Name / Credentials   Signing clinician's name / credentials Abdullahi Rolle MPT   Gait Training   Symptoms Noted During/After Treatment (Gait Training) fatigue   Distance in Feet (Required for LE Total Joints) 20   Treatment Detail ambulation within patient's room   [place] Level (Gait Training) contact guard   Physical Assistance Level (Gait Training) 1 person assist   Weight Bearing (Gait Training) full weight-bearing   Assistive Device (Gait Training) rolling walker   Therapeutic Activity   Treatment Detail bed mobilities with minimal assist to SBA   PT Discharge Planning    PT Discharge Recommendation (DC Rec) home with assist;home with home care physical therapy   PT Rationale for DC Rec to promote strength, stability and safe mobility   PT Brief overview of current status  improved gait stability today; fatigue remains along with decreased motivation to participate with PT due to this fatigue   Additional Documentation   PT Plan continue PT   Total Session Time   Total Session Time (minutes) 30 minutes

## 2021-12-08 NOTE — DISCHARGE SUMMARY
[facility]  Hospitalist Discharge Summary      Date of Admission:  12/4/2021  Date of Discharge:  12/8/2021  Discharging Provider: Alin Garcia MD      Discharge Diagnoses   Principal Problem:    Pneumonia of left lung due to infectious organism, unspecified part of lung  Active Problems:    COPD (chronic obstructive pulmonary disease) (H)    Hypertension    Hypomagnesemia    Acute kidney injury (H)    SUSAN (generalized anxiety disorder)    Tobacco abuse        Follow-ups Needed After Discharge   Follow-up Appointments     Follow-up and recommended labs and tests       Follow up with primary care provider, Osiel Calderon, within 7 days for   hospital follow- up and Check blood pressure after addition of lisinopril.    No follow up labs or test are needed.             Unresulted Labs Ordered in the Past 30 Days of this Admission     Date and Time Order Name Status Description    12/4/2021 10:39 AM Blood Culture Peripheral Blood In process     12/4/2021 10:39 AM Blood Culture Peripheral Blood In process       These results will be followed up by Dr. Calderon    Discharge Disposition   Admited to home care:   Agency: [facility]  Discharged to home  Condition at discharge: Satisfactory      Hospital Course   Pneumonia of left lung due to infectious organism, unspecified part of lung  Being treated for HCAP with recent hospitalization and NH stay  During hospitalization was treated with cefepime, IV  Improved slowly with normalization of respiratory status, not requiring oxygen  White count normalized, cultures were negative  At discharge was able to ambulate safely will be discharged home to finish out a course of oral Ceftin  Because of weakness, last at home health care work with her at home.  During this hospitalization she was evaluated by PT/OT and determined safe to return home with some supervision.    COPD (chronic obstructive pulmonary disease) (H)  No clinical signs of acute  exacerbation  Continue home meds, Nebs, steroid inhalers    Hypomagnesemia  Noted below one was replaced during this hospitalization    Acute kidney injury (H)  Resolved with fluids    SUSAN (generalized anxiety disorder)  Chronic and stable  Using prn xanax and ongoing effexor    Tobacco abuse  Ongoing use, on nicoderm patch replacement    Hypertension  Treated at home with metoprolol, blood pressures have been elevated  Per pharmacy, previously on lisinopril  Patient started on low-dose lisinopril in addition to metoprolol with good control  At discharge will continue low-dose lisinopril.        Consultations This Hospital Stay   RESPIRATORY CARE IP CONSULT  NUTRITION SERVICES ADULT IP CONSULT  SOCIAL WORK IP CONSULT  PHYSICAL THERAPY ADULT IP CONSULT  OCCUPATIONAL THERAPY ADULT IP CONSULT  PHYSICAL THERAPY ADULT IP CONSULT  OCCUPATIONAL THERAPY ADULT IP CONSULT    Code Status   Full Code    Time Spent on this Encounter   I, Alin Garcia MD, personally saw the patient today and spent greater than 30 minutes discharging this patient.       Alin Garcia MD  [facility] \[facility]\""  [address]  [place] [address]  Phone: [phone]  Fax: [phone]  ______________________________________________________________________    Physical Exam   Vital Signs: Temp: 99  F (37.2  C) Temp src: Tympanic BP: 124/79 Pulse: 109   Resp: 18 SpO2: 96 % O2 Device: None (Room air) Oxygen Delivery: 1.5 LPM  Weight: 117 lbs 6.4 oz  Constitutional: awake, alert, cooperative, no apparent distress, and appears stated age  Respiratory: lungs are clear, no wheeze  Cardiovascular: regular rate and rhythm  GI: normal bowel sounds, soft and non-distended  Musculoskeletal: no lower extremity pitting edema present  there is no redness, warmth, or swelling of the joints       Primary Care Physician   Osiel Calderon    Discharge Orders      Home care nursing referral      Home Care PT Referral for Hospital  Discharge      Home Care OT Referral for Hospital Discharge      Reason for your hospital stay    Admitted with shortness of breath due to a pneumonia     Follow-up and recommended labs and tests     Follow up with primary care provider, Osiel Calderon, within 7 days for hospital follow- up and Check blood pressure after addition of lisinopril.  No follow up labs or test are needed.     Activity    Your activity upon discharge: activity as tolerated     MD face to face encounter    Documentation of Face to Face and Certification for Home Health Services    I certify that patient: Jaida Nieto is under my care and that I, or a nurse practitioner or physician's assistant working with me, had a face-to-face encounter that meets the physician face-to-face encounter requirements with this patient on: 12/8/2021.    This encounter with the patient was in whole, or in part, for the following medical condition, which is the primary reason for home health care: pneumonia with subsequent weakness.    I certify that, based on my findings, the following services are medically necessary home health services: Nursing, Occupational Therapy, and Physical Therapy.    My clinical findings support the need for the above services because: Nurse is needed: To assess blood pressures and breathing status after changes in medications or other medical regimen.., Occupational Therapy Services are needed to assess and treat cognitive ability and address ADL safety due to impairment following pneumonia with subsequent weakness., and Physical Therapy Services are needed to assess and treat the following functional impairments: due to impairment following pneumonia with subsequent weakness.    Further, I certify that my clinical findings support that this patient is homebound (i.e. absences from home require considerable and taxing effort and are for medical reasons or [facility] services or infrequently or of short duration when for other reasons)  because: Leaving home is medically contraindicated for the following reason(s): Dyspnea on exertion that makes it so they cannot leave their home for needed services without clinical deterioration...    Based on the above findings. I certify that this patient is confined to the home and needs intermittent skilled nursing care, physical therapy and/or speech therapy.  The patient is under my care, and I have initiated the establishment of the plan of care.  This patient will be followed by a physician who will periodically review the plan of care.  Physician/Provider to provide follow up care: Osiel Calderon    Attending hospital physician (the Medicare certified [place] provider): Alin Garcia MD  Physician Signature: See electronic signature associated with these discharge orders.  Date: 12/8/2021     Diet    Follow this diet upon discharge: Orders Placed This Encounter      Snacks/Supplements Adult: Ensure Enlive; With Meals      Combination Diet Regular Diet Adult       Significant Results and Procedures   Most Recent 3 CBC's:Recent Labs   Lab Test 12/08/21 0457 12/07/21  0534 12/06/21  0714   WBC 12.0* 14.2* 15.7*   HGB 10.6* 9.8* 8.9*   MCV 91 93 93   * 419 386     Most Recent 3 BMP's:Recent Labs   Lab Test 12/08/21 0457 12/07/21  0534 12/06/21  0714    137 138   POTASSIUM 5.0 4.5 4.1   CHLORIDE 104 108* 112*   CO2 26 24 20*   BUN 19 23 33*   CR 0.72 0.80 1.09   ANIONGAP 7 5 6   DARRIUS 9.0 8.7 8.5*   GLC 96 97 92     Most Recent ABG:Recent Labs   Lab Test 12/04/21 2003   PH 7.31*   PO2 71*   PCO2 44   HCO3 22   ARIEL -4.2   ,   Results for orders placed or performed during the hospital encounter of 12/04/21   XR Chest Port 1 View    Narrative    PROCEDURE INFORMATION:   Exam: XR Chest   Exam date and time: 12/4/2021 10:03 AM   Age: 76 years old   Clinical indication: Shortness of breath; Additional info: SOB     TECHNIQUE:   Imaging protocol: XR of the chest.   Views: 1 view.     COMPARISON:   CR  XR CHEST PORT 1 VIEW 10/26/2021 9:28 PM     FINDINGS:   Lungs: Infiltrate seen previously at the lung bases are still present but   improved but there is a new area of significant confluent infiltrate lateral to   the left hilum. In the proper clinical setting the findings are consistent with   pneumonia.   Pleural spaces: Unremarkable. No pleural effusion. No pneumothorax.   Heart/Mediastinum: Unremarkable. No cardiomegaly.   Bones/joints: Unremarkable.       Impression    IMPRESSION:   Infiltrate seen previously at the lung bases are still present but improved but   there is a new area of significant confluent infiltrate lateral to the left   hilum. In the proper clinical setting the findings are consistent with   pneumonia.     THIS DOCUMENT HAS BEEN ELECTRONICALLY SIGNED BY JULES BAIRES MD       Discharge Medications   Current Discharge Medication List      START taking these medications    Details   cefuroxime (CEFTIN) 500 MG tablet Take 1 tablet (500 mg) by mouth 2 times daily for 7 days  Qty: 14 tablet, Refills: 0    Associated Diagnoses: Pneumonia due to 2019 novel coronavirus      lisinopril (ZESTRIL) 5 MG tablet Take 1 tablet (5 mg) by mouth daily  Qty: 30 tablet, Refills: 0    Associated Diagnoses: Primary hypertension         CONTINUE these medications which have NOT CHANGED    Details   albuterol (2.5 MG/3ML) 0.083% neb solution Inhale 1 vial into the lungs every 4 hours as needed       albuterol (VENTOLIN HFA) 108 (90 BASE) MCG/ACT Inhaler Inhale 1-2 puffs into the lungs every 4 hours as needed       ALPRAZolam (XANAX) 0.5 MG tablet Take 1 tablet (0.5 mg) by mouth 3 times daily as needed for anxiety  Qty: 30 tablet, Refills: 0    Associated Diagnoses: Anxiety      aspirin EC 81 MG EC tablet Take 81 mg by mouth daily       atorvastatin (LIPITOR) 20 MG tablet Take 20 mg by mouth At Bedtime       fluticasone-vilanterol (BREO ELLIPTA) 200-25 MCG/INH inhaler Inhale 1 puff into the lungs daily  Qty: 28  each, Refills: 3    Associated Diagnoses: Chronic obstructive pulmonary disease with acute exacerbation (H)      metoprolol succinate (TOPROL-XL) 25 MG 24 hr tablet Take 25 mg by mouth daily      mineral oil-hydrophilic petrolatum (AQUAPHOR) external ointment Apply topically as needed (dry skin or rash)      Nutritional Supplements (BOOST HIGH PROTEIN) LIQD Drink one pouch 3 times daily.      omeprazole (PRILOSEC) 40 MG DR capsule Take 40 mg by mouth daily      !! order for DME Equipment being ordered: Walker 4 wheels  Treatment Diagnosis: sacral fracture  PETE: 6 months  Qty: 1 Units, Refills: 0    Associated Diagnoses: Closed nondisplaced zone III fracture of sacrum, initial encounter (H)      !! order for DME Hospital bed for recent abdominal surgery      !! Respiratory Therapy Supplies (NEBULIZER COMPRESSOR) KIT Nebulizer machine and mask/tubing      !! Respiratory Therapy Supplies (NEBULIZER COMPRESSOR) KIT Nebulizer, neb kit, neb cup and mask.  Medication: Albuterol  For home use. Length of need  for Medicare patients: 99      tiotropium (SPIRIVA) 18 MCG inhaled capsule Inhale 1 capsule into the lungs daily       traMADol (ULTRAM) 50 MG tablet Take 1 tablet (50 mg) by mouth every 6 hours as needed for moderate pain  Qty: 30 tablet, Refills: 0    Associated Diagnoses: Pneumonia due to 2019 novel coronavirus      triamcinolone (KENALOG) 0.1 % external cream Apply topically 2 times daily as needed for irritation       !! venlafaxine (EFFEXOR-XR) 150 MG 24 hr capsule Take 150 mg by mouth daily Take with 75 mg capsule to equal 225 mg/day.      !! venlafaxine (EFFEXOR-XR) 75 MG 24 hr capsule Take 75 mg by mouth daily Take with 150 mg capsule to equal 225 mg/day.       !! - Potential duplicate medications found. Please discuss with provider.      STOP taking these medications       nicotine (NICODERM CQ) 14 MG/24HR 24 hr patch Comments:   Reason for Stopping:             Allergies   Allergies   Allergen Reactions      Gabapentin Other (See Comments) and Unknown     Felt like being plugged into light socket after taking 1 pill  Lethargy and sweaty     Latex Rash     Morphine Nausea and Vomiting and GI Disturbance     Codeine Other (See Comments) and Nausea and Vomiting     dizzy     Diphenhydramine      Other reaction(s): Irritability  Hyper, feels awful on this     Nortriptyline      Other reaction(s): Emotional Disturbance  Made her feel funny     Sucralfate Nausea

## 2021-12-08 NOTE — PROGRESS NOTES
SAFETY CHECKLIST  ID Bands and Risk clasps correct and in place (DNR, Fall risk, Allergy, Latex, Limb):  Yes  All Lines Reconciled and labeled correctly: Yes  Whiteboard updated:Yes  Environmental interventions (bed/chair alarm on, call light, side rails, restraints, sitter....): Yes  Verify Tele #: NA  Lu Balbuena RN on 12/8/2021 at 7:35 AM

## 2021-12-08 NOTE — PHARMACY - DISCHARGE MEDICATION RECONCILIATION
[facility]  [facility] of [facility]  [address] [place] [id number]    December 8, 2021    Dear Pharmacist,    Your customer, Jaida Nieto, born on 1945, was recently discharged from [facility].  We have updated her medication list and want to alert you to the following:       Review of your medicines      START taking      Dose / Directions   cefuroxime 500 MG tablet  Commonly known as: CEFTIN  Used for: Pneumonia due to 2019 novel coronavirus      Dose: 500 mg  Take 1 tablet (500 mg) by mouth 2 times daily for 7 days  Quantity: 14 tablet  Refills: 0     lisinopril 5 MG tablet  Commonly known as: ZESTRIL  Used for: Primary hypertension      Dose: 5 mg  Start taking on: December 9, 2021  Take 1 tablet (5 mg) by mouth daily  Quantity: 30 tablet  Refills: 0        CONTINUE these medicines which have NOT CHANGED      Dose / Directions   * albuterol (2.5 MG/3ML) 0.083% neb solution  Commonly known as: PROVENTIL      Dose: 1 vial  Inhale 1 vial into the lungs every 4 hours as needed  Refills: 0     * Ventolin  (90 Base) MCG/ACT inhaler  Generic drug: albuterol      Dose: 1-2 puff  Inhale 1-2 puffs into the lungs every 4 hours as needed  Refills: 0     ALPRAZolam 0.5 MG tablet  Commonly known as: XANAX  Used for: Anxiety      Dose: 0.5 mg  Take 1 tablet (0.5 mg) by mouth 3 times daily as needed for anxiety  Quantity: 30 tablet  Refills: 0     aspirin 81 MG EC tablet      Dose: 81 mg  Take 81 mg by mouth daily  Refills: 0     atorvastatin 20 MG tablet  Commonly known as: LIPITOR      Dose: 20 mg  Take 20 mg by mouth At Bedtime  Refills: 0     Boost High Protein Liqd      Drink one pouch 3 times daily.  Refills: 0     fluticasone-vilanterol 200-25 MCG/INH inhaler  Commonly known as: BREO ELLIPTA      Dose: 1 puff  Inhale 1 puff into the lungs daily  Quantity: 28 each  Refills: 3     metoprolol succinate ER 25 MG 24 hr tablet  Commonly known as:  TOPROL-XL      Dose: 25 mg  Take 25 mg by mouth daily  Refills: 0     mineral oil-hydrophilic petrolatum external ointment      Apply topically as needed (dry skin or rash)  Refills: 0     * Nebulizer Compressor Kit      Nebulizer, neb kit, neb cup and mask.  Medication: Albuterol  For home use. Length of need  for Medicare patients: 99  Refills: 0     * Nebulizer Compressor Kit      Nebulizer machine and mask/tubing  Refills: 0     omeprazole 40 MG DR capsule  Commonly known as: priLOSEC      Dose: 40 mg  Take 40 mg by mouth daily  Refills: 0     order for DME      Hospital bed for recent abdominal surgery  Refills: 0     order for DME  Used for: Closed nondisplaced zone III fracture of sacrum, initial encounter (H)      Equipment being ordered: Walker 4 wheels  Treatment Diagnosis: sacral fracture  PETE: 6 months  Quantity: 1 Units  Refills: 0     tiotropium 18 MCG inhaled capsule  Commonly known as: SPIRIVA      Dose: 1 capsule  Inhale 1 capsule into the lungs daily  Refills: 0     traMADol 50 MG tablet  Commonly known as: ULTRAM  Used for: Pneumonia due to 2019 novel coronavirus      Dose: 50 mg  Take 1 tablet (50 mg) by mouth every 6 hours as needed for moderate pain  Quantity: 30 tablet  Refills: 0     triamcinolone 0.1 % external cream  Commonly known as: KENALOG      Apply topically 2 times daily as needed for irritation  Refills: 0     * venlafaxine 150 MG 24 hr capsule  Commonly known as: EFFEXOR-XR      Dose: 150 mg  Take 150 mg by mouth daily Take with 75 mg capsule to equal 225 mg/day.  Refills: 0     * venlafaxine 75 MG 24 hr capsule  Commonly known as: EFFEXOR-XR      Dose: 75 mg  Take 75 mg by mouth daily Take with 150 mg capsule to equal 225 mg/day.  Refills: 0         * This list has 6 medication(s) that are the same as other medications prescribed for you. Read the directions carefully, and ask your doctor or other care provider to review them with you.               Where to get your medicines       These medications were sent to [id number] - [place], [place] - [address]  [address], [place] [address]    Phone: [phone]     cefuroxime 500 MG tablet    lisinopril 5 MG tablet         We also reviewed Jaida Nieto's allergy list and updated it as needed:  Allergies: Gabapentin, Latex, Morphine, Codeine, Diphenhydramine, Nortriptyline, and Sucralfate    Thank you for continuing to care for Jaida Nieto.  We look forward to working together with you in the future.    Sincerely,  Alyssa Henry, [facility]

## 2021-12-08 NOTE — PROGRESS NOTES
[facility] DISCHARGE NOTE    Patient discharged to home at 1:11 PM via wheel chair. Accompanied by spouse and staff. Discharge instructions reviewed with patient, opportunity offered to ask questions. Prescriptions sent to patients preferred pharmacy. All belongings sent with patient.    Lu Balbuena RN

## 2021-12-08 NOTE — PROGRESS NOTES
:     Met with patient to discuss discharge planning.  Patient will be discharging home with [facility] for PT/OT/RN.  She was agreeable to a referral to [facility] [place] a [place] assessment.  Written referral was placed to Nimo Pearson.  Patient needed assistance with a ride home.  She has coverage through her medical insurance and a ride was scheduled for 1300 with [facility].  She was made aware that she has coverage for medical rides and to keep this in mind for future medical appts.     Update given to Sara at [facility].  Faxed discharge orders.     No further needs.    YASMANI Guallpa on 12/8/2021 at 10:44 AM

## 2021-12-08 NOTE — PROGRESS NOTES
PT calm and cooperative during shift, IV abx given, VSS, removed oxygen via NC, saturations above 95% on RA. Denied pain. Several episodes of tearfulness, able to console, pt eager to get home. SBA in room. All alarms active and audible    Jacinda Anne RN 12/7/2021 6:25 PM

## 2021-12-08 NOTE — PHARMACY - DISCHARGE MEDICATION RECONCILIATION AND EDUCATION
Pharmacy: Discharge Counseling and Medication Reconciliation    Jaida Nieto  PO   [facility] [place] [address]  [phone] (home) [phone] (work)  76 year old female  PCP:Osiel Calderon    Allergies   Allergen Reactions     Gabapentin Other (See Comments) and Unknown     Felt like being plugged into light socket after taking 1 pill  Lethargy and sweaty     Latex Rash     Morphine Nausea and Vomiting and GI Disturbance     Codeine Other (See Comments) and Nausea and Vomiting     dizzy     Diphenhydramine      Other reaction(s): Irritability  Hyper, feels awful on this     Nortriptyline      Other reaction(s): Emotional Disturbance  Made her feel funny     Sucralfate Nausea       Discharge Counseling:    Pharmacist met with patient (and/or family) today to review the medication portion of the After Visit Summary (with an emphasis on NEW medications) and to address patient's questions/concerns.     Summary of Education:   Met with patient at time of discharge to review all changes in medications including new cefuroxime and lisinopril. Discussed indications, directions for use, and possible side effects. Patient asked a few questions and all concerns were addressed.     Materials Provided:   MedCounselor sheets printed from Clinical Pharmacology on: cefuroxime and lisinopril    Discharge Medication Reconciliation:    Alyssa Henry RPH has reviewed the patient's discharge medication orders and has compared them to the inpatient medication administration record and to what the patient was taking prior to admission- any discrepancies have been resolved.     It has been determined that the patient has an adequate supply of medications available or which can be obtained from the patient's preferred pharmacy, which has been confirmed as: [facility], [place], [place]. [An updated medication list will be faxed to the patient's pharmacy.]     Thank you for the consult.     Alyssa Henry RPH  ....................  12/8/2021   12:35 PM

## 2021-12-08 NOTE — PROGRESS NOTES
Patient c/o not being able to sleep was given prn medications to help. Was able to transfer to the bathroom with no issues. Donya Gomez RN on 12/8/2021 at 5:54 AM

## 2021-12-08 NOTE — PLAN OF CARE
Physical Therapy Discharge Summary    Reason for therapy discharge:    Discharged to home with home therapy.    Progress towards therapy goal(s). See goals on Care Plan in [facility] electronic health record for goal details.  Goals partially met.  Barriers to achieving goals:   discharge from facility.    Therapy recommendation(s):    Continued therapy is recommended.  Rationale/Recommendations:  to promote strength, stability and safe mobility.

## 2021-12-08 NOTE — PLAN OF CARE
Patient continues to improve, no increase in oxygen needs. Rested well through the night. Donya Gomez RN on 12/8/2021 at 5:10 AM

## 2021-12-08 NOTE — PROGRESS NOTES
SAFETY CHECKLIST  ID Bands and Risk clasps correct and in place (DNR, Fall risk, Allergy, Latex, Limb):  Yes  All Lines Reconciled and labeled correctly: Yes  Whiteboard updated:Yes  Environmental interventions (bed/chair alarm on, call light, side rails, restraints, sitter....): Yes  Verify Tele #:[id number]  Donya Gomez RN on 12/7/2021 at 10:36 PM

## 2021-12-08 NOTE — PROGRESS NOTES
12/08/21 1300   Signing Clinician's Name / Credentials   Signing clinician's name / credentials Abdullahi Rolle MPT   Quick Adds   Rehab Discipline PT   Functional Transfer Training   Treatment Detail SBA with fww   Gait Training   Symptoms Noted During/After Treatment (Gait Training) fatigue   Distance in Feet (Required for LE Total Joints) 25   Treatment Detail ambulation within patient's room   [place] Level (Gait Training) stand-by assist   Physical Assistance Level (Gait Training) supervision   Weight Bearing (Gait Training) full weight-bearing   Assistive Device (Gait Training) rolling walker   Therapeutic Activity   Treatment Detail bed mobilities with SBA   PT Discharge Planning    PT Discharge Recommendation (DC Rec) home with assist;home with home care physical therapy   PT Rationale for DC Rec to promote strength, stability and safe mobility   PT Brief overview of current status  patient continues with improved mobility; she will benefit from continued PT    Additional Documentation   PT Plan patient discharging home with home care PT   Total Session Time   Total Session Time (minutes) 25 minutes

## 2021-12-09 ENCOUNTER — PATIENT OUTREACH (OUTPATIENT)
Dept: CARE COORDINATION | Facility: CLINIC | Age: 76
End: 2021-12-09
Payer: COMMERCIAL

## 2021-12-09 LAB
BACTERIA BLD CULT: NO GROWTH
BACTERIA BLD CULT: NO GROWTH

## 2021-12-09 NOTE — PLAN OF CARE
Occupational Therapy Discharge Summary    Reason for therapy discharge:    Discharged to home with home therapy.    Progress towards therapy goal(s). See goals on Care Plan in [facility] electronic health record for goal details.  Goals partially met.  Barriers to achieving goals:   discharge from facility.    Therapy recommendation(s):    Continued therapy is recommended.  Rationale/Recommendations:  on-going home care therapies to maximize level of independence needed to return to baseline function. .

## 2021-12-09 NOTE — PROGRESS NOTES
Clinic Care Coordination Contact    Patient has PCP elsewhere, no follow-up here. No TCM call required per policy. Has appointment scheduled 12/13/2021.  Regine Cummings RN ,....................  12/9/2021   8:00 AM

## 2021-12-09 NOTE — PROGRESS NOTES
12/08/21 6097   Signing Clinician's Name / Credentials   Signing clinician's name / credentials Mishel Bonilla OTR/L    Quick Adds   Rehab Discipline OT   Functional Transfer Training   Treatment Detail SBA with FWW    Walk-In Shower Transfer Training   Walk-In Shower Transfer [place] Level (Training) stand-by assist   Walk-In Shower Transfer Physical Assistance Level (Training) supervision   Gait Training   Symptoms Noted During/After Treatment (Gait Training) fatigue   [place] Level (Gait Training) stand-by assist   Assistive Device (Gait Training) rolling walker   Bathing Training   [place] Level (Bathing Training) minimum assist (75% patient effort)   Assistance (Bathing Training) 1 person assist   Upper Body Dressing Training   [place] Level (Upper Body Dressing Training) stand-by assist   Assistance (Upper Body Dressing Training) supervision   Lower Body Dressing Training   Lower Body Dressing Training Assistance minimum assist (75% patient effort)   OT Discharge Planning    OT Discharge Recommendation (DC Rec) home with home care occupational therapy   OT Rationale for DC Rec Pt has progressed very well, continues to have decreased endurance and strength    OT Brief overview of current status  Pt ambulated in room and bathroom with CGA/SBA, pt needed min assist overall with showering, min assist with L/B dressing and max assist with grooming due to tangled hair.    Additional Documentation   OT Plan d/c home today with home care therapies    Total Session Time   Total Session Time (minutes) 60 minutes

## 2021-12-13 RX ORDER — LISINOPRIL 5 MG/1
TABLET ORAL
Qty: 90 TABLET | OUTPATIENT
Start: 2021-12-13

## 2022-01-01 ENCOUNTER — LAB REQUISITION (OUTPATIENT)
Dept: LAB | Facility: CLINIC | Age: 77
End: 2022-01-01
Payer: COMMERCIAL

## 2022-01-01 DIAGNOSIS — D64.9 ANEMIA, UNSPECIFIED: ICD-10-CM

## 2022-01-01 DIAGNOSIS — R53.1 WEAKNESS: ICD-10-CM

## 2022-01-01 DIAGNOSIS — I25.10 ATHEROSCLEROTIC HEART DISEASE OF NATIVE CORONARY ARTERY WITHOUT ANGINA PECTORIS: ICD-10-CM

## 2022-01-09 DIAGNOSIS — I10 PRIMARY HYPERTENSION: ICD-10-CM

## 2022-01-11 RX ORDER — LISINOPRIL 5 MG/1
TABLET ORAL
Qty: 90 TABLET | OUTPATIENT
Start: 2022-01-11

## 2022-01-11 NOTE — TELEPHONE ENCOUNTER
PCP external to [facility] facility. Notation made to requesting pharmacy. Unable to complete prescription refill per RN Medication Refill Policy.................... Joseline Cam RN ....................  1/11/2022   8:31 AM

## 2022-04-13 ENCOUNTER — APPOINTMENT (OUTPATIENT)
Dept: GENERAL RADIOLOGY | Facility: OTHER | Age: 77
End: 2022-04-13
Attending: PHYSICIAN ASSISTANT
Payer: COMMERCIAL

## 2022-04-13 ENCOUNTER — HOSPITAL ENCOUNTER (EMERGENCY)
Facility: OTHER | Age: 77
Discharge: HOME | End: 2022-04-13
Attending: PHYSICIAN ASSISTANT | Admitting: PHYSICIAN ASSISTANT
Payer: COMMERCIAL

## 2022-04-13 VITALS
HEART RATE: 91 BPM | HEIGHT: 67 IN | TEMPERATURE: 97.6 F | RESPIRATION RATE: 30 BRPM | BODY MASS INDEX: 16.95 KG/M2 | OXYGEN SATURATION: 97 % | DIASTOLIC BLOOD PRESSURE: 78 MMHG | SYSTOLIC BLOOD PRESSURE: 132 MMHG | WEIGHT: 108 LBS

## 2022-04-13 DIAGNOSIS — J20.9 ACUTE BRONCHITIS, UNSPECIFIED ORGANISM: ICD-10-CM

## 2022-04-13 LAB
FLUAV RNA SPEC QL NAA+PROBE: NEGATIVE
FLUBV RNA RESP QL NAA+PROBE: NEGATIVE
RSV RNA SPEC NAA+PROBE: NEGATIVE
SARS-COV-2 RNA RESP QL NAA+PROBE: NEGATIVE

## 2022-04-13 PROCEDURE — 87637 SARSCOV2&INF A&B&RSV AMP PRB: CPT | Performed by: PHYSICIAN ASSISTANT

## 2022-04-13 PROCEDURE — C9803 HOPD COVID-19 SPEC COLLECT: HCPCS | Performed by: PHYSICIAN ASSISTANT

## 2022-04-13 PROCEDURE — 71045 X-RAY EXAM CHEST 1 VIEW: CPT

## 2022-04-13 PROCEDURE — 99284 EMERGENCY DEPT VISIT MOD MDM: CPT | Mod: 25 | Performed by: PHYSICIAN ASSISTANT

## 2022-04-13 PROCEDURE — 99284 EMERGENCY DEPT VISIT MOD MDM: CPT | Performed by: PHYSICIAN ASSISTANT

## 2022-04-13 RX ORDER — PREDNISONE 20 MG/1
TABLET ORAL
Qty: 10 TABLET | Refills: 0 | Status: SHIPPED | OUTPATIENT
Start: 2022-04-13

## 2022-04-13 RX ORDER — DOXYCYCLINE 100 MG/1
CAPSULE ORAL
Qty: 2 CAPSULE | Refills: 0 | Status: SHIPPED | OUTPATIENT
Start: 2022-04-13

## 2022-04-13 RX ORDER — BENZONATATE 200 MG/1
200 CAPSULE ORAL 3 TIMES DAILY PRN
Qty: 30 CAPSULE | Refills: 0 | Status: SHIPPED | OUTPATIENT
Start: 2022-04-13 | End: 2022-01-01

## 2022-04-13 NOTE — ED PROVIDER NOTES
History     Chief Complaint   Patient presents with     Shortness of Breath     HPI  Jaida Nieto is a 76 year old female who presents to the emergency department for evaluation of a cough.  She had increasing cough over the last couple days she is not hypoxic she is afebrile she does not have any chest pain no abdominal pain diarrhea constipation no loss bowel bladder function rashes he does not have any trauma her symptoms are mild.    Allergies:  Allergies   Allergen Reactions     Gabapentin Other (See Comments) and Unknown     Felt like being plugged into light socket after taking 1 pill  Lethargy and sweaty     Latex Rash     Morphine Nausea and Vomiting and GI Disturbance     Codeine Other (See Comments) and Nausea and Vomiting     dizzy     Diphenhydramine      Other reaction(s): Irritability  Hyper, feels awful on this     Nortriptyline      Other reaction(s): Emotional Disturbance  Made her feel funny     Sucralfate Nausea       Problem List:    Patient Active Problem List    Diagnosis Date Noted     Renal insufficiency 12/04/2021     Priority: Medium     Pneumonia of left lung due to infectious organism, unspecified part of lung 12/04/2021     Priority: Medium     Acute kidney injury (H) 12/04/2021     Priority: Medium     Hyponatremia 10/27/2021     Priority: Medium     Acute respiratory failure with hypoxia (H) 10/27/2021     Priority: Medium     Pneumonia due to 2019 novel coronavirus 10/27/2021     Priority: Medium     Anemia 03/09/2018     Priority: Medium     Hypomagnesemia 03/09/2018     Priority: Medium     Community acquired pneumonia of right lung 03/08/2018     Priority: Medium     E. coli UTI (urinary tract infection) 03/08/2018     Priority: Medium     Closed fracture of sacrum (H) 03/08/2018     Priority: Medium     Fall at home 03/08/2018     Priority: Medium     Abdominal pain, chronic, right upper quadrant 11/30/2015     Priority: Medium     Controlled substance agreement signed  11/30/2015     Priority: Medium     Squamous cell carcinoma of skin of left upper extremity, including shoulder 11/12/2015     Priority: Medium     COPD (chronic obstructive pulmonary disease) (H) 08/05/2015     Priority: Medium     Recurrent ventral hernia 02/14/2014     Priority: Medium     SUSAN (generalized anxiety disorder) 01/08/2014     Priority: Medium     Vitamin D deficiency 10/14/2013     Priority: Medium     Hypertension 08/30/2013     Priority: Medium     H/O adenomatous polyp of colon 08/26/2013     Priority: Medium     Depression 06/10/2013     Priority: Medium     Diverticulosis of sigmoid colon 06/07/2013     Priority: Medium     CAD (coronary artery disease) 01/08/2007     Priority: Medium     mild CAD 9-06; Stress CM, initial EF 15%.       Cardiomyopathy in diseases classified elsewhere (H) 09/15/2006     Priority: Medium     Stress CM/[place]  initial EF15%. 9-06  F/U EF 55%.       Tobacco abuse 09/15/2006     Priority: Medium     Overview:   1PPD       Migraine headache 09/13/2006     Priority: Medium     Pure hypercholesterolemia 09/13/2006     Priority: Medium        Past Medical History:    Past Medical History:   Diagnosis Date     Abdominal pain, chronic, right upper quadrant 11/30/2015     Acute biliary pancreatitis 10/5/2015     CAD (coronary artery disease) 01/2007     Cardiomyopathy in disease classified elsewhere (H) 09/2006     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     Other specified congenital malformations of kidney (CODE) 06/28/2011     S/P repair of recurrent ventral hernia 3/11/2014     Squamous cell  carcinoma of skin of left upper limb, including shoulder 11/2015     Type 2 diabetes mellitus without complications (H)      Vitamin D deficiency 10/14/2013       Past Surgical History:    Past Surgical History:   Procedure Laterality Date     ARTHROPLASTY HIP      left     ARTHROSCOPY KNEE      left knee surgical repair     CHOLECYSTECTOMY  09/03/2013    open     COLONOSCOPY  08/26/2013 8/26/13,F/U 2018     COLOSTOMY  06/04/2013    Sigmoid Colostomy/Brian's, removal mesh     COLOSTOMY  08/27/2013    Colostomy closure, appy 29 mm EEA     ELBOW SURGERY      left elbow repair surgical     HERNIA REPAIR      x 2 with mesh     LAPAROTOMY EXPLORATORY  09/03/2013    9/3/13,lysis of adhesions     OTHER SURGICAL HISTORY  03/10/2014    with mesh and bilateral component separation       Family History:    Family History   Problem Relation Age of Onset     Heart Disease Mother         Heart Disease,Valvular Heart Surgery     Colon Cancer Father         Cancer-colon     Colon Cancer Sister         Cancer-colon       Social History:  Marital Status:   [5]  Social History     Tobacco Use     Smoking status: Current Every Day Smoker     Packs/day: 1.00     Years: 42.00     Pack years: 42.00     Types: Cigarettes     Smokeless tobacco: Never Used   Vaping Use     Vaping Use: Never used   Substance Use Topics     Alcohol use: No     Alcohol/week: 0.0 standard drinks     Drug use: Never        Medications:    benzonatate (TESSALON) 200 MG capsule  doxycycline monohydrate (MONODOX) 100 MG capsule  predniSONE (DELTASONE) 20 MG tablet  albuterol (2.5 MG/3ML) 0.083% neb solution  albuterol (VENTOLIN HFA) 108 (90 BASE) MCG/ACT Inhaler  ALPRAZolam (XANAX) 0.5 MG tablet  aspirin EC 81 MG EC tablet  atorvastatin (LIPITOR) 20 MG tablet  fluticasone-vilanterol (BREO ELLIPTA) 200-25 MCG/INH inhaler  lisinopril (ZESTRIL) 5 MG tablet  metoprolol succinate (TOPROL-XL) 25 MG 24 hr tablet  mineral oil-hydrophilic petrolatum (AQUAPHOR)  "external ointment  Nutritional Supplements (BOOST HIGH PROTEIN) LIQD  omeprazole (PRILOSEC) 40 MG DR capsule  order for DME  order for DME  Respiratory Therapy Supplies (NEBULIZER COMPRESSOR) KIT  Respiratory Therapy Supplies (NEBULIZER COMPRESSOR) KIT  tiotropium (SPIRIVA) 18 MCG inhaled capsule  traMADol (ULTRAM) 50 MG tablet  triamcinolone (KENALOG) 0.1 % external cream  venlafaxine (EFFEXOR-XR) 150 MG 24 hr capsule  venlafaxine (EFFEXOR-XR) 75 MG 24 hr capsule          Review of Systems   Please see HPI for pertinent positives and negatives.  All other systems reviewed and found to be negative.      Physical Exam   BP: 118/75  Pulse: 98  Temp: 97.6  F (36.4  C)  Resp: 22  Height: 170.2 cm (5' 7\")  Weight: 49 kg (108 lb)  SpO2: 98 %      Physical Exam   Exam:  Constitutional: healthy, alert and no distress  Head: Normocephalic.    Neck: Neck supple.    ENT: ENT exam normal, no neck nodes or sinus tenderness  Cardiovascular:  RRR. No murmurs, clicks gallops or rub  Respiratory:  Lungs crackles noted  Gastrointestinal: Abdomen soft, non-tender. BS normal. No masses, organomegaly  : Deferred  Musculoskeletal: extremities normal- no gross deformities noted, gait normal and normal muscle tone  Skin: no suspicious lesions or rashes  Neurologic: Gait normal. Reflexes normal and symmetric. Sensation grossly WNL.  Psychiatric: mentation appears normal and affect normal/bright         ED Course                 Procedures                   Results for orders placed or performed during the hospital encounter of 04/13/22 (from the past 24 hour(s))   XR Chest Port 1 View    Narrative    PROCEDURE:  XR CHEST PORT 1 VIEW    HISTORY:  cough.     COMPARISON:  December 4, 2021    FINDINGS:   The cardiac silhouette is normal in size. There is an irregular  distribution hypervascularity consistent with emphysema There is a  significant improvement in the left lung opacities as compared to  December 2021. Some residual linear " scarring is seen laterally in the  left chest. No pleural effusion or pneumothorax.      Impression    IMPRESSION:  Significant improvement in left lung opacities as  compared to December 4, 2020      RIP BARAKAT MD         SYSTEM ID:  [id number]       Medications - No data to display    Assessments & Plan (with Medical Decision Making)     I have reviewed the nursing notes.    I have reviewed the findings, diagnosis, plan and need for follow up with the patient.  Differential diagnosis at this point include: asthma, COPD exacerbation, bronchitis, pulmonary edema, acute coronary syndromes, pulmonary embolism, pneumonia, pneumothorax,  Pleasant female who presents to the emergency department for evaluation she is a smoker she is had productive cough she says chest x-ray as noted above.  I will place her on doxycycline and some Tessalon if symptoms worsen if there is further concerns I would encourage her return to the emergency department she is in agreement at this time  I explained my diagnostic considerations and recommendations and the patient voiced an understanding and was in agreement with the treatment plan. All questions were answered. We discussed potential side effects of any prescribed or recommended therapies, as well as expectations for response to treatments.       New Prescriptions    BENZONATATE (TESSALON) 200 MG CAPSULE    Take 1 capsule (200 mg) by mouth 3 times daily as needed for cough    DOXYCYCLINE MONOHYDRATE (MONODOX) 100 MG CAPSULE    Take 2 capsules (200 mg) by mouth once for 1 dose    PREDNISONE (DELTASONE) 20 MG TABLET    Take two tablets (= 40mg) each day for 5 (five) days       Final diagnoses:   Acute bronchitis, unspecified organism       4/13/2022   [facility]     Rom Samayoa PA-C  04/13/22 0409

## 2022-04-13 NOTE — ED TRIAGE NOTES
"ED Nursing Triage Note (General)   ________________________________    Jaida Nieto is a 76 year old Female that presents to triage private car  With history of  Feels like she is heading towards pneumonia, increased weakness, short of breath, coughing and states feels like it burns reported by patient   Significant symptoms had onset 3-4 day(s) ago.  /75   Pulse 98   Temp 97.6  F (36.4  C) (Temporal)   Resp 22   Ht 1.702 m (5' 7\")   Wt 49 kg (108 lb)   SpO2 98%   BMI 16.92 kg/m  t  Patient appears alert  and oriented, in no acute distress, but was mildly anxious., and cooperative and calm behavior.  GCS Total = 15  Airway: intact  Breathing noted as Normal.  Circulation Normal  Skin normal, warm  Action taken: will room as soon as room available.       PRE HOSPITAL PRIOR LIVING SITUATION Significant other.  "

## 2022-12-02 NOTE — ED PROVIDER NOTES
Transfer of care from previous shift provider. Sign out details: Covid+ 1 week ago coming in with spo2 high 80s. Uses O2 at home but apparently discontinued using. Bibasilar infiltrates with electrolyte abnormalities, likely covid pna. PE study pending. Given CTX and azithromycin for possible bacterial PNA, decadron, magnsium. See separate Emergency Department note below.    Final diagnoses:   Acute respiratory failure with hypoxia (H)   Pneumonia due to 2019 novel coronavirus   Hypomagnesemia   Hyponatremia       Assessment and Plan:  Wean down to 1 L of oxygen and had patient evaluated by RT for potential home oxygen.  RT feels that she needs admission as she will likely require increased oxygen with any exertion/ambulation.  She does appear very ill and likely would benefit from admission.  Case discussed with Dr. Perez who accept admission for further management.    Lance Granda MD   10/26/2021  [facility]        Lance Granda MD  10/27/21 0713     There are no Wet Read(s) to document.

## (undated) RX ORDER — METHYLPREDNISOLONE SODIUM SUCCINATE 125 MG/2ML
INJECTION, POWDER, LYOPHILIZED, FOR SOLUTION INTRAMUSCULAR; INTRAVENOUS
Status: DISPENSED
Start: 2019-12-27

## (undated) RX ORDER — CEFTRIAXONE SODIUM 2 G/50ML
INJECTION, SOLUTION INTRAVENOUS
Status: DISPENSED
Start: 2021-10-26

## (undated) RX ORDER — SODIUM CHLORIDE 9 MG/ML
INJECTION, SOLUTION INTRAVENOUS
Status: DISPENSED
Start: 2021-10-27

## (undated) RX ORDER — ACETAMINOPHEN 325 MG/1
TABLET ORAL
Status: DISPENSED
Start: 2021-10-27

## (undated) RX ORDER — CEFTRIAXONE SODIUM 1 G/50ML
INJECTION, SOLUTION INTRAVENOUS
Status: DISPENSED
Start: 2021-12-04

## (undated) RX ORDER — ALBUTEROL SULFATE 90 UG/1
AEROSOL, METERED RESPIRATORY (INHALATION)
Status: DISPENSED
Start: 2021-10-26

## (undated) RX ORDER — SODIUM CHLORIDE 9 MG/ML
INJECTION, SOLUTION INTRAVENOUS
Status: DISPENSED
Start: 2021-12-04

## (undated) RX ORDER — FENTANYL CITRATE 50 UG/ML
INJECTION, SOLUTION INTRAMUSCULAR; INTRAVENOUS
Status: DISPENSED
Start: 2019-12-27

## (undated) RX ORDER — FENTANYL CITRATE 50 UG/ML
INJECTION, SOLUTION INTRAMUSCULAR; INTRAVENOUS
Status: DISPENSED
Start: 2018-03-08

## (undated) RX ORDER — CEFTRIAXONE SODIUM 1 G/50ML
INJECTION, SOLUTION INTRAVENOUS
Status: DISPENSED
Start: 2018-03-08

## (undated) RX ORDER — ALBUTEROL SULFATE 0.83 MG/ML
SOLUTION RESPIRATORY (INHALATION)
Status: DISPENSED
Start: 2018-03-08

## (undated) RX ORDER — DEXAMETHASONE SODIUM PHOSPHATE 10 MG/ML
INJECTION, SOLUTION INTRAMUSCULAR; INTRAVENOUS
Status: DISPENSED
Start: 2021-10-26

## (undated) RX ORDER — LORAZEPAM 1 MG/1
TABLET ORAL
Status: DISPENSED
Start: 2021-12-04

## (undated) RX ORDER — MAGNESIUM SULFATE HEPTAHYDRATE 40 MG/ML
INJECTION, SOLUTION INTRAVENOUS
Status: DISPENSED
Start: 2021-10-26

## (undated) RX ORDER — AZITHROMYCIN 500 MG/5ML
INJECTION, POWDER, LYOPHILIZED, FOR SOLUTION INTRAVENOUS
Status: DISPENSED
Start: 2021-10-26

## (undated) RX ORDER — AZITHROMYCIN 500 MG/5ML
INJECTION, POWDER, LYOPHILIZED, FOR SOLUTION INTRAVENOUS
Status: DISPENSED
Start: 2021-12-04